# Patient Record
Sex: MALE | Race: WHITE | NOT HISPANIC OR LATINO | URBAN - METROPOLITAN AREA
[De-identification: names, ages, dates, MRNs, and addresses within clinical notes are randomized per-mention and may not be internally consistent; named-entity substitution may affect disease eponyms.]

---

## 2018-02-16 ENCOUNTER — INPATIENT (INPATIENT)
Facility: HOSPITAL | Age: 28
LOS: 4 days | Discharge: HOME | DRG: 378 | End: 2018-02-21
Attending: SURGERY | Admitting: SURGERY
Payer: COMMERCIAL

## 2018-02-16 VITALS
RESPIRATION RATE: 17 BRPM | TEMPERATURE: 99 F | HEART RATE: 97 BPM | SYSTOLIC BLOOD PRESSURE: 161 MMHG | DIASTOLIC BLOOD PRESSURE: 89 MMHG | OXYGEN SATURATION: 97 %

## 2018-02-16 DIAGNOSIS — K92.2 GASTROINTESTINAL HEMORRHAGE, UNSPECIFIED: ICD-10-CM

## 2018-02-16 DIAGNOSIS — Z98.890 OTHER SPECIFIED POSTPROCEDURAL STATES: Chronic | ICD-10-CM

## 2018-02-16 LAB
LACTATE SERPL-SCNC: 1.5 MMOL/L — SIGNIFICANT CHANGE UP (ref 0.5–2)
LACTATE SERPL-SCNC: 2.8 MMOL/L — HIGH (ref 0.5–2)
OB PNL STL: POSITIVE

## 2018-02-16 PROCEDURE — 99285 EMERGENCY DEPT VISIT HI MDM: CPT | Mod: 25

## 2018-02-16 PROCEDURE — 93010 ELECTROCARDIOGRAM REPORT: CPT | Mod: NC

## 2018-02-16 RX ORDER — ONDANSETRON 8 MG/1
4 TABLET, FILM COATED ORAL EVERY 6 HOURS
Qty: 0 | Refills: 0 | Status: DISCONTINUED | OUTPATIENT
Start: 2018-02-16 | End: 2018-02-21

## 2018-02-16 RX ORDER — ONDANSETRON 8 MG/1
4 TABLET, FILM COATED ORAL ONCE
Qty: 0 | Refills: 0 | Status: COMPLETED | OUTPATIENT
Start: 2018-02-16 | End: 2018-02-16

## 2018-02-16 RX ORDER — SODIUM CHLORIDE 9 MG/ML
1000 INJECTION, SOLUTION INTRAVENOUS
Qty: 0 | Refills: 0 | Status: DISCONTINUED | OUTPATIENT
Start: 2018-02-16 | End: 2018-02-18

## 2018-02-16 RX ORDER — SOD SULF/SODIUM/NAHCO3/KCL/PEG
4000 SOLUTION, RECONSTITUTED, ORAL ORAL ONCE
Qty: 0 | Refills: 0 | Status: COMPLETED | OUTPATIENT
Start: 2018-02-16 | End: 2018-02-16

## 2018-02-16 RX ORDER — PANTOPRAZOLE SODIUM 20 MG/1
80 TABLET, DELAYED RELEASE ORAL ONCE
Qty: 0 | Refills: 0 | Status: COMPLETED | OUTPATIENT
Start: 2018-02-16 | End: 2018-02-16

## 2018-02-16 RX ORDER — SODIUM CHLORIDE 9 MG/ML
2000 INJECTION INTRAMUSCULAR; INTRAVENOUS; SUBCUTANEOUS ONCE
Qty: 0 | Refills: 0 | Status: COMPLETED | OUTPATIENT
Start: 2018-02-16 | End: 2018-02-16

## 2018-02-16 RX ADMIN — PANTOPRAZOLE SODIUM 80 MILLIGRAM(S): 20 TABLET, DELAYED RELEASE ORAL at 21:10

## 2018-02-16 RX ADMIN — ONDANSETRON 4 MILLIGRAM(S): 8 TABLET, FILM COATED ORAL at 21:01

## 2018-02-16 RX ADMIN — SODIUM CHLORIDE 1000 MILLILITER(S): 9 INJECTION INTRAMUSCULAR; INTRAVENOUS; SUBCUTANEOUS at 20:40

## 2018-02-16 NOTE — H&P ADULT - NSHPPHYSICALEXAM_GEN_ALL_CORE
Vital Signs Last 24 Hrs  T(C): 37.1 (16 Feb 2018 20:05), Max: 37.1 (16 Feb 2018 20:05)  T(F): 98.8 (16 Feb 2018 20:05), Max: 98.8 (16 Feb 2018 20:05)  HR: 87 (16 Feb 2018 21:58) (87 - 97)  BP: 119/64 (16 Feb 2018 21:58) (119/64 - 161/89)  BP(mean): --  ABP: --  ABP(mean): --  RR: 18 (16 Feb 2018 21:58) (17 - 18)  SpO2: 98% (16 Feb 2018 21:58) (97% - 98%)    Physical:  Gen: NAD, resting comfortably  HEENT: NC/AT, EOMI, PERRLA  Neck: supple, trachea midline   Lungs: normal resp effort, CTA-B  Heart: RRR, no murmurs  Abd: soft, NT/ND, no organomegaly, +BS, no guarding/rigidity/rebound tenderness  Rectal: no external hemorrhoids, no anal tears/fissures, no active bleeding, dark blood clot in vault, normal rectal tone  Extr: 5/5 strength x 4, no clubbing/cyanosis/edema  Pulses: 2+ peripheral pulses  Lymphatics: no LAD  Neuro: A/O x 3, normal motor/sensation, CNs II-XII grossly intact, no deficits  skin: no rashes or lesions  Psych: normal

## 2018-02-16 NOTE — H&P ADULT - ATTENDING COMMENTS
Pt with GI bleed, blood per rectum. Started spontaneously. Denies any pain or other complaints.     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   In 2013 had episode of blood per rectum ([facility]), which resulted in resection of distal small bowel again (per pt).    on colonoscopy - ulceration of anastomosis, some blood, no active bleeding.  Biopcies taken.    Pt says, his father had Meckel's diverticula.    abdomen is soft, NT, ND.  no incisional hernias.   will request records from hospital in [place].   Serial H&H, consider Meckel's scan.

## 2018-02-16 NOTE — ED PROVIDER NOTE - OBJECTIVE STATEMENT
28 yom pw bloody stool.  states sx began this AM, noted blood mixed w/ stool, bright red, however, throughout the day, got worse, w/ more bleeding and black/darker in color.  hx of GI bleed in the past.  hx of intussusception as an infant sp surgical repair.  also had bowel resection in the 90s for GI bleeding but did not find any cause of bleeding.  has had further smaller bleeding since.  in usual state of health yesterday.  no nv, no cp, no sob, no weakness.  last BM was 7pm.

## 2018-02-16 NOTE — H&P ADULT - PROBLEM SELECTOR PLAN 1
- NPO  - IVF,  cc/hr  - GI consulted (service, Dr. Gallego, spoke with fellow Dr. Haas) for colonoscopy in AM  - will prep tonight with GoLytely  - repeat Hb and T/S at [facility]  - AM labs: CBC, BMP, Mag, Phos  - monitor UOP  - serial abd exams  - SCDs, no SQH for now  - incentive spirometer - NPO  - IVF,  cc/hr  - GI consulted (service, Dr. Gallego, spoke with fellow Dr. Haas) for colonoscopy in AM  - will prep tonight with GoLytely  - repeat Hb and T/S at [facility]  - AM labs: CBC, BMP, Mag, Phos  - monitor UOP  - serial abd exams  - monitor for further hematochezia  - SCDs, no SQH for now  - incentive spirometer

## 2018-02-16 NOTE — ED ADULT TRIAGE NOTE - ARRIVAL INFO ADDITIONAL COMMENTS
Pt walked into ED with c/o of passing liquid dark bloody stool. Onset was this morning and states that it is worsening. Pt states he also feels nasuea and light headed. Pt denies fever, chills, vomiting, diarrhea, passing blood clots, and trauma to the anal area. Pt has hx of rectal bleeding with hx of blood transfusion in ICU leading to bowel resection. Pt denies taking blood thinners. Denies taking daily medication.

## 2018-02-16 NOTE — ED PROVIDER NOTE - PHYSICAL EXAMINATION
CON: ao x 3, HENMT: clear oropharynx, soft neck, HEAD: atraumatic, CV: rrr, equal pulses b/l, RESP: cta b/l, GI: +BS, soft, nontender, no rebound, no guarding, rectal exam w/ dark stool, no hemorrhage or bleeding, SKIN: no rash, MSK: no deformities, NEURO: no gross motor or sensory deficit

## 2018-02-16 NOTE — H&P ADULT - NSHPLABSRESULTS_GEN_ALL_CORE
15.2   8.4   )-----------( 213      ( 16 Feb 2018 20:43 )             44.0     Lactate, Blood: 2.8 mmoL/L (02.16.18 @ 20:42)    02-16    138  |  98  |  15  ----------------------------<  108<H>  3.9   |  27  |  1.02    Ca    9.5      16 Feb 2018 20:43    TPro  7.0  /  Alb  4.1  /  TBili  0.4  /  DBili  x   /  AST  27  /  ALT  44  /  AlkPhos  57  02-16

## 2018-02-16 NOTE — ED ADULT NURSE NOTE - OBJECTIVE STATEMENT
29 y/o male with hx of GI bleeding arrived to [facility] ER reporting gala rectal bleeding accompanied with weakness and dizziness starting this morning. Pt verbalized that it started this morning with blood mixed with stool that progressed to liquid blood emptying out his rectum. Upon assessment, abdomen soft, lung fields WNL, breathing is equal and unlabored, pulses palpable, no visible injuries. Pt verbalized that this issue occurred 2013 and he was admitted to SICU in [place]. Pt denies chest pain, headache, blurred vision, slurred speech, vomiting, diarrhea, fever, chills, LOC, SOB, and palpitations. Care in progress.

## 2018-02-16 NOTE — ED PROVIDER NOTE - MEDICAL DECISION MAKING DETAILS
rectal bleeding, initially bright red blood w/ stool, then increased in volume and became dark, hx of GI bleeding w/ resection, will check labs, lactate, type screen, orthostatic, CT angio

## 2018-02-16 NOTE — H&P ADULT - ASSESSMENT
29 yo M with lower GI bleed. Hemodynamically stable, Hb stable.   Possibly small bowel in origin, but need to r/o more common colorectal source first.   Lactate cleared (2.8 to 1.5).

## 2018-02-16 NOTE — H&P ADULT - HISTORY OF PRESENT ILLNESS
Pt is a 27 yo M with IBS-diarrheal type (takes imodium prn) who presents with 8 hour history of dark blood per rectum. His first BM today was "pure dark blood" without stool in late morning. Around 4 pm, he had another episode of hematochezia, which prompted him to come to [facility] ED. Upon arrival to ED, he had a 3rd episode of dark blood per rectum. No raw foods, no sick contacts recently, no rectal/anal trauma, no straining, no constipation. No abd pain, no nausea, no hematemesis. He has eaten/drank very little today.    When he was an infant, he had a surgery on his small bowel. In 2013, he was hospitalized in SICU at [facility] ([place], [place]) for lower GI bleed. Workup at that time consisted of an EGD and colonoscopy, which were both negative, and a capsule endoscopy, which results are unknown. He again underwent resection of small bowel, at which time a mass was identified, but path unknown. He is otherwise healthy. Father had Meckel's diverticulitis, and mother had gastric cancer. Pt is a 27 yo M with IBS-diarrheal type (takes imodium prn) who presents with 8 hour history of dark blood per rectum. His first BM today was "pure dark blood" without stool in late morning. Around 4 pm, he had another episode of hematochezia, which prompted him to come to [facility] ED. Upon arrival to ED, he had a 3rd episode of dark blood per rectum. No raw foods, no sick contacts recently, no rectal/anal trauma, no straining, no constipation. No abd pain, no nausea, no hematemesis. He has eaten/drank very little today.    When he was an infant, he had a surgery on his small bowel. In 2013, he was hospitalized in SICU at [facility] ([place], [place]) for severe lower GI bleed, at which time he also seized. Workup at that time consisted of an EGD and colonoscopy, which were both negative, and a capsule endoscopy, which results are unknown. He again underwent resection of small bowel, at which time a mass was identified, but path unknown. He is otherwise healthy. Father had Meckel's diverticulitis, and mother had gastric cancer.

## 2018-02-16 NOTE — H&P ADULT - FAMILY HISTORY
Father  Still living? Unknown  Family history of Meckel's diverticulum, Age at diagnosis: Age Unknown     Mother  Still living? Unknown  Family history of gastric cancer, Age at diagnosis: Age Unknown

## 2018-02-17 ENCOUNTER — RESULT REVIEW (OUTPATIENT)
Age: 28
End: 2018-02-17

## 2018-02-17 LAB
ANION GAP SERPL CALC-SCNC: 9 MMOL/L — SIGNIFICANT CHANGE UP (ref 5–17)
BLD GP AB SCN SERPL QL: NEGATIVE — SIGNIFICANT CHANGE UP
BUN SERPL-MCNC: 11 MG/DL — SIGNIFICANT CHANGE UP (ref 7–23)
CALCIUM SERPL-MCNC: 8.2 MG/DL — LOW (ref 8.4–10.5)
CHLORIDE SERPL-SCNC: 102 MMOL/L — SIGNIFICANT CHANGE UP (ref 96–108)
CO2 SERPL-SCNC: 28 MMOL/L — SIGNIFICANT CHANGE UP (ref 22–31)
CREAT SERPL-MCNC: 0.98 MG/DL — SIGNIFICANT CHANGE UP (ref 0.5–1.3)
GLUCOSE SERPL-MCNC: 106 MG/DL — HIGH (ref 70–99)
HCT VFR BLD CALC: 28.2 % — LOW (ref 39–50)
HCT VFR BLD CALC: 30.6 % — LOW (ref 39–50)
HCT VFR BLD CALC: 35.1 % — LOW (ref 39–50)
HGB BLD-MCNC: 10.6 G/DL — LOW (ref 13–17)
HGB BLD-MCNC: 11.9 G/DL — LOW (ref 13–17)
HGB BLD-MCNC: 9.5 G/DL — LOW (ref 13–17)
MAGNESIUM SERPL-MCNC: 1.6 MG/DL — SIGNIFICANT CHANGE UP (ref 1.6–2.6)
MCHC RBC-ENTMCNC: 29.1 PG — SIGNIFICANT CHANGE UP (ref 27–34)
MCHC RBC-ENTMCNC: 29.2 PG — SIGNIFICANT CHANGE UP (ref 27–34)
MCHC RBC-ENTMCNC: 29.9 PG — SIGNIFICANT CHANGE UP (ref 27–34)
MCHC RBC-ENTMCNC: 33.7 G/DL — SIGNIFICANT CHANGE UP (ref 32–36)
MCHC RBC-ENTMCNC: 33.9 G/DL — SIGNIFICANT CHANGE UP (ref 32–36)
MCHC RBC-ENTMCNC: 34.6 G/DL — SIGNIFICANT CHANGE UP (ref 32–36)
MCV RBC AUTO: 85.8 FL — SIGNIFICANT CHANGE UP (ref 80–100)
MCV RBC AUTO: 86.4 FL — SIGNIFICANT CHANGE UP (ref 80–100)
MCV RBC AUTO: 86.8 FL — SIGNIFICANT CHANGE UP (ref 80–100)
PHOSPHATE SERPL-MCNC: 2.7 MG/DL — SIGNIFICANT CHANGE UP (ref 2.5–4.5)
PLATELET # BLD AUTO: 168 K/UL — SIGNIFICANT CHANGE UP (ref 150–400)
PLATELET # BLD AUTO: 169 K/UL — SIGNIFICANT CHANGE UP (ref 150–400)
PLATELET # BLD AUTO: 175 K/UL — SIGNIFICANT CHANGE UP (ref 150–400)
POTASSIUM SERPL-MCNC: 3.8 MMOL/L — SIGNIFICANT CHANGE UP (ref 3.5–5.3)
POTASSIUM SERPL-SCNC: 3.8 MMOL/L — SIGNIFICANT CHANGE UP (ref 3.5–5.3)
RBC # BLD: 3.25 M/UL — LOW (ref 4.2–5.8)
RBC # BLD: 3.54 M/UL — LOW (ref 4.2–5.8)
RBC # BLD: 4.09 M/UL — LOW (ref 4.2–5.8)
RBC # FLD: 12.3 % — SIGNIFICANT CHANGE UP (ref 10.3–16.9)
RBC # FLD: 12.7 % — SIGNIFICANT CHANGE UP (ref 10.3–16.9)
RBC # FLD: 12.8 % — SIGNIFICANT CHANGE UP (ref 10.3–16.9)
RH IG SCN BLD-IMP: POSITIVE — SIGNIFICANT CHANGE UP
SODIUM SERPL-SCNC: 139 MMOL/L — SIGNIFICANT CHANGE UP (ref 135–145)
WBC # BLD: 10.3 K/UL — SIGNIFICANT CHANGE UP (ref 3.8–10.5)
WBC # BLD: 7 K/UL — SIGNIFICANT CHANGE UP (ref 3.8–10.5)
WBC # BLD: 7.7 K/UL — SIGNIFICANT CHANGE UP (ref 3.8–10.5)
WBC # FLD AUTO: 10.3 K/UL — SIGNIFICANT CHANGE UP (ref 3.8–10.5)
WBC # FLD AUTO: 7 K/UL — SIGNIFICANT CHANGE UP (ref 3.8–10.5)
WBC # FLD AUTO: 7.7 K/UL — SIGNIFICANT CHANGE UP (ref 3.8–10.5)

## 2018-02-17 RX ORDER — MAGNESIUM SULFATE 500 MG/ML
2 VIAL (ML) INJECTION ONCE
Qty: 0 | Refills: 0 | Status: COMPLETED | OUTPATIENT
Start: 2018-02-17 | End: 2018-02-17

## 2018-02-17 RX ORDER — POTASSIUM CHLORIDE 20 MEQ
10 PACKET (EA) ORAL ONCE
Qty: 0 | Refills: 0 | Status: COMPLETED | OUTPATIENT
Start: 2018-02-17 | End: 2018-02-17

## 2018-02-17 RX ADMIN — Medication 100 MILLIEQUIVALENT(S): at 13:39

## 2018-02-17 RX ADMIN — SODIUM CHLORIDE 110 MILLILITER(S): 9 INJECTION, SOLUTION INTRAVENOUS at 07:13

## 2018-02-17 RX ADMIN — Medication 4000 MILLILITER(S): at 00:01

## 2018-02-17 RX ADMIN — Medication 50 GRAM(S): at 12:03

## 2018-02-17 RX ADMIN — SODIUM CHLORIDE 110 MILLILITER(S): 9 INJECTION, SOLUTION INTRAVENOUS at 00:01

## 2018-02-17 RX ADMIN — SODIUM CHLORIDE 110 MILLILITER(S): 9 INJECTION, SOLUTION INTRAVENOUS at 21:34

## 2018-02-17 NOTE — CONSULT NOTE ADULT - SUBJECTIVE AND OBJECTIVE BOX
Pt is a 27 yo M with IBS-diarrheal type (takes imodium prn) who presents with 8 hour history of dark blood per rectum. His first BM today was "pure dark blood" without stool in late morning. Around 4 pm, he had another episode of hematochezia, which prompted him to come to [facility] ED. Upon arrival to ED, he had a 3rd episode of dark blood per rectum. No raw foods, no sick contacts recently, no rectal/anal trauma, no straining, no constipation. No abd pain, no nausea, no hematemesis. He has eaten/drank very little today.    When he was an infant, he had a surgery on his small bowel. In 2013, he was hospitalized in SICU at [facility] ([place], [place]) for severe lower GI bleed, at which time he also seized. Workup at that time consisted of an EGD and colonoscopy, which were both negative, and a capsule endoscopy, which results are unknown. He again underwent resection of small bowel, at which time a mass was identified, but path unknown. He is otherwise healthy. Father had Meckel's diverticulitis, and mother had gastric cancer. (16 Feb 2018 22:46)      REVIEW OF SYSTEMS:  Constitutional: No fever, weight loss or fatigue  ENMT:  No difficulty hearing, tinnitus, vertigo; No sinus or throat pain  Respiratory: No cough, wheezing, chills or hemoptysis  Cardiovascular: No chest pain, palpitations, dizziness or leg swelling  Gastrointestinal: No abdominal or epigastric pain. No nausea, vomiting or hematemesis; No diarrhea or constipation. No melena or hematochezia.  Skin: No itching, burning, rashes or lesions   Musculoskeletal: No joint pain or swelling; No muscle, back or extremity pain    PAST MEDICAL & SURGICAL HISTORY:  Irritable bowel syndrome with diarrhea  GI bleeding  History of bowel resection: SMALL BOWEL      FAMILY HISTORY:  Family history of gastric cancer (Mother)  Family history of Meckel's diverticulum (Father)      SOCIAL HISTORY:  Smoking Status: [ ] Current, [ ] Former, [ ] Never  Pack Years:    MEDICATIONS:  MEDICATIONS  (STANDING):  lactated ringers. 1000 milliLiter(s) (110 mL/Hr) IV Continuous <Continuous>    MEDICATIONS  (PRN):  ondansetron Injectable 4 milliGRAM(s) IV Push every 6 hours PRN Nausea      Allergies    No Known Allergies    Intolerances        Vital Signs Last 24 Hrs  T(C): 37.1 (17 Feb 2018 16:50), Max: 37.1 (16 Feb 2018 20:05)  T(F): 98.8 (17 Feb 2018 16:50), Max: 98.8 (16 Feb 2018 20:05)  HR: 74 (17 Feb 2018 16:53) (65 - 97)  BP: 121/69 (17 Feb 2018 16:53) (101/80 - 161/89)  BP(mean): 89 (17 Feb 2018 11:45) (74 - 92)  RR: 17 (17 Feb 2018 16:53) (14 - 23)  SpO2: 99% (17 Feb 2018 16:53) (97% - 100%)    02-16 @ 07:01  -  02-17 @ 07:00  --------------------------------------------------------  IN: 880 mL / OUT: 3 mL / NET: 877 mL    02-17 @ 07:01  -  02-17 @ 16:57  --------------------------------------------------------  IN: 1000 mL / OUT: 600 mL / NET: 400 mL          PHYSICAL EXAM:    General: Well developed; well nourished; in no acute distress  HEENT: MMM, conjunctiva and sclera clear  Gastrointestinal: Soft, non-tender non-distended; Normal bowel sounds; No rebound or guarding  Extremities: Normal range of motion, No clubbing, cyanosis or edema  Neurological: Alert and oriented x3  Skin: Warm and dry. No obvious rash      LABS:                        11.9   7.0   )-----------( 169      ( 17 Feb 2018 06:25 )             35.1     02-17    139  |  102  |  11  ----------------------------<  106<H>  3.8   |  28  |  0.98    Ca    8.2<L>      17 Feb 2018 06:25  Phos  2.7     02-17  Mg     1.6     02-17    TPro  7.0  /  Alb  4.1  /  TBili  0.4  /  DBili  x   /  AST  27  /  ALT  44  /  AlkPhos  57  02-16        RADIOLOGY & ADDITIONAL STUDIES: Pt is a 27 yo M with IBS-D presenting with dark blood per rectum    Patient has a history of intussusception as a child s/p SB resection. THis was followed by GIB in 2013 resulting in bowel resection due to (complications of previous surgery). EGD/colonoscopy and VCE was negative at that time.  He does not know pathology results of that resection.  He had continuous dark blood per rectum with prep. No abdom pain no n/v   Father had Meckel's diverticulitis, and mother had gastric cancer.       REVIEW OF SYSTEMS:  Constitutional: No fever, weight loss or fatigue  ENMT:  No difficulty hearing, tinnitus, vertigo; No sinus or throat pain  Respiratory: No cough, wheezing, chills or hemoptysis  Cardiovascular: No chest pain, palpitations, dizziness or leg swelling  Gastrointestinal: No abdominal or epigastric pain. No nausea, vomiting or hematemesis; No diarrhea or constipation.   Skin: No itching, burning, rashes or lesions   Musculoskeletal: No joint pain or swelling; No muscle, back or extremity pain    PAST MEDICAL & SURGICAL HISTORY:  Irritable bowel syndrome with diarrhea  GI bleeding  History of bowel resection: SMALL BOWEL      FAMILY HISTORY:  Family history of gastric cancer (Mother)  Family history of Meckel's diverticulum (Father)      SOCIAL HISTORY:  Smoking Status: [ ] Current, [ ] Former, [ ] Never  Pack Years:    MEDICATIONS:  MEDICATIONS  (STANDING):  lactated ringers. 1000 milliLiter(s) (110 mL/Hr) IV Continuous <Continuous>    MEDICATIONS  (PRN):  ondansetron Injectable 4 milliGRAM(s) IV Push every 6 hours PRN Nausea      Allergies    No Known Allergies    Intolerances        Vital Signs Last 24 Hrs  T(C): 37.1 (17 Feb 2018 16:50), Max: 37.1 (16 Feb 2018 20:05)  T(F): 98.8 (17 Feb 2018 16:50), Max: 98.8 (16 Feb 2018 20:05)  HR: 74 (17 Feb 2018 16:53) (65 - 97)  BP: 121/69 (17 Feb 2018 16:53) (101/80 - 161/89)  BP(mean): 89 (17 Feb 2018 11:45) (74 - 92)  RR: 17 (17 Feb 2018 16:53) (14 - 23)  SpO2: 99% (17 Feb 2018 16:53) (97% - 100%)    02-16 @ 07:01  -  02-17 @ 07:00  --------------------------------------------------------  IN: 880 mL / OUT: 3 mL / NET: 877 mL    02-17 @ 07:01  -  02-17 @ 16:57  --------------------------------------------------------  IN: 1000 mL / OUT: 600 mL / NET: 400 mL          PHYSICAL EXAM:    General: Well developed; well nourished; in no acute distress  HEENT: MMM, conjunctiva and sclera clear  Gastrointestinal: Soft, non-tender non-distended; Normal bowel sounds; No rebound or guarding  rectal: no blood in stool  Extremities: Normal range of motion, No clubbing, cyanosis or edema  Neurological: Alert and oriented x3  Skin: Warm and dry. No obvious rash      LABS:                        11.9   7.0   )-----------( 169      ( 17 Feb 2018 06:25 )             35.1     02-17    139  |  102  |  11  ----------------------------<  106<H>  3.8   |  28  |  0.98    Ca    8.2<L>      17 Feb 2018 06:25  Phos  2.7     02-17  Mg     1.6     02-17    TPro  7.0  /  Alb  4.1  /  TBili  0.4  /  DBili  x   /  AST  27  /  ALT  44  /  AlkPhos  57  02-16        RADIOLOGY & ADDITIONAL STUDIES:

## 2018-02-17 NOTE — CONSULT NOTE ADULT - ASSESSMENT
Pt is a 29 yo M with IBS-D presenting with dark blood per rectum    #hematochezia - s/p EGD with push to D4, normal appearing mucosa. and Colonoscopy wiht old blood throughout colon, more fresh blood as cecum was approached. Cecum had appearance of ileo-cecal end to side anastamosis. Anastamosis  displayed multiple ulcers with clean base s/p biopsy, and one intermittently oozing ulcer at edge of anastamosis s/p hemoclip. neoterminal ileum did not show ulceration in the first 5cm  findings consistent with anastamotic ulceration from chronic ischemia vs potential undiagnosed crohns disease in setting of long history of diarrhea symptoms  -f/u biopsy results  -order CRP  -order fecal calprotectin  -check CBC, transfuse <7  -obtain surgical report and path from previous surgery  -if not crohns, may need surgical revision of anastomosis Pt is a 29 yo M with IBS-D presenting with dark blood per rectum    #hematochezia - s/p EGD with push to D4, normal appearing mucosa. and Colonoscopy wiht old blood throughout colon, more fresh blood as cecum was approached. Cecum had appearance of ileo-cecal end to side anastamosis. Anastamosis  displayed multiple ulcers with clean base s/p biopsy, and one intermittently oozing ulcer at edge of anastamosis s/p hemoclip. neoterminal ileum did not show ulceration in the first 5cm  findings consistent with anastamotic ulceration from chronic ischemia vs potential undiagnosed crohns disease in setting of long history of diarrhea symptoms  However potentially more likely explaining his previous diarrhea is bile acid diarrhea in the setting of what we now know is an ieal resection  -f/u biopsy results  -order CRP  -order fecal calprotectin  -check CBC, transfuse <7  -check b12  -obtain surgical report and path from previous surgery  -if not crohns, may need surgical revision of anastomosis  -if above is negative, consider starting cholestyramine for maintenance of diarrhea

## 2018-02-17 NOTE — PROGRESS NOTE ADULT - SUBJECTIVE AND OBJECTIVE BOX
ON: Admitted for blood [place]. 8p HGB 15.2. 12p HGB 12.4  Started bowel prep.         29 yo M with lower GI bleed. Hemodynamically stable, Hb stable. Possibly small bowel in origin, but need to r/o more common colorectal source first.     - IVF,  cc/hr  - GI consulted (service, Dr. Gallego, spoke with fellow Dr. Haas) for colonoscopy in AM  - F/U AM labs  - monitor UOP  - serial abd exams  - monitor for further hematochezia  - SCDs, no SQH for now  - incentive spirometer. ON: Admitted for blood [place]. 8p HGB 15.2. 12p HGB 12.4  Started bowel prep.          SUBJECTIVE: Patient seen and examined bedside by chief resident. Further episode of GI bleed this morning, with clots.        Vital Signs Last 24 Hrs  T(C): 36.5 (17 Feb 2018 05:19), Max: 37.1 (16 Feb 2018 20:05)  T(F): 97.7 (17 Feb 2018 05:19), Max: 98.8 (16 Feb 2018 20:05)  HR: 74 (17 Feb 2018 05:39) (74 - 97)  BP: 119/73 (17 Feb 2018 05:39) (110/57 - 161/89)  BP(mean): 90 (17 Feb 2018 05:39) (80 - 90)  RR: 18 (17 Feb 2018 05:39) (17 - 19)  SpO2: 98% (17 Feb 2018 05:39) (97% - 99%)  I&O's Detail    16 Feb 2018 07:01  -  17 Feb 2018 07:00  --------------------------------------------------------  IN:    lactated ringers.: 880 mL  Total IN: 880 mL    OUT:    Voided: 3 mL  Total OUT: 3 mL    Total NET: 877 mL          General: NAD, resting comfortably in bed  C/V: NSR  Pulm: Nonlabored breathing, no respiratory distress  Abd: soft, NT/ND.  Extrem: WWP, no edema, SCDs in place        LABS:                        11.9   7.0   )-----------( 169      ( 17 Feb 2018 06:25 )             35.1     02-17    139  |  102  |  11  ----------------------------<  106<H>  3.8   |  28  |  0.98    Ca    8.2<L>      17 Feb 2018 06:25  Phos  2.7     02-17  Mg     1.6     02-17    TPro  7.0  /  Alb  4.1  /  TBili  0.4  /  DBili  x   /  AST  27  /  ALT  44  /  AlkPhos  57  02-16    PT/INR - ( 16 Feb 2018 20:43 )   PT: 10.3 sec;   INR: 0.93          PTT - ( 16 Feb 2018 20:43 )  PTT:28.9 sec      RADIOLOGY & ADDITIONAL STUDIES:        27 yo M with lower GI bleed. Hemodynamically stable, Hb stable. Possibly small bowel in origin, but need to r/o more common colorectal source first.     - IVF,  cc/hr  - GI consulted (service, Dr. Gallego, spoke with fellow Dr. Haas) for colonoscopy in AM.  - F/U AM labs  - monitor UOP  - serial abd exams  - monitor for further hematochezia  - SCDs, no SQH for now  - incentive spirometer.

## 2018-02-18 LAB
ANION GAP SERPL CALC-SCNC: 11 MMOL/L — SIGNIFICANT CHANGE UP (ref 5–17)
BLD GP AB SCN SERPL QL: NEGATIVE — SIGNIFICANT CHANGE UP
BUN SERPL-MCNC: 12 MG/DL — SIGNIFICANT CHANGE UP (ref 7–23)
CALCIUM SERPL-MCNC: 8.2 MG/DL — LOW (ref 8.4–10.5)
CHLORIDE SERPL-SCNC: 105 MMOL/L — SIGNIFICANT CHANGE UP (ref 96–108)
CO2 SERPL-SCNC: 24 MMOL/L — SIGNIFICANT CHANGE UP (ref 22–31)
CREAT SERPL-MCNC: 0.94 MG/DL — SIGNIFICANT CHANGE UP (ref 0.5–1.3)
CRP SERPL-MCNC: 0.24 MG/DL — SIGNIFICANT CHANGE UP (ref 0–0.4)
GLUCOSE SERPL-MCNC: 80 MG/DL — SIGNIFICANT CHANGE UP (ref 70–99)
HCT VFR BLD CALC: 24.9 % — LOW (ref 39–50)
HCT VFR BLD CALC: 25.7 % — LOW (ref 39–50)
HGB BLD-MCNC: 8.4 G/DL — LOW (ref 13–17)
HGB BLD-MCNC: 8.6 G/DL — LOW (ref 13–17)
MAGNESIUM SERPL-MCNC: 1.8 MG/DL — SIGNIFICANT CHANGE UP (ref 1.6–2.6)
MCHC RBC-ENTMCNC: 29.3 PG — SIGNIFICANT CHANGE UP (ref 27–34)
MCHC RBC-ENTMCNC: 29.5 PG — SIGNIFICANT CHANGE UP (ref 27–34)
MCHC RBC-ENTMCNC: 33.5 G/DL — SIGNIFICANT CHANGE UP (ref 32–36)
MCHC RBC-ENTMCNC: 33.7 G/DL — SIGNIFICANT CHANGE UP (ref 32–36)
MCV RBC AUTO: 86.8 FL — SIGNIFICANT CHANGE UP (ref 80–100)
MCV RBC AUTO: 88 FL — SIGNIFICANT CHANGE UP (ref 80–100)
PHOSPHATE SERPL-MCNC: 3.3 MG/DL — SIGNIFICANT CHANGE UP (ref 2.5–4.5)
PLATELET # BLD AUTO: 162 K/UL — SIGNIFICANT CHANGE UP (ref 150–400)
PLATELET # BLD AUTO: 163 K/UL — SIGNIFICANT CHANGE UP (ref 150–400)
POTASSIUM SERPL-MCNC: 4.2 MMOL/L — SIGNIFICANT CHANGE UP (ref 3.5–5.3)
POTASSIUM SERPL-SCNC: 4.2 MMOL/L — SIGNIFICANT CHANGE UP (ref 3.5–5.3)
RBC # BLD: 2.87 M/UL — LOW (ref 4.2–5.8)
RBC # BLD: 2.92 M/UL — LOW (ref 4.2–5.8)
RBC # FLD: 12 % — SIGNIFICANT CHANGE UP (ref 10.3–16.9)
RBC # FLD: 12.2 % — SIGNIFICANT CHANGE UP (ref 10.3–16.9)
RH IG SCN BLD-IMP: POSITIVE — SIGNIFICANT CHANGE UP
SODIUM SERPL-SCNC: 140 MMOL/L — SIGNIFICANT CHANGE UP (ref 135–145)
WBC # BLD: 5.4 K/UL — SIGNIFICANT CHANGE UP (ref 3.8–10.5)
WBC # BLD: 6.8 K/UL — SIGNIFICANT CHANGE UP (ref 3.8–10.5)
WBC # FLD AUTO: 5.4 K/UL — SIGNIFICANT CHANGE UP (ref 3.8–10.5)
WBC # FLD AUTO: 6.8 K/UL — SIGNIFICANT CHANGE UP (ref 3.8–10.5)

## 2018-02-18 PROCEDURE — 78290 INTESTINE IMAGING: CPT | Mod: 26

## 2018-02-18 RX ORDER — MAGNESIUM SULFATE 500 MG/ML
1 VIAL (ML) INJECTION ONCE
Qty: 0 | Refills: 0 | Status: COMPLETED | OUTPATIENT
Start: 2018-02-18 | End: 2018-02-18

## 2018-02-18 RX ORDER — CHOLESTYRAMINE 4 G/9G
4 POWDER, FOR SUSPENSION ORAL ONCE
Qty: 0 | Refills: 0 | Status: COMPLETED | OUTPATIENT
Start: 2018-02-18 | End: 2018-02-18

## 2018-02-18 RX ORDER — CHOLESTYRAMINE 4 G/9G
4 POWDER, FOR SUSPENSION ORAL DAILY
Qty: 0 | Refills: 0 | Status: DISCONTINUED | OUTPATIENT
Start: 2018-02-18 | End: 2018-02-18

## 2018-02-18 RX ADMIN — Medication 100 GRAM(S): at 11:18

## 2018-02-18 RX ADMIN — CHOLESTYRAMINE 4 GRAM(S): 4 POWDER, FOR SUSPENSION ORAL at 17:09

## 2018-02-18 RX ADMIN — SODIUM CHLORIDE 110 MILLILITER(S): 9 INJECTION, SOLUTION INTRAVENOUS at 05:58

## 2018-02-18 NOTE — PROGRESS NOTE ADULT - ASSESSMENT
Pt is a 27 yo M with IBS-D presenting with dark blood per rectum    #hematochezia - s/p EGD with push to D4, normal appearing mucosa. and Colonoscopy wiht old blood throughout colon, more fresh blood as cecum was approached. Cecum had appearance of ileo-cecal end to side anastamosis. Anastamosis  displayed multiple ulcers with clean base s/p biopsy, and one intermittently oozing ulcer at edge of anastamosis s/p hemoclip. neoterminal ileum did not show ulceration in the first 5cm  findings consistent with anastamotic ulceration from chronic ischemia vs potential undiagnosed crohns disease in setting of long history of diarrhea symptoms  However potentially more likely explaining his previous diarrhea is bile acid diarrhea in the setting of what we now know is an ieal resection  Hb dropped some more, but no further hematochezia since yesterday; bleeding seems to have stopped. May need revision if continues to bleed, will defer to surgeon opinion  -f/u biopsy results  -f/uCRP  -f/u fecal calprotectin  -monitor CBC, transfuse <7  -f/ub12  -obtain surgical report and path from previous surgery  -if not crohns, may need surgical revision of anastomosis  -start cholestyramine for possible bile acid diarrhea, also to improve ulcer healing

## 2018-02-18 NOTE — PROGRESS NOTE ADULT - SUBJECTIVE AND OBJECTIVE BOX
SUBJECTIVE: Patient seen and examined bedside by chief resident. 2/17: AM hgb 11.9. Bloody BM's in evening x3 -- mixed dark and bright red blood. Repeat hgb at 6 pm 10.6 10 pm 9.5 Colonoscopy showed ulcerations at previous site of ileocolic anastomosis. No active bleed found. Ulceration with "possible" active bleed was clipped. Meckel's scan pending.        Vital Signs Last 24 Hrs  T(C): 36.8 (18 Feb 2018 05:11), Max: 37.1 (17 Feb 2018 16:50)  T(F): 98.3 (18 Feb 2018 05:11), Max: 98.8 (17 Feb 2018 16:50)  HR: 82 (18 Feb 2018 06:03) (65 - 94)  BP: 114/60 (18 Feb 2018 06:03) (101/80 - 130/67)  BP(mean): 81 (18 Feb 2018 06:03) (74 - 92)  RR: 17 (18 Feb 2018 06:03) (14 - 23)  SpO2: 97% (18 Feb 2018 06:03) (96% - 100%)  I&O's Detail    17 Feb 2018 07:01  -  18 Feb 2018 07:00  --------------------------------------------------------  IN:    lactated ringers.: 1210 mL    Other: 1000 mL  Total IN: 2210 mL    OUT:    Voided: 600 mL  Total OUT: 600 mL    Total NET: 1610 mL          General: NAD, resting comfortably in bed  Pulm: Nonlabored breathing, no respiratory distress  Abd: soft, NT/ND.    LABS:                        8.6    6.8   )-----------( 162      ( 18 Feb 2018 06:11 )             25.7     02-18    140  |  105  |  12  ----------------------------<  80  4.2   |  24  |  0.94    Ca    8.2<L>      18 Feb 2018 06:11  Phos  3.3     02-18  Mg     1.8     02-18    TPro  7.0  /  Alb  4.1  /  TBili  0.4  /  DBili  x   /  AST  27  /  ALT  44  /  AlkPhos  57  02-16    PT/INR - ( 16 Feb 2018 20:43 )   PT: 10.3 sec;   INR: 0.93          PTT - ( 16 Feb 2018 20:43 )  PTT:28.9 sec      RADIOLOGY & ADDITIONAL STUDIES:

## 2018-02-18 NOTE — PROGRESS NOTE ADULT - SUBJECTIVE AND OBJECTIVE BOX
h&h drop several units.  Hemodynamically ok.  no active bleeding per rectum since yesterday.  abdomen is soft, NT, ND.  Pt is hungry.  Afebrile.  For Meckel's scan today.  Pending records from [facility].    If scan is negative and H&H stabke, ok to feed.  pt is aware.

## 2018-02-18 NOTE — PROGRESS NOTE ADULT - ASSESSMENT
29 yo M with lower GI bleed. Hemodynamically stable, Hb stable. Possibly small bowel in origin, but need to r/o more common colorectal source first.     - IVF,  cc/hr  - GI consulted (service, Dr. Gallego, spoke with fellow Dr. Haas) for colonoscopy in AM  - F/U AM labs  - monitor UOP  - serial abd exams  - monitor for further hematochezia  - SCDs, no SQH for now  - incentive spirometer.

## 2018-02-18 NOTE — PROGRESS NOTE ADULT - SUBJECTIVE AND OBJECTIVE BOX
Pt seen and examined   patient tired this morning, sustained fall due to weakness vs orthostasis overnight, but now feeling well.   bleeding has subsided, patient has no pain or n/v    REVIEW OF SYSTEMS:  Constitutional: No fever, weight loss or fatigue  Cardiovascular: No chest pain, palpitations, dizziness or leg swelling  Gastrointestinal: No abdominal or epigastric pain. No nausea, vomiting or hematemesis;   Skin: No itching, burning, rashes or lesions       MEDICATIONS:  MEDICATIONS  (STANDING):  lactated ringers. 1000 milliLiter(s) (110 mL/Hr) IV Continuous <Continuous>    MEDICATIONS  (PRN):  ondansetron Injectable 4 milliGRAM(s) IV Push every 6 hours PRN Nausea      Allergies    No Known Allergies    Intolerances        Vital Signs Last 24 Hrs  T(C): 37.2 (18 Feb 2018 09:57), Max: 37.2 (18 Feb 2018 09:57)  T(F): 98.9 (18 Feb 2018 09:57), Max: 98.9 (18 Feb 2018 09:57)  HR: 78 (18 Feb 2018 08:32) (74 - 94)  BP: 113/57 (18 Feb 2018 08:32) (113/57 - 130/67)  BP(mean): 76 (18 Feb 2018 08:32) (76 - 88)  RR: 16 (18 Feb 2018 08:32) (16 - 19)  SpO2: 98% (18 Feb 2018 08:32) (96% - 99%)    02-17 @ 07:01  -  02-18 @ 07:00  --------------------------------------------------------  IN: 2210 mL / OUT: 600 mL / NET: 1610 mL        PHYSICAL EXAM:    General: pale appearing  HEENT: MMM, conjunctiva and sclera clear  Gastrointestinal: Soft non-tender non-distended; Normal bowel sounds; No hepatosplenomegaly. No rebound or guarding  Skin: Warm and dry. No obvious rash    LABS:  CBC Full  -  ( 18 Feb 2018 06:11 )  WBC Count : 6.8 K/uL  Hemoglobin : 8.6 g/dL  Hematocrit : 25.7 %  Platelet Count - Automated : 162 K/uL  Mean Cell Volume : 88.0 fL  Mean Cell Hemoglobin : 29.5 pg  Mean Cell Hemoglobin Concentration : 33.5 g/dL  Auto Neutrophil # : x  Auto Lymphocyte # : x  Auto Monocyte # : x  Auto Eosinophil # : x  Auto Basophil # : x  Auto Neutrophil % : x  Auto Lymphocyte % : x  Auto Monocyte % : x  Auto Eosinophil % : x  Auto Basophil % : x    02-18    140  |  105  |  12  ----------------------------<  80  4.2   |  24  |  0.94    Ca    8.2<L>      18 Feb 2018 06:11  Phos  3.3     02-18  Mg     1.8     02-18    TPro  7.0  /  Alb  4.1  /  TBili  0.4  /  DBili  x   /  AST  27  /  ALT  44  /  AlkPhos  57  02-16    PT/INR - ( 16 Feb 2018 20:43 )   PT: 10.3 sec;   INR: 0.93          PTT - ( 16 Feb 2018 20:43 )  PTT:28.9 sec                RADIOLOGY & ADDITIONAL STUDIES:

## 2018-02-18 NOTE — PROVIDER CONTACT NOTE (FALL NOTIFICATION) - SITUATION
PT FALL IN THE BATHROOM AT THIS TIME (PT REFUSED TO USED THE URINAL AND HE REQUEST TO GO TO THE BATHROOM  AND TO STAY BY HIMSELF AFTER 2 MINUTES PT FOUND IN THE FLOOR.) PT FALL IN THE BATHROOM AT THIS TIME (PT REFUSED TO USED THE URINAL AND HE REQUESTED TO GO TO THE BATHROOM  AND TO STAY BY HIMSELF AFTER 2 MINUTES PT FOUND IN THE FLOOR .) PT FALL IN THE BATHROOM (PT REFUSED TO USED THE URINAL AND HE REQUESTED TO GO TO THE BATHROOM  AND TO STAY BY HIMSELF , RN WITTING OUT SIDE THE BATHROOM, AFTER 2 MINUTES PT FOUND IN THE FLOOR .)

## 2018-02-19 LAB
ANION GAP SERPL CALC-SCNC: 8 MMOL/L — SIGNIFICANT CHANGE UP (ref 5–17)
BUN SERPL-MCNC: 10 MG/DL — SIGNIFICANT CHANGE UP (ref 7–23)
CALCIUM SERPL-MCNC: 8.4 MG/DL — SIGNIFICANT CHANGE UP (ref 8.4–10.5)
CHLORIDE SERPL-SCNC: 105 MMOL/L — SIGNIFICANT CHANGE UP (ref 96–108)
CO2 SERPL-SCNC: 28 MMOL/L — SIGNIFICANT CHANGE UP (ref 22–31)
CREAT SERPL-MCNC: 1.07 MG/DL — SIGNIFICANT CHANGE UP (ref 0.5–1.3)
GLUCOSE SERPL-MCNC: 97 MG/DL — SIGNIFICANT CHANGE UP (ref 70–99)
HCT VFR BLD CALC: 22.7 % — LOW (ref 39–50)
HGB BLD-MCNC: 7.9 G/DL — LOW (ref 13–17)
MAGNESIUM SERPL-MCNC: 1.7 MG/DL — SIGNIFICANT CHANGE UP (ref 1.6–2.6)
MCHC RBC-ENTMCNC: 29.6 PG — SIGNIFICANT CHANGE UP (ref 27–34)
MCHC RBC-ENTMCNC: 34.8 G/DL — SIGNIFICANT CHANGE UP (ref 32–36)
MCV RBC AUTO: 85 FL — SIGNIFICANT CHANGE UP (ref 80–100)
PHOSPHATE SERPL-MCNC: 3.4 MG/DL — SIGNIFICANT CHANGE UP (ref 2.5–4.5)
PLATELET # BLD AUTO: 153 K/UL — SIGNIFICANT CHANGE UP (ref 150–400)
POTASSIUM SERPL-MCNC: 4 MMOL/L — SIGNIFICANT CHANGE UP (ref 3.5–5.3)
POTASSIUM SERPL-SCNC: 4 MMOL/L — SIGNIFICANT CHANGE UP (ref 3.5–5.3)
RBC # BLD: 2.67 M/UL — LOW (ref 4.2–5.8)
RBC # FLD: 12.5 % — SIGNIFICANT CHANGE UP (ref 10.3–16.9)
SODIUM SERPL-SCNC: 141 MMOL/L — SIGNIFICANT CHANGE UP (ref 135–145)
VIT B12 SERPL-MCNC: <150 PG/ML — LOW (ref 232–1245)
WBC # BLD: 6 K/UL — SIGNIFICANT CHANGE UP (ref 3.8–10.5)
WBC # FLD AUTO: 6 K/UL — SIGNIFICANT CHANGE UP (ref 3.8–10.5)

## 2018-02-19 PROCEDURE — 93010 ELECTROCARDIOGRAM REPORT: CPT

## 2018-02-19 RX ORDER — MAGNESIUM SULFATE 500 MG/ML
2 VIAL (ML) INJECTION ONCE
Qty: 0 | Refills: 0 | Status: COMPLETED | OUTPATIENT
Start: 2018-02-19 | End: 2018-02-19

## 2018-02-19 RX ORDER — CHOLESTYRAMINE 4 G/9G
4 POWDER, FOR SUSPENSION ORAL DAILY
Qty: 0 | Refills: 0 | Status: DISCONTINUED | OUTPATIENT
Start: 2018-02-19 | End: 2018-02-21

## 2018-02-19 RX ADMIN — Medication 50 GRAM(S): at 09:25

## 2018-02-19 RX ADMIN — CHOLESTYRAMINE 4 GRAM(S): 4 POWDER, FOR SUSPENSION ORAL at 17:19

## 2018-02-19 NOTE — PROGRESS NOTE ADULT - ASSESSMENT
27 yo M with lower GI bleed. Hemodynamically stable, Hb stable. Possibly small bowel in origin, but need to r/o more common colorectal source first.     - Nausea control PRN  - Regular diet  - F/U AM labs  - monitor UOP  - serial abd exams  - monitor for further hematochezia  - SCDs, no SQH for now  - incentive spirometer.

## 2018-02-19 NOTE — PROGRESS NOTE ADULT - SUBJECTIVE AND OBJECTIVE BOX
ON: Last BM early in the day on Sunday, still contained large dark blood. No BM ON. HGB stable at 8.4.  2/18: Meckel scan pending read, regular diet, CRP/fecal calprotectin/vit b12 ordered per GI, cholestyramine ordered          29 yo M with lower GI bleed. Hemodynamically stable, Hb stable. Possibly small bowel in origin, but need to r/o more common colorectal source first.     - Nausea control PRN  - Regular diet  - F/U AM labs  - monitor UOP  - serial abd exams  - monitor for further hematochezia  - SCDs, no SQH for now  - incentive spirometer. SUBJECTIVE: Patient seen and examined bedside by chief resident. ON: Last BM early in the day on Sunday, still contained large dark blood. No BM ON. HGB stable at 8.4.        Vital Signs Last 24 Hrs  T(C): 37.1 (19 Feb 2018 04:33), Max: 37.2 (18 Feb 2018 09:57)  T(F): 98.8 (19 Feb 2018 04:33), Max: 99 (18 Feb 2018 18:00)  HR: 80 (19 Feb 2018 06:28) (78 - 90)  BP: 107/51 (19 Feb 2018 06:28) (102/54 - 122/66)  BP(mean): 74 (19 Feb 2018 06:28) (74 - 84)  RR: 16 (19 Feb 2018 06:28) (16 - 16)  SpO2: 96% (19 Feb 2018 06:28) (96% - 98%)  I&O's Detail    18 Feb 2018 07:01  -  19 Feb 2018 07:00  --------------------------------------------------------  IN:    lactated ringers.: 550 mL  Total IN: 550 mL    OUT:    Voided: 900 mL  Total OUT: 900 mL    Total NET: -350 mL          General: NAD, resting comfortably in bed  Pulm: Nonlabored breathing, no respiratory distress  Abd: soft, NT/ND.      LABS:                        7.9    6.0   )-----------( 153      ( 19 Feb 2018 06:41 )             22.7     02-18    140  |  105  |  12  ----------------------------<  80  4.2   |  24  |  0.94    Ca    8.2<L>      18 Feb 2018 06:11  Phos  3.3     02-18  Mg     1.8     02-18            RADIOLOGY & ADDITIONAL STUDIES:

## 2018-02-19 NOTE — PROGRESS NOTE ADULT - SUBJECTIVE AND OBJECTIVE BOX
Pt seen and examined   feeling well, no further bleeding      REVIEW OF SYSTEMS:  Constitutional: No fever, weight loss or fatigue  Cardiovascular: No chest pain, palpitations, dizziness or leg swelling  Gastrointestinal: No abdominal or epigastric pain. No nausea, vomiting or hematemesis; No diarrhea or constipation. No melena or hematochezia.  Skin: No itching, burning, rashes or lesions       MEDICATIONS:  MEDICATIONS  (STANDING):    MEDICATIONS  (PRN):  ondansetron Injectable 4 milliGRAM(s) IV Push every 6 hours PRN Nausea      Allergies    No Known Allergies    Intolerances        Vital Signs Last 24 Hrs  T(C): 37.2 (19 Feb 2018 14:00), Max: 37.2 (18 Feb 2018 18:00)  T(F): 99 (19 Feb 2018 14:00), Max: 99 (18 Feb 2018 18:00)  HR: 82 (19 Feb 2018 12:14) (80 - 92)  BP: 110/67 (19 Feb 2018 12:14) (101/56 - 117/57)  BP(mean): 84 (19 Feb 2018 12:14) (74 - 84)  RR: 16 (19 Feb 2018 12:14) (16 - 16)  SpO2: 98% (19 Feb 2018 12:14) (96% - 98%)    02-18 @ 07:01  -  02-19 @ 07:00  --------------------------------------------------------  IN: 550 mL / OUT: 900 mL / NET: -350 mL    02-19 @ 07:01 - 02-19 @ 15:56  --------------------------------------------------------  IN: 50 mL / OUT: 1200 mL / NET: -1150 mL        PHYSICAL EXAM:    General: Well developed; well nourished; in no acute distress  HEENT: MMM, conjunctiva and sclera clear  Gastrointestinal: Soft non-tender non-distended; Normal bowel sounds; No hepatosplenomegaly. No rebound or guarding    Skin: Warm and dry. No obvious rash    LABS:  CBC Full  -  ( 19 Feb 2018 06:41 )  WBC Count : 6.0 K/uL  Hemoglobin : 7.9 g/dL  Hematocrit : 22.7 %  Platelet Count - Automated : 153 K/uL  Mean Cell Volume : 85.0 fL  Mean Cell Hemoglobin : 29.6 pg  Mean Cell Hemoglobin Concentration : 34.8 g/dL  Auto Neutrophil # : x  Auto Lymphocyte # : x  Auto Monocyte # : x  Auto Eosinophil # : x  Auto Basophil # : x  Auto Neutrophil % : x  Auto Lymphocyte % : x  Auto Monocyte % : x  Auto Eosinophil % : x  Auto Basophil % : x    02-19    141  |  105  |  10  ----------------------------<  97  4.0   |  28  |  1.07    Ca    8.4      19 Feb 2018 06:41  Phos  3.4     02-19  Mg     1.7     02-19                      RADIOLOGY & ADDITIONAL STUDIES:

## 2018-02-19 NOTE — PROGRESS NOTE ADULT - SUBJECTIVE AND OBJECTIVE BOX
Had intermittent first degree heart block. Back nop normal rythm.  Asymptomatic.  No abdominal pain.  Slowly trending down H&H.    Meckel's scan reading pending. Contacted radiology multiple times.    recall GI - possibly needs repeat colonoscopy due to progressively lowering H&H.

## 2018-02-19 NOTE — PROGRESS NOTE ADULT - ASSESSMENT
Pt is a 29 yo M with IBS-D presenting with dark blood per rectum    #hematochezia - s/p EGD with push to D4, normal appearing mucosa. and Colonoscopy wiht old blood throughout colon, more fresh blood as cecum was approached. Cecum had appearance of ileo-cecal end to side anastamosis. Anastamosis  displayed multiple ulcers with clean base s/p biopsy, and one intermittently oozing ulcer at edge of anastamosis s/p hemoclip. neoterminal ileum did not show ulceration in the first 5cm  findings consistent with anastamotic ulceration from chronic ischemia vs potential undiagnosed crohns disease in setting of long history of diarrhea symptoms  However potentially more likely explaining his previous diarrhea is bile acid diarrhea in the setting of what we now know is an ieal resection  Hb dropped some more, but no further hematochezia for last 2 days   -replete b12  -obtain surgical report and path from previous surgery  -if not crohns, may need surgical revision of anastomosis  -cholestyramine for possible bile acid diarrhea, also to improve ulcer healing  -f/u Dr. Gallego as outpatient     please reconsult as needed Pt is a 27 yo M with IBS-D presenting with dark blood per rectum    #hematochezia - s/p EGD with push to D4, normal appearing mucosa. and Colonoscopy wiht old blood throughout colon, more fresh blood as cecum was approached. Cecum had appearance of ileo-cecal end to side anastamosis. Anastamosis  displayed multiple ulcers with clean base s/p biopsy, and one intermittently oozing ulcer at edge of anastamosis s/p hemoclip. neoterminal ileum did not show ulceration in the first 5cm  findings consistent with anastamotic ulceration from chronic ischemia vs potential undiagnosed crohns disease in setting of long history of diarrhea symptoms  However potentially more likely explaining his previous diarrhea is bile acid diarrhea in the setting of what we now know is an ieal resection  Hb dropped some more, but no further hematochezia for last 2 days   -replete b12  -obtain surgical report and path from previous surgery  -if not crohns, may need surgical revision of anastomosis  -cholestyramine for possible bile acid diarrhea, also to improve ulcer healing  -f/u Dr. Gallego as outpatient Pt is a 29 yo M with IBS-D presenting with dark blood per rectum    #hematochezia - s/p EGD with push to D4, normal appearing mucosa. and Colonoscopy wiht old blood throughout colon, more fresh blood as cecum was approached. Cecum had appearance of ileo-cecal end to side anastamosis. Anastamosis  displayed multiple ulcers with clean base s/p biopsy, and one intermittently oozing ulcer at edge of anastamosis s/p hemoclip. neoterminal ileum did not show ulceration in the first 5cm, IBD is low on differential, but possible.   findings consistent with anastamotic ulceration from chronic ischemia vs potential undiagnosed crohns disease in setting of long history of diarrhea symptoms  However potentially more likely explaining his previous diarrhea is bile acid diarrhea in the setting of what we now know is an ieal resection  Hb dropped some more, but no further hematochezia for last 2 days Majority of Hb drop occurred prior to procedure  -CRP normal, f/u stool calprotectin.  -f/u meckels scan result  -replete b12  -obtain surgical report and path from previous surgery  -if not crohns, may need surgical revision of anastomosis  -cholestyramine for possible bile acid diarrhea, also to improve ulcer healing  -f/u Dr. Gallego as outpatient

## 2018-02-20 LAB
ANION GAP SERPL CALC-SCNC: 10 MMOL/L — SIGNIFICANT CHANGE UP (ref 5–17)
BUN SERPL-MCNC: 13 MG/DL — SIGNIFICANT CHANGE UP (ref 7–23)
CALCIUM SERPL-MCNC: 8.3 MG/DL — LOW (ref 8.4–10.5)
CHLORIDE SERPL-SCNC: 103 MMOL/L — SIGNIFICANT CHANGE UP (ref 96–108)
CO2 SERPL-SCNC: 25 MMOL/L — SIGNIFICANT CHANGE UP (ref 22–31)
CREAT SERPL-MCNC: 1.01 MG/DL — SIGNIFICANT CHANGE UP (ref 0.5–1.3)
GLUCOSE SERPL-MCNC: 108 MG/DL — HIGH (ref 70–99)
HCT VFR BLD CALC: 23.3 % — LOW (ref 39–50)
HGB BLD-MCNC: 7.7 G/DL — LOW (ref 13–17)
MAGNESIUM SERPL-MCNC: 1.8 MG/DL — SIGNIFICANT CHANGE UP (ref 1.6–2.6)
MCHC RBC-ENTMCNC: 28.8 PG — SIGNIFICANT CHANGE UP (ref 27–34)
MCHC RBC-ENTMCNC: 33 G/DL — SIGNIFICANT CHANGE UP (ref 32–36)
MCV RBC AUTO: 87.3 FL — SIGNIFICANT CHANGE UP (ref 80–100)
PHOSPHATE SERPL-MCNC: 4.3 MG/DL — SIGNIFICANT CHANGE UP (ref 2.5–4.5)
PLATELET # BLD AUTO: 171 K/UL — SIGNIFICANT CHANGE UP (ref 150–400)
POTASSIUM SERPL-MCNC: 3.6 MMOL/L — SIGNIFICANT CHANGE UP (ref 3.5–5.3)
POTASSIUM SERPL-SCNC: 3.6 MMOL/L — SIGNIFICANT CHANGE UP (ref 3.5–5.3)
RBC # BLD: 2.67 M/UL — LOW (ref 4.2–5.8)
RBC # FLD: 12.2 % — SIGNIFICANT CHANGE UP (ref 10.3–16.9)
SODIUM SERPL-SCNC: 138 MMOL/L — SIGNIFICANT CHANGE UP (ref 135–145)
SURGICAL PATHOLOGY STUDY: SIGNIFICANT CHANGE UP
WBC # BLD: 5.9 K/UL — SIGNIFICANT CHANGE UP (ref 3.8–10.5)
WBC # FLD AUTO: 5.9 K/UL — SIGNIFICANT CHANGE UP (ref 3.8–10.5)

## 2018-02-20 PROCEDURE — 74177 CT ABD & PELVIS W/CONTRAST: CPT | Mod: 26

## 2018-02-20 RX ORDER — PANTOPRAZOLE SODIUM 20 MG/1
40 TABLET, DELAYED RELEASE ORAL EVERY 12 HOURS
Qty: 0 | Refills: 0 | Status: DISCONTINUED | OUTPATIENT
Start: 2018-02-20 | End: 2018-02-21

## 2018-02-20 RX ORDER — MAGNESIUM SULFATE 500 MG/ML
1 VIAL (ML) INJECTION ONCE
Qty: 0 | Refills: 0 | Status: COMPLETED | OUTPATIENT
Start: 2018-02-20 | End: 2018-02-20

## 2018-02-20 RX ORDER — AMINOCAPROIC ACID 500 MG/1
1 TABLET ORAL
Qty: 5 | Refills: 0 | Status: DISCONTINUED | OUTPATIENT
Start: 2018-02-20 | End: 2018-02-21

## 2018-02-20 RX ORDER — DIATRIZOATE MEGLUMINE 180 MG/ML
30 INJECTION, SOLUTION INTRAVESICAL ONCE
Qty: 0 | Refills: 0 | Status: COMPLETED | OUTPATIENT
Start: 2018-02-20 | End: 2018-02-20

## 2018-02-20 RX ORDER — POTASSIUM CHLORIDE 20 MEQ
40 PACKET (EA) ORAL ONCE
Qty: 0 | Refills: 0 | Status: COMPLETED | OUTPATIENT
Start: 2018-02-20 | End: 2018-02-20

## 2018-02-20 RX ORDER — AMINOCAPROIC ACID 500 MG/1
5 TABLET ORAL
Qty: 5 | Refills: 0 | Status: COMPLETED | OUTPATIENT
Start: 2018-02-20 | End: 2018-02-20

## 2018-02-20 RX ORDER — PANTOPRAZOLE SODIUM 20 MG/1
40 TABLET, DELAYED RELEASE ORAL DAILY
Qty: 0 | Refills: 0 | Status: DISCONTINUED | OUTPATIENT
Start: 2018-02-20 | End: 2018-02-20

## 2018-02-20 RX ADMIN — ONDANSETRON 4 MILLIGRAM(S): 8 TABLET, FILM COATED ORAL at 09:41

## 2018-02-20 RX ADMIN — DIATRIZOATE MEGLUMINE 30 MILLILITER(S): 180 INJECTION, SOLUTION INTRAVESICAL at 14:13

## 2018-02-20 RX ADMIN — CHOLESTYRAMINE 4 GRAM(S): 4 POWDER, FOR SUSPENSION ORAL at 09:42

## 2018-02-20 RX ADMIN — AMINOCAPROIC ACID 250 GM/HR: 500 TABLET ORAL at 14:13

## 2018-02-20 RX ADMIN — PANTOPRAZOLE SODIUM 40 MILLIGRAM(S): 20 TABLET, DELAYED RELEASE ORAL at 14:13

## 2018-02-20 RX ADMIN — Medication 100 GRAM(S): at 09:41

## 2018-02-20 RX ADMIN — Medication 40 MILLIEQUIVALENT(S): at 09:42

## 2018-02-20 NOTE — PROGRESS NOTE ADULT - SUBJECTIVE AND OBJECTIVE BOX
ON: VETO, No BM/Bleeding  2/19: in and out of 1st degree HB, cards consulted-NTD, pending meckel's read          27 yo M with lower GI bleed. Hemodynamically stable, Hb stable. Possibly small bowel in origin, but need to r/o more common colorectal source first.     - Nausea control PRN  - Regular diet  -Cholestyramine  - F/U AM labs  - monitor UOP  - monitor for further hematochezia  - SCDs, no SQH for now  - incentive spirometer. SUBJECTIVE: Patient seen and examined bedside by chief resident. ON: VETO, No BM/Bleeding        Vital Signs Last 24 Hrs  T(C): 36.4 (20 Feb 2018 05:30), Max: 37.3 (19 Feb 2018 18:00)  T(F): 97.5 (20 Feb 2018 05:30), Max: 99.2 (19 Feb 2018 18:00)  HR: 66 (20 Feb 2018 05:27) (66 - 92)  BP: 118/57 (20 Feb 2018 05:27) (101/56 - 126/58)  BP(mean): 77 (20 Feb 2018 05:27) (74 - 84)  RR: 14 (20 Feb 2018 05:27) (14 - 16)  SpO2: 98% (20 Feb 2018 05:27) (96% - 99%)  I&O's Detail    19 Feb 2018 07:01  -  20 Feb 2018 07:00  --------------------------------------------------------  IN:    IV PiggyBack: 50 mL  Total IN: 50 mL    OUT:    Voided: 2100 mL  Total OUT: 2100 mL    Total NET: -2050 mL          General: NAD, resting comfortably in bed  Pulm: Nonlabored breathing, no respiratory distress  Abd: soft, NT/ND.      LABS:                        7.9    6.0   )-----------( 153      ( 19 Feb 2018 06:41 )             22.7     02-19    141  |  105  |  10  ----------------------------<  97  4.0   |  28  |  1.07    Ca    8.4      19 Feb 2018 06:41  Phos  3.4     02-19  Mg     1.7     02-19            RADIOLOGY & ADDITIONAL STUDIES:

## 2018-02-20 NOTE — PROGRESS NOTE ADULT - ASSESSMENT
27 yo M with lower GI bleed. Hemodynamically stable, Hb stable. Possibly small bowel in origin, but need to r/o more common colorectal source first.     - Nausea control PRN  - Regular diet  -Cholestyramine  - F/U AM labs  - monitor UOP  - monitor for further hematochezia  - SCDs, no SQH for now  - incentive spirometer.

## 2018-02-20 NOTE — PROGRESS NOTE ADULT - ASSESSMENT
28yr old M with PMHx significant for Intussusception as a child sp surgery, Hx of GI bleed 2013 (unclear etiology) sp further small bowel resection in [place], IBS-D who was admitted for hematochezia.    #Hematochezia -   - sp EGD with push to D4, showed normal appearing mucosa  - sp Colonoscopy with old blood throughout colon, fresh blood in cecum, and multiple ulcers at ileo-cecal anastomosis, with oozing at one ulcer sp hemoclip  - pathology - shows active chronic enteritis with cryptitis and crypt architecture distortion  - Meckels scan - negative  - please try to obtain reports from previous surgeries  - will recommend CT enterography to evaluate small bowel further and also send off c-ANCA, p-ANCA and ASCA    # Diarrhea -  - likely due to short gut with bile acid diarrhea  - uncelar how much small bowel was resected in his previous surgeries  - started on cholestyramine    Discussed with attending Dr Hill  GI following

## 2018-02-20 NOTE — PROGRESS NOTE ADULT - SUBJECTIVE AND OBJECTIVE BOX
Continues with slow blood loss. no BPR.    Became orthostatic this Am. recieved 1PRBC. Feels better.  no abdominal pain.  reviewed records from previous admission in 2013.  Per records Pt had three episodes of GI bleed prior to 2013.   Had multiple tests. But no definitive reason found for GI bleed. Was planned but did not undergo double balloon enteroscopy.  Surgical pathology report is missing in documents faxed from [place]. Will request separately.    Pt confirms info above.  Will request second opinion GI.  CT abdomen/pelvis.  Aminocapronic acid  Colonoscopy biopsy - test for CMV.    Most likely will need double balloon enteroscopy.   Pt understands.

## 2018-02-20 NOTE — PROGRESS NOTE ADULT - SUBJECTIVE AND OBJECTIVE BOX
Pt seen and examined at bedside  eating breakfast with no new c/o  Denies further episodes of bleeding  Denies n/v/d, abdominal pain      MEDICATIONS:  MEDICATIONS  (STANDING):  aminocaproic acid Infusion 1 Gm/Hr (50 mL/Hr) IV Continuous <Continuous>  cholestyramine Powder (Sugar-Free) 4 Gram(s) Oral daily  pantoprazole  Injectable 40 milliGRAM(s) IV Push every 12 hours    MEDICATIONS  (PRN):  ondansetron Injectable 4 milliGRAM(s) IV Push every 6 hours PRN Nausea    Allergies  No Known Allergies    Intolerances      Vital Signs Last 24 Hrs  T(C): 37.3 (20 Feb 2018 14:11), Max: 37.3 (19 Feb 2018 18:00)  T(F): 99.1 (20 Feb 2018 14:11), Max: 99.2 (19 Feb 2018 18:00)  HR: 78 (20 Feb 2018 14:02) (66 - 84)  BP: 118/61 (20 Feb 2018 14:02) (105/58 - 126/58)  BP(mean): 83 (20 Feb 2018 14:02) (77 - 83)  RR: 18 (20 Feb 2018 14:02) (14 - 18)  SpO2: 100% (20 Feb 2018 14:02) (98% - 100%)    02-19 @ 07:01 - 02-20 @ 07:00  --------------------------------------------------------  IN: 50 mL / OUT: 2100 mL / NET: -2050 mL    02-20 @ 07:01 - 02-20 @ 15:45  --------------------------------------------------------  IN: 450 mL / OUT: 1550 mL / NET: -1100 mL      PHYSICAL EXAM:  General: Well developed; well nourished; in no acute distress  HEENT: MMM, conjunctiva and sclera clear  Gastrointestinal: Soft, BS+, NT, NR, NG, no masses or organs palpated  Skin: Warm and dry. No obvious rash    LABS:  CBC Full  -  ( 20 Feb 2018 07:09 )  WBC Count : 5.9 K/uL  Hemoglobin : 7.7 g/dL  Hematocrit : 23.3 %  Platelet Count - Automated : 171 K/uL  Mean Cell Volume : 87.3 fL  Mean Cell Hemoglobin : 28.8 pg  Mean Cell Hemoglobin Concentration : 33.0 g/dL    138  |  103  |  13  ----------------------------<  108<H>  3.6   |  25  |  1.01    Ca    8.3<L>      20 Feb 2018 07:09  Phos  4.3     02-20  Mg     1.8     02-20          RADIOLOGY & ADDITIONAL STUDIES (The following images were personally reviewed):  Surgical Pathology Report (02.17.18 @ 10:30)    Surgical Pathology Report:   ACCESSION No:  [id number]    GIDEON TIMBO                      1        Surgical Final Report          Final Diagnosis    Small bowel, biopsy:  - 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    Specimen(s) Submitted  Small bowel biopsy    Gross Description  The specimen is received in formalin, labeled with the patient's  identification and "small bowel biopsy." It consists of one  irregular fragment of tan soft tissue, measuring 0.3 cm in  greatest dimension. The specimen is entirely submitted in one  cassette as, 1A.  EF 02/19/18 10:40

## 2018-02-21 ENCOUNTER — TRANSCRIPTION ENCOUNTER (OUTPATIENT)
Age: 28
End: 2018-02-21

## 2018-02-21 VITALS — TEMPERATURE: 99 F

## 2018-02-21 LAB
ANION GAP SERPL CALC-SCNC: 10 MMOL/L — SIGNIFICANT CHANGE UP (ref 5–17)
BUN SERPL-MCNC: 8 MG/DL — SIGNIFICANT CHANGE UP (ref 7–23)
CALCIUM SERPL-MCNC: 8.6 MG/DL — SIGNIFICANT CHANGE UP (ref 8.4–10.5)
CHLORIDE SERPL-SCNC: 106 MMOL/L — SIGNIFICANT CHANGE UP (ref 96–108)
CO2 SERPL-SCNC: 25 MMOL/L — SIGNIFICANT CHANGE UP (ref 22–31)
CREAT SERPL-MCNC: 1.07 MG/DL — SIGNIFICANT CHANGE UP (ref 0.5–1.3)
GLUCOSE SERPL-MCNC: 99 MG/DL — SIGNIFICANT CHANGE UP (ref 70–99)
HCT VFR BLD CALC: 25.6 % — LOW (ref 39–50)
HGB BLD-MCNC: 8.7 G/DL — LOW (ref 13–17)
MCHC RBC-ENTMCNC: 29.3 PG — SIGNIFICANT CHANGE UP (ref 27–34)
MCHC RBC-ENTMCNC: 34 G/DL — SIGNIFICANT CHANGE UP (ref 32–36)
MCV RBC AUTO: 86.2 FL — SIGNIFICANT CHANGE UP (ref 80–100)
PLATELET # BLD AUTO: 184 K/UL — SIGNIFICANT CHANGE UP (ref 150–400)
POTASSIUM SERPL-MCNC: 4.1 MMOL/L — SIGNIFICANT CHANGE UP (ref 3.5–5.3)
POTASSIUM SERPL-SCNC: 4.1 MMOL/L — SIGNIFICANT CHANGE UP (ref 3.5–5.3)
RBC # BLD: 2.97 M/UL — LOW (ref 4.2–5.8)
RBC # FLD: 13.1 % — SIGNIFICANT CHANGE UP (ref 10.3–16.9)
SODIUM SERPL-SCNC: 141 MMOL/L — SIGNIFICANT CHANGE UP (ref 135–145)
WBC # BLD: 6.4 K/UL — SIGNIFICANT CHANGE UP (ref 3.8–10.5)
WBC # FLD AUTO: 6.4 K/UL — SIGNIFICANT CHANGE UP (ref 3.8–10.5)

## 2018-02-21 PROCEDURE — 86900 BLOOD TYPING SEROLOGIC ABO: CPT

## 2018-02-21 PROCEDURE — 85025 COMPLETE CBC W/AUTO DIFF WBC: CPT

## 2018-02-21 PROCEDURE — 99285 EMERGENCY DEPT VISIT HI MDM: CPT | Mod: 25

## 2018-02-21 PROCEDURE — 96374 THER/PROPH/DIAG INJ IV PUSH: CPT

## 2018-02-21 PROCEDURE — 74177 CT ABD & PELVIS W/CONTRAST: CPT

## 2018-02-21 PROCEDURE — 36415 COLL VENOUS BLD VENIPUNCTURE: CPT

## 2018-02-21 PROCEDURE — 86901 BLOOD TYPING SEROLOGIC RH(D): CPT

## 2018-02-21 PROCEDURE — 36430 TRANSFUSION BLD/BLD COMPNT: CPT

## 2018-02-21 PROCEDURE — A9512: CPT

## 2018-02-21 PROCEDURE — 85730 THROMBOPLASTIN TIME PARTIAL: CPT

## 2018-02-21 PROCEDURE — 84100 ASSAY OF PHOSPHORUS: CPT

## 2018-02-21 PROCEDURE — 83735 ASSAY OF MAGNESIUM: CPT

## 2018-02-21 PROCEDURE — 86850 RBC ANTIBODY SCREEN: CPT

## 2018-02-21 PROCEDURE — 86140 C-REACTIVE PROTEIN: CPT

## 2018-02-21 PROCEDURE — 82607 VITAMIN B-12: CPT

## 2018-02-21 PROCEDURE — 83605 ASSAY OF LACTIC ACID: CPT

## 2018-02-21 PROCEDURE — 96375 TX/PRO/DX INJ NEW DRUG ADDON: CPT

## 2018-02-21 PROCEDURE — 88305 TISSUE EXAM BY PATHOLOGIST: CPT

## 2018-02-21 PROCEDURE — 93005 ELECTROCARDIOGRAM TRACING: CPT

## 2018-02-21 PROCEDURE — 78290 INTESTINE IMAGING: CPT

## 2018-02-21 PROCEDURE — 85027 COMPLETE CBC AUTOMATED: CPT

## 2018-02-21 PROCEDURE — P9016: CPT

## 2018-02-21 PROCEDURE — 80048 BASIC METABOLIC PNL TOTAL CA: CPT

## 2018-02-21 PROCEDURE — 85610 PROTHROMBIN TIME: CPT

## 2018-02-21 PROCEDURE — 86923 COMPATIBILITY TEST ELECTRIC: CPT

## 2018-02-21 PROCEDURE — 80053 COMPREHEN METABOLIC PANEL: CPT

## 2018-02-21 RX ORDER — CHOLESTYRAMINE 4 G/9G
4 POWDER, FOR SUSPENSION ORAL
Qty: 150 | Refills: 0 | OUTPATIENT
Start: 2018-02-21 | End: 2018-03-22

## 2018-02-21 RX ADMIN — CHOLESTYRAMINE 4 GRAM(S): 4 POWDER, FOR SUSPENSION ORAL at 09:59

## 2018-02-21 RX ADMIN — PANTOPRAZOLE SODIUM 40 MILLIGRAM(S): 20 TABLET, DELAYED RELEASE ORAL at 02:00

## 2018-02-21 NOTE — PROGRESS NOTE ADULT - ASSESSMENT
28yr old M with PMHx significant for Intussusception as a child sp surgery, Hx of GI bleed 2013 (unclear etiology) sp further small bowel resection in [place], IBS-D who was admitted for hematochezia.    #Hematochezia -   - sp EGD with push to D4, showed normal appearing mucosa  - sp Colonoscopy with old blood throughout colon, fresh blood in cecum, and multiple ulcers at ileo-cecal anastomosis, with oozing at one ulcer sp hemoclip  - pathology - shows active chronic enteritis with cryptitis and crypt architecture distortion   - Meckels scan - negative  - please try to obtain reports from previous surgeries  - pathology suggestive of inflammatory bowel disease  - will recommend CT enterography to evaluate small bowel further and also send off c-ANCA, p-ANCA and ASCA - pending    # Diarrhea -  - likely due to short gut with bile acid diarrhea  - unclear how much small bowel was resected in his previous surgeries  - started on cholestyramine    Discussed with attending Dr Hill  GI following

## 2018-02-21 NOTE — PROGRESS NOTE ADULT - SUBJECTIVE AND OBJECTIVE BOX
Pt seen and examined at bedside  No new c/o today  Denies further episodes of bleeding  Denies n/v/d, abdominal pain      MEDICATIONS:  MEDICATIONS  (STANDING):  aminocaproic acid Infusion 1 Gm/Hr (50 mL/Hr) IV Continuous <Continuous>  cholestyramine Powder (Sugar-Free) 4 Gram(s) Oral daily  pantoprazole  Injectable 40 milliGRAM(s) IV Push every 12 hours    MEDICATIONS  (PRN):  ondansetron Injectable 4 milliGRAM(s) IV Push every 6 hours PRN Nausea      Allergies  No Known Allergies    Intolerances      Vital Signs Last 24 Hrs  T(C): 36.4 (21 Feb 2018 05:37), Max: 37.3 (20 Feb 2018 14:11)  T(F): 97.5 (21 Feb 2018 05:37), Max: 99.1 (20 Feb 2018 14:11)  HR: 60 (21 Feb 2018 05:34) (60 - 84)  BP: 97/51 (21 Feb 2018 05:34) (92/47 - 123/63)  BP(mean): 72 (21 Feb 2018 05:34) (67 - 86)  RR: 16 (21 Feb 2018 05:34) (16 - 18)  SpO2: 100% (21 Feb 2018 05:34) (99% - 100%)    02-20 @ 07:01  -  02-21 @ 07:00  --------------------------------------------------------  IN: 3370 mL / OUT: 2676 mL / NET: 694 mL      PHYSICAL EXAM:  General: Well developed; well nourished; in no acute distress  HEENT: MMM, conjunctiva and sclera clear  Gastrointestinal: Soft, BS+, NT, NR, NG, no masses or organs palpated  Skin: Warm and dry. No obvious rash    LABS:  CBC Full  -  ( 21 Feb 2018 05:42 )  WBC Count : 6.4 K/uL  Hemoglobin : 8.7 g/dL  Hematocrit : 25.6 %  Platelet Count - Automated : 184 K/uL  Mean Cell Volume : 86.2 fL  Mean Cell Hemoglobin : 29.3 pg  Mean Cell Hemoglobin Concentration : 34.0 g/dL    141  |  106  |  8   ----------------------------<  99  4.1   |  25  |  1.07    Ca    8.6      21 Feb 2018 05:42  Phos  4.3     02-20  Mg     1.8     02-20          RADIOLOGY & ADDITIONAL STUDIES (The following images were personally reviewed):  CT Abdomen and Pelvis w/ Oral Cont and w/ IV Cont (02.20.18 @ 18:56) >  IMPRESSION:  Multiple small bowel resections as described above.  Mild diffuse colonic wall thickening could be due to underdistention or colitis.  Mildly prominent mesenteric lymph nodes in the right lower quadrant. No retroperitoneal lymphadenopathy.

## 2018-02-21 NOTE — DISCHARGE NOTE ADULT - PLAN OF CARE
Recovery Follow up with Dr. Garcia in 1-2 weeks and Dr. Gallego (gastroenterology) in 1-2 weeks. Call the offices to schedule your appointments. Ambulate as tolerated. You may resume regular diet. You should be urinating at least 3-4x per day. Call the office if you experience increasing pain, abdominal pain, hematochezia, nausea, vomiting, or temperature >101.4F.

## 2018-02-21 NOTE — DISCHARGE NOTE ADULT - HOSPITAL COURSE
Patient was admitted for blood DE. Patient underwent colonoscopy without any active bleeding. GI recommended cholestyramine for bile acid diarrhea. Meckel's scan negative. Hb downtrended and stabilized. Patient did not have any further bloody BMs during admission. BMs were normal. Patient was administered 1U PRBC and responded appropriately. Patient's clinical condition improved during hospital stay. Patient was hemodynamically stable, afebrile, and with a plan for followup upon discharge.

## 2018-02-21 NOTE — PROGRESS NOTE ADULT - SUBJECTIVE AND OBJECTIVE BOX
SUBJECTIVE: DENIES any complaints, DENIES N/V/CP, passing flatus and non bloody BM    MEDICATIONS  (STANDING):  aminocaproic acid Infusion 1 Gm/Hr (50 mL/Hr) IV Continuous <Continuous>  cholestyramine Powder (Sugar-Free) 4 Gram(s) Oral daily  pantoprazole  Injectable 40 milliGRAM(s) IV Push every 12 hours    MEDICATIONS  (PRN):  ondansetron Injectable 4 milliGRAM(s) IV Push every 6 hours PRN Nausea      Vital Signs Last 24 Hrs  T(C): 36.4 (21 Feb 2018 05:37), Max: 37.3 (20 Feb 2018 14:11)  T(F): 97.5 (21 Feb 2018 05:37), Max: 99.1 (20 Feb 2018 14:11)  HR: 60 (21 Feb 2018 05:34) (60 - 84)  BP: 97/51 (21 Feb 2018 05:34) (92/47 - 123/63)  BP(mean): 72 (21 Feb 2018 05:34) (67 - 86)  RR: 16 (21 Feb 2018 05:34) (16 - 18)  SpO2: 100% (21 Feb 2018 05:34) (98% - 100%)    PHYSICAL EXAM:      Constitutional: A&Ox3      Respiratory: non labored breathing, no respiratory distress    Cardiovascular: NSR, RRR    Gastrointestinal: sOFT nd,nt                     Genitourinary: voiding    Extremities: (-) edema                  I&O's Detail    20 Feb 2018 07:01  -  21 Feb 2018 07:00  --------------------------------------------------------  IN:    aminocaproic acid Infusion: 250 mL    aminocaproic acid Infusion: 200 mL    IV PiggyBack: 100 mL    Oral Fluid: 2470 mL    Packed Red Blood Cells: 350 mL  Total IN: 3370 mL    OUT:    Stool: 1 mL    Voided: 2675 mL  Total OUT: 2676 mL    Total NET: 694 mL          LABS:                        8.7    6.4   )-----------( 184      ( 21 Feb 2018 05:42 )             25.6     02-21    141  |  106  |  8   ----------------------------<  99  4.1   |  25  |  1.07    Ca    8.6      21 Feb 2018 05:42  Phos  4.3     02-20  Mg     1.8     02-20            RADIOLOGY & ADDITIONAL STUDIES:

## 2018-02-21 NOTE — DISCHARGE NOTE ADULT - PATIENT PORTAL LINK FT
You can access the [facility] Patient Portal, offered by [facility], by registering with the following website: [email or web address]

## 2018-02-21 NOTE — DIETITIAN INITIAL EVALUATION ADULT. - OTHER INFO
29y/o M admitted with GIB. Per GI note, pt likely with short gut- unsure of how much small bowel has been resected in the past. Pt seen resting in bed. He reports a good appetite and intake; consuming 100% of meals. Denies current N/V/D/C, pain, and mechanical issues. PTA pt reports eating a lot as he is constantly hungry and loves food. Denies wt changes. He states that all foods cause diarrhea and takes imodium at times, but not regularly. Pt with possible malabsorption issues; can consider a small bowel follow through or fecal fat test. He denies following with RD or ever trying and elimination type IBS diet. Provided OP RD contact information for further counseling after d/c and discussed avoiding high fat and concentrated sugar loads for D. Will follow.

## 2018-02-21 NOTE — PROGRESS NOTE ADULT - PROVIDER SPECIALTY LIST ADULT
Gastroenterology
Surgery
Gastroenterology

## 2018-02-21 NOTE — PROGRESS NOTE ADULT - SUBJECTIVE AND OBJECTIVE BOX
H&H stable. No orthostatic responce after transfusion.  No BPR.  Abdomne is soft, NT, ND.  Tolerates PO.  CT noted.  Ok to D/C home.   Follow up in 1-2 weeks.  Will need outpatient work up with CT enterography. Possibly double balloon enteroscopy.  Pt. is aware.

## 2018-02-21 NOTE — DISCHARGE NOTE ADULT - CARE PROVIDER_API CALL
Sebas Garcia), Surgery  [address] [address]  Phone: [phone]  Fax: [phone]    Harris Gallego), Gastroenterology  [address]  [address] [place] [address]  Phone: [phone]  Fax: [phone]

## 2018-02-21 NOTE — DISCHARGE NOTE ADULT - CARE PLAN
Principal Discharge DX:	Lower GI bleed  Goal:	Recovery  Assessment and plan of treatment:	Follow up with Dr. Garcia in 1-2 weeks and Dr. Gallego (gastroenterology) in 1-2 weeks. Call the offices to schedule your appointments. Ambulate as tolerated. You may resume regular diet. You should be urinating at least 3-4x per day. Call the office if you experience increasing pain, abdominal pain, hematochezia, nausea, vomiting, or temperature >101.4F.

## 2018-02-21 NOTE — PROGRESS NOTE ADULT - ASSESSMENT
27 yo M with lower GI bleed. Hemodynamically stable, Hb stable. Possibly small bowel in origin, but need to r/o more common colorectal source first.     - Nausea control PRN  - Regular diet  -Cholestyramine  - F/U AM labs  - monitor UOP  - monitor for further hematochezia  - SCDs, no SQH for now  - incentive spirometer.   possible d/c 29 yo M with lower GI bleed. Hemodynamically stable, Hb stable. Possibly small bowel in origin, but need to r/o more common colorectal source first, also with acute blood loss 1UNIT GIVEN    - Nausea control PRN  - Regular diet  -Cholestyramine  - F/U AM labs  - monitor UOP  - monitor for further hematochezia  - SCDs, no SQH for now  - incentive spirometer.   possible d/c 29 yo M with lower GI bleed. Hemodynamically stable, Hb stable. Possibly small bowel in origin, but need to r/o more common colorectal source first, also with acute blood loss anemia- 1UNIT GIVEN    - Nausea control PRN  - Regular diet  -Cholestyramine  - F/U AM labs  - monitor UOP  - monitor for further hematochezia  - SCDs, no SQH for now  - incentive spirometer.   possible d/c

## 2018-02-21 NOTE — DIETITIAN INITIAL EVALUATION ADULT. - PROBLEM SELECTOR PLAN 1
- NPO  - IVF,  cc/hr  - GI consulted (service, Dr. Gallego, spoke with fellow Dr. Haas) for colonoscopy in AM  - will prep tonight with GoLytely  - repeat Hb and T/S at [facility]  - AM labs: CBC, BMP, Mag, Phos  - monitor UOP  - serial abd exams  - monitor for further hematochezia  - SCDs, no SQH for now  - incentive spirometer

## 2018-02-21 NOTE — DISCHARGE NOTE ADULT - MEDICATION SUMMARY - MEDICATIONS TO TAKE
I will START or STAY ON the medications listed below when I get home from the hospital:    cholestyramine 4 g/5 g oral powder for reconstitution  -- 4 gram(s) by mouth once a day   -- Dilute this medication with liquid before administration.  It is very important that you take or use this exactly as directed.  Do not skip doses or discontinue unless directed by your doctor.  Medication should be taken with plenty of water.  Obtain medical advice before taking any non-prescription drugs as some may affect the action of this medication.    -- Indication: For Bile acid diarrhea

## 2018-02-26 DIAGNOSIS — D62 ACUTE POSTHEMORRHAGIC ANEMIA: ICD-10-CM

## 2018-02-26 DIAGNOSIS — R71.0 PRECIPITOUS DROP IN HEMATOCRIT: ICD-10-CM

## 2018-02-26 DIAGNOSIS — K92.1 MELENA: ICD-10-CM

## 2018-02-26 DIAGNOSIS — K90.9 INTESTINAL MALABSORPTION, UNSPECIFIED: ICD-10-CM

## 2018-02-26 DIAGNOSIS — K44.9 DIAPHRAGMATIC HERNIA WITHOUT OBSTRUCTION OR GANGRENE: ICD-10-CM

## 2018-02-26 DIAGNOSIS — K29.70 GASTRITIS, UNSPECIFIED, WITHOUT BLEEDING: ICD-10-CM

## 2018-02-26 DIAGNOSIS — K58.0 IRRITABLE BOWEL SYNDROME WITH DIARRHEA: ICD-10-CM

## 2018-02-26 DIAGNOSIS — K28.4 CHRONIC OR UNSPECIFIED GASTROJEJUNAL ULCER WITH HEMORRHAGE: ICD-10-CM

## 2018-02-26 DIAGNOSIS — K62.89 OTHER SPECIFIED DISEASES OF ANUS AND RECTUM: ICD-10-CM

## 2018-02-26 DIAGNOSIS — I95.1 ORTHOSTATIC HYPOTENSION: ICD-10-CM

## 2018-04-20 ENCOUNTER — INPATIENT (INPATIENT)
Facility: HOSPITAL | Age: 28
LOS: 0 days | Discharge: HOME | DRG: 378 | End: 2018-04-21
Attending: SURGERY | Admitting: SURGERY
Payer: COMMERCIAL

## 2018-04-20 VITALS
SYSTOLIC BLOOD PRESSURE: 144 MMHG | OXYGEN SATURATION: 100 % | DIASTOLIC BLOOD PRESSURE: 92 MMHG | TEMPERATURE: 98 F | HEIGHT: 71 IN | WEIGHT: 160.06 LBS | HEART RATE: 93 BPM | RESPIRATION RATE: 18 BRPM

## 2018-04-20 DIAGNOSIS — Z90.49 ACQUIRED ABSENCE OF OTHER SPECIFIED PARTS OF DIGESTIVE TRACT: ICD-10-CM

## 2018-04-20 DIAGNOSIS — D62 ACUTE POSTHEMORRHAGIC ANEMIA: ICD-10-CM

## 2018-04-20 DIAGNOSIS — K91.2 POSTSURGICAL MALABSORPTION, NOT ELSEWHERE CLASSIFIED: ICD-10-CM

## 2018-04-20 DIAGNOSIS — Z98.890 OTHER SPECIFIED POSTPROCEDURAL STATES: Chronic | ICD-10-CM

## 2018-04-20 PROBLEM — K92.2 GASTROINTESTINAL HEMORRHAGE, UNSPECIFIED: Chronic | Status: ACTIVE | Noted: 2018-02-16

## 2018-04-20 LAB
CRP SERPL-MCNC: 0.11 MG/DL — SIGNIFICANT CHANGE UP (ref 0–0.4)
HCT VFR BLD CALC: 27.7 % — LOW (ref 39–50)
HCT VFR BLD CALC: 28 % — LOW (ref 39–50)
HCT VFR BLD CALC: 28.4 % — LOW (ref 39–50)
HGB BLD-MCNC: 8.2 G/DL — LOW (ref 13–17)
HGB BLD-MCNC: 8.7 G/DL — LOW (ref 13–17)
HGB BLD-MCNC: 8.8 G/DL — LOW (ref 13–17)
MCHC RBC-ENTMCNC: 21.6 PG — LOW (ref 27–34)
MCHC RBC-ENTMCNC: 22.2 PG — LOW (ref 27–34)
MCHC RBC-ENTMCNC: 22.3 PG — LOW (ref 27–34)
MCHC RBC-ENTMCNC: 29.6 G/DL — LOW (ref 32–36)
MCHC RBC-ENTMCNC: 31 G/DL — LOW (ref 32–36)
MCHC RBC-ENTMCNC: 31.1 G/DL — LOW (ref 32–36)
MCV RBC AUTO: 71.4 FL — LOW (ref 80–100)
MCV RBC AUTO: 71.9 FL — LOW (ref 80–100)
MCV RBC AUTO: 72.9 FL — LOW (ref 80–100)
PLATELET # BLD AUTO: 215 K/UL — SIGNIFICANT CHANGE UP (ref 150–400)
PLATELET # BLD AUTO: 223 K/UL — SIGNIFICANT CHANGE UP (ref 150–400)
PLATELET # BLD AUTO: 255 K/UL — SIGNIFICANT CHANGE UP (ref 150–400)
RBC # BLD: 3.8 M/UL — LOW (ref 4.2–5.8)
RBC # BLD: 3.92 M/UL — LOW (ref 4.2–5.8)
RBC # BLD: 3.95 M/UL — LOW (ref 4.2–5.8)
RBC # FLD: 16.4 % — SIGNIFICANT CHANGE UP (ref 10.3–16.9)
RBC # FLD: 16.7 % — SIGNIFICANT CHANGE UP (ref 10.3–16.9)
RBC # FLD: 16.9 % — SIGNIFICANT CHANGE UP (ref 10.3–16.9)
WBC # BLD: 10.8 K/UL — HIGH (ref 3.8–10.5)
WBC # BLD: 6.1 K/UL — SIGNIFICANT CHANGE UP (ref 3.8–10.5)
WBC # BLD: 6.3 K/UL — SIGNIFICANT CHANGE UP (ref 3.8–10.5)
WBC # FLD AUTO: 10.8 K/UL — HIGH (ref 3.8–10.5)
WBC # FLD AUTO: 6.1 K/UL — SIGNIFICANT CHANGE UP (ref 3.8–10.5)
WBC # FLD AUTO: 6.3 K/UL — SIGNIFICANT CHANGE UP (ref 3.8–10.5)

## 2018-04-20 PROCEDURE — 99285 EMERGENCY DEPT VISIT HI MDM: CPT | Mod: 25

## 2018-04-20 RX ORDER — SODIUM CHLORIDE 9 MG/ML
1000 INJECTION INTRAMUSCULAR; INTRAVENOUS; SUBCUTANEOUS
Qty: 0 | Refills: 0 | Status: DISCONTINUED | OUTPATIENT
Start: 2018-04-20 | End: 2018-04-21

## 2018-04-20 RX ADMIN — SODIUM CHLORIDE 125 MILLILITER(S): 9 INJECTION INTRAMUSCULAR; INTRAVENOUS; SUBCUTANEOUS at 12:14

## 2018-04-20 RX ADMIN — SODIUM CHLORIDE 125 MILLILITER(S): 9 INJECTION INTRAMUSCULAR; INTRAVENOUS; SUBCUTANEOUS at 05:21

## 2018-04-20 NOTE — H&P ADULT - ASSESSMENT
29 yo male with recurrent GI bleed      - admit to surgery under care of Dr. Garcia   - NPO   - IVF hydration   - trend H&H (next in am labs), transfuse as needed   - GI consult in am    - regional level of care   - discussed with chief resident and Dr. Garcia

## 2018-04-20 NOTE — H&P ADULT - ATTENDING COMMENTS
Pt known to me.  Comes with recurrent GI bleed per rectum.  Acute blood loss anemia again.  Pt failed to follow through with the plan formulated at previous discharge.  Admit to surgical service.  Trend H&H, consult GI.  CTA abdomen.  NPO for now.

## 2018-04-20 NOTE — CONSULT NOTE ADULT - SUBJECTIVE AND OBJECTIVE BOX
HPI:  28yr old M with PMHx significant for small bowel resection x 2 (as a child for ileo-cecal intussusception/ and distal SBR in 2013 in [place] while admitted to SICU for GI bleed), recent admission for lower GI bleed (2/2018, that required pRBC transfusions, underwent EGD with push to D4 that was unrevealing and colonoscopy that showed multiple ulcers at prior ileo-cecal anastomosis (biopsies showed active chronic enteritis), discharged home in stable condition with plan for outpatient CT enterography, who presents for evaluation of GI bleed.         (not yet performed), today presents with 1 episode of large dark red blood per rectum with only scant amount of stool. Another episode in the ED. Denies nausea, emesis, abdominal pain, recent diarrhea or constipation, lightheadedenss or dizziness. has no other complaints.    Hemodynamically stable, afebrile. Hgb 9.5 (at discharge 8.7) (20 Apr 2018 05:03)      PAST MEDICAL & SURGICAL HISTORY:  Stomach ulcer  Irritable bowel syndrome with diarrhea  GI bleeding  History of bowel resection: SMALL BOWEL      REVIEW OF SYSTEMS      General:	    Skin/Breast:  	  Ophthalmologic:  	  ENMT:	    Respiratory and Thorax:  	  Cardiovascular:	    Gastrointestinal:	    Genitourinary:	    Musculoskeletal:	    Neurological:	    Psychiatric:	    Hematology/Lymphatics:	    Endocrine:	    Allergic/Immunologic:	    MEDICATIONS  (STANDING):  sodium chloride 0.9%. 1000 milliLiter(s) (125 mL/Hr) IV Continuous <Continuous>    MEDICATIONS  (PRN):      Allergies    No Known Allergies    Intolerances        SOCIAL HISTORY:    FAMILY HISTORY:  Family history of gastric cancer (Mother)  Family history of Meckel's diverticulum (Father)      Vital Signs Last 24 Hrs  T(C): 36.6 (20 Apr 2018 05:30), Max: 36.6 (20 Apr 2018 01:00)  T(F): 97.8 (20 Apr 2018 05:30), Max: 97.9 (20 Apr 2018 01:00)  HR: 85 (20 Apr 2018 05:30) (85 - 93)  BP: 116/73 (20 Apr 2018 05:30) (116/73 - 144/92)  BP(mean): --  RR: 17 (20 Apr 2018 05:30) (17 - 18)  SpO2: 99% (20 Apr 2018 05:30) (99% - 100%)    PHYSICAL EXAM:      Constitutional:    Eyes:    ENMT:    Neck:    Breasts:    Back:    Respiratory:    Cardiovascular:    Gastrointestinal:    Genitourinary:    Rectal:    Extremities:    Vascular:    Neurological:    Skin:    Lymph Nodes:    Musculoskeletal:    Psychiatric:        LABS:                        8.7    6.3   )-----------( 223      ( 20 Apr 2018 12:52 )             28.0     04-20    133<L>  |  100  |  15  ----------------------------<  104<H>  3.9   |  23  |  1.03    Ca    8.8      20 Apr 2018 01:32    TPro  6.3  /  Alb  3.9  /  TBili  <0.2  /  DBili  x   /  AST  16  /  ALT  15  /  AlkPhos  54  04-20    PT/INR - ( 20 Apr 2018 01:32 )   PT: 11.6 sec;   INR: 1.04          PTT - ( 20 Apr 2018 01:32 )  PTT:28.4 sec      RADIOLOGY & ADDITIONAL STUDIES: HPI:  28yr old M with PMHx significant for small bowel resection x 2 (as a child for ileo-cecal intussusception; distal SBR in 2013 in [place] while admitted to SICU for GI bleed), recent admission for lower GI bleed (2/2018, that required pRBC transfusions, underwent EGD with push to D4 that was unrevealing and colonoscopy that showed multiple ulcers at prior ileo-cecal anastomosis (sp hemoclip and biopsies which showed active chronic enteritis), chronic diarrhea likely 2/2 short gut currently on cholestyramine who presents for evaluation of GI bleed.  According to patient he had a large rectal bleed last nite with dark red blood per rectum, and on presentation to the Ed had 2 more episodes, with minimal stool. He denies any abdominal pain/distention, fever, chills, n/v, dysphagia, odynophagia, weight loss, lightheadedness, dizziness, falls/LOC.    EGD -   Colonoscopy -       PAST MEDICAL & SURGICAL HISTORY:  Stomach ulcer  Irritable bowel syndrome with diarrhea  GI bleeding  History of bowel resection: SMALL BOWEL      REVIEW OF SYSTEMS  Apart from items noted in the HPI a 10point ROS was negative  	    MEDICATIONS  (STANDING):  sodium chloride 0.9%. 1000 milliLiter(s) (125 mL/Hr) IV Continuous <Continuous>    MEDICATIONS  (PRN):      Allergies  No Known Allergies    Intolerances      SOCIAL HISTORY:    FAMILY HISTORY:  Family history of gastric cancer (Mother)  Family history of Meckel's diverticulum (Father)      Vital Signs Last 24 Hrs  T(C): 36.6 (20 Apr 2018 05:30), Max: 36.6 (20 Apr 2018 01:00)  T(F): 97.8 (20 Apr 2018 05:30), Max: 97.9 (20 Apr 2018 01:00)  HR: 85 (20 Apr 2018 05:30) (85 - 93)  BP: 116/73 (20 Apr 2018 05:30) (116/73 - 144/92)  BP(mean): --  RR: 17 (20 Apr 2018 05:30) (17 - 18)  SpO2: 99% (20 Apr 2018 05:30) (99% - 100%)    PHYSICAL EXAM:      Constitutional:    Eyes:    ENMT:    Neck:    Breasts:    Back:    Respiratory:    Cardiovascular:    Gastrointestinal:    Genitourinary:    Rectal:    Extremities:    Vascular:    Neurological:    Skin:    Lymph Nodes:    Musculoskeletal:    Psychiatric:        LABS:                        8.7    6.3   )-----------( 223      ( 20 Apr 2018 12:52 )             28.0     04-20    133<L>  |  100  |  15  ----------------------------<  104<H>  3.9   |  23  |  1.03    Ca    8.8      20 Apr 2018 01:32    TPro  6.3  /  Alb  3.9  /  TBili  <0.2  /  DBili  x   /  AST  16  /  ALT  15  /  AlkPhos  54  04-20    PT/INR - ( 20 Apr 2018 01:32 )   PT: 11.6 sec;   INR: 1.04          PTT - ( 20 Apr 2018 01:32 )  PTT:28.4 sec      RADIOLOGY & ADDITIONAL STUDIES: HPI:  28yr old M with PMHx significant for small bowel resection x 2 (as a child for ileo-cecal intussusception; distal SBR in 2013 in [place] while admitted to SICU for GI bleed), recent admission for lower GI bleed (2/2018, that required pRBC transfusions, underwent EGD with push to D4 that was unrevealing and colonoscopy that showed multiple ulcers at prior ileo-cecal anastomosis (sp hemoclip and biopsies which showed active chronic enteritis), chronic diarrhea likely 2/2 short gut currently on cholestyramine who presents for evaluation of GI bleed.  According to patient he had a large rectal bleed last nite with dark red blood per rectum, and on presentation to the Ed had 2 more episodes, with minimal stool. He denies any abdominal pain/distention, fever, chills, n/v, dysphagia, odynophagia, weight loss, lightheadedness, dizziness, falls/LOC.    EGD - 2/17/18 - with push to D4, normal  Colonoscopy - 2/17/18 - which showed old heme, anastomotic ulcers at ileo-cecal anastomosis, sp hemoclip and biopsy of one ulcer      PAST MEDICAL & SURGICAL HISTORY:  Stomach ulcer  Irritable bowel syndrome with diarrhea  GI bleeding  History of bowel resection: SMALL BOWEL      REVIEW OF SYSTEMS  Apart from items noted in the HPI a 10point ROS was negative  	    MEDICATIONS  (STANDING):  sodium chloride 0.9%. 1000 milliLiter(s) (125 mL/Hr) IV Continuous <Continuous>    MEDICATIONS  (PRN):      Allergies  No Known Allergies    Intolerances      SOCIAL HISTORY:    FAMILY HISTORY:  Family history of gastric cancer (Mother)  Family history of Meckel's diverticulum (Father)      Vital Signs Last 24 Hrs  T(C): 36.6 (20 Apr 2018 05:30), Max: 36.6 (20 Apr 2018 01:00)  T(F): 97.8 (20 Apr 2018 05:30), Max: 97.9 (20 Apr 2018 01:00)  HR: 85 (20 Apr 2018 05:30) (85 - 93)  BP: 116/73 (20 Apr 2018 05:30) (116/73 - 144/92)  BP(mean): --  RR: 17 (20 Apr 2018 05:30) (17 - 18)  SpO2: 99% (20 Apr 2018 05:30) (99% - 100%)    PHYSICAL EXAM:      Constitutional:    Eyes:    ENMT:    Neck:    Breasts:    Back:    Respiratory:    Cardiovascular:    Gastrointestinal:    Genitourinary:    Rectal:    Extremities:    Vascular:    Neurological:    Skin:    Lymph Nodes:    Musculoskeletal:    Psychiatric:        LABS:                        8.7    6.3   )-----------( 223      ( 20 Apr 2018 12:52 )             28.0     04-20    133<L>  |  100  |  15  ----------------------------<  104<H>  3.9   |  23  |  1.03    Ca    8.8      20 Apr 2018 01:32    TPro  6.3  /  Alb  3.9  /  TBili  <0.2  /  DBili  x   /  AST  16  /  ALT  15  /  AlkPhos  54  04-20    PT/INR - ( 20 Apr 2018 01:32 )   PT: 11.6 sec;   INR: 1.04          PTT - ( 20 Apr 2018 01:32 )  PTT:28.4 sec          RADIOLOGY & ADDITIONAL STUDIES:  CT Abdomen and Pelvis w/ Oral Cont and w/ IV Cont (02.20.18 @ 18:56)   FINDINGS:    The liver is normal in appearance.  No radiopaque stones are seen in the gallbladder.  The pancreas is normal in appearance.  No splenic abnormalities are seen.    The adrenal glands are unremarkable. The kidneys are normal in appearance.        No abdominal aortic aneurysm is seen. Mesenteric lymph nodes are mildly prominent in the right lower quadrant. No retroperitoneal lymphadenopathy is seen.     Evaluation of the bowel demonstrates evidence of prior intestinal resection. There is a small bowel anastomosis in the left mid abdomen. Another small bowel anastomotic loop is noted in the right lower quadrant. There is evidence of resection and anastomosis at the ileocolic junction now in the right upper quadrant. Mild diffuse colonic wall thickening is which is underdistended. This is most conspicuous in the proximal sigmoid where prominent vasa recta are seen. No discrete intestinal mass is identified. There is no bowel obstruction or free air. No ascites is seen.    Images of the pelvis demonstrate no bladder abnormality prostate is not enlarged.    Evaluation of the osseous structures demonstrates no significant abnormality. Small bone island is noted in the left femoral head.      IMPRESSION:  Multiple small bowel resections as described above.    Mild diffuse colonic wall thickening could be due to underdistention or colitis.    Mildly prominent mesenteric lymph nodes in the right lower quadrant. No retroperitoneal lymphadenopathy.      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

## 2018-04-20 NOTE — H&P ADULT - HISTORY OF PRESENT ILLNESS
27 yo male with hx of small bowel resection x 2 (as a child for ileo-cecal intussusception/ and distal SBR in 2013 in [place] while admitted to SICU for GI bleed), recent admission for lower GI bleed (2/2018 under care of Dr. Garcia), required pRBC transfusions, underwent EGD and colonoscopy that showed multiple ulcers at prior ileo-cecal anastomosis (biopsies showed active chronic enteritis), discharged home in stable condition with plan for outpatient CT enterography (not yet performed), today presents with 1 episode of large dark red blood per rectum with only scant amount of stool. Another episode in the ED. Denies nausea, emesis, abdominal pain, recent diarrhea or constipation, lightheadedenss or dizziness. has no other complaints.    Hemodynamically stable, afebrile. Hgb 9.5 (at discharge 8.7)

## 2018-04-20 NOTE — ED PROVIDER NOTE - OBJECTIVE STATEMENT
28M hx bowel resection in past,  c/o BRBPR.  pt states had large bloody bowel movement tonight.  states was recently admitted in february for same.  pt had endoscopy and colonoscopy, found to have ulcer. required transfusion during previous admission.  no abd pain. no dizziness. no fevers. no n/v.

## 2018-04-20 NOTE — ED ADULT NURSE NOTE - OBJECTIVE STATEMENT
Pt states " I started having dark bloody stool x a couple hours ago. I have a hx of GI bleed/Ulcers." Denies pain, n/v/d

## 2018-04-20 NOTE — ED ADULT NURSE REASSESSMENT NOTE - NS ED NURSE REASSESS COMMENT FT1
Pt remains in stable condition. No episode of bleeding noted in ER. Pt resting w/o distress. Close monitoring continues.

## 2018-04-20 NOTE — H&P ADULT - NSHPPHYSICALEXAM_GEN_ALL_CORE
Resting in bed, in no distress  Normocardic, normotensive  CTAB, non-labored breathing on RA  Abdomen soft, non-distended, non-tender throughout, no rebound or guarding  Rectal exam: scant amount of bright red blood mixed with mucous, no palpable masses

## 2018-04-20 NOTE — H&P ADULT - NSHPLABSRESULTS_GEN_ALL_CORE
Comprehensive Metabolic Panel (04.20.18 @ 01:32)    Sodium, Serum: 133 mmol/L    Potassium, Serum: 3.9 mmol/L    Chloride, Serum: 100 mmol/L    Carbon Dioxide, Serum: 23 mmol/L    Anion Gap, Serum: 10 mmol/L    Blood Urea Nitrogen, Serum: 15 mg/dL    Creatinine, Serum: 1.03 mg/dL    Glucose, Serum: 104 mg/dL    Calcium, Total Serum: 8.8 mg/dL    Protein Total, Serum: 6.3 g/dL    Albumin, Serum: 3.9 g/dL    Bilirubin Total, Serum: <0.2 mg/dL    Alkaline Phosphatase, Serum: 54 U/L    Aspartate Aminotransferase (AST/SGOT): 16 U/L    Alanine Aminotransferase (ALT/SGPT): 15 U/L    Complete Blood Count + Automated Diff (04.20.18 @ 01:32)    WBC Count: 12.1 K/uL    RBC Count: 4.32 M/uL    Hemoglobin: 9.5 g/dL    Hematocrit: 31.2 %    Mean Cell Volume: 72.2 fL    Mean Cell Hemoglobin: 22.0 pg    Mean Cell Hemoglobin Conc: 30.4 g/dL    Red Cell Distrib Width: 16.5 %    Platelet Count - Automated: 280 K/uL    Prothrombin Time and INR, Plasma in AM (04.20.18 @ 01:32)    Prothrombin Time, Plasma: 11.6: Effective March 21st, the reference range for PT has changed. sec    INR: 1.04: An INR within the range of 2.00 to 3.00 is recommended for patients with  deep vein thrombosis, pulmonary embolism, atrial fibrillation and tissue  valves.  An INR within the range of 2.50 to 3.50 is recommended for most patients  with artificial valves.

## 2018-04-20 NOTE — CONSULT NOTE ADULT - ASSESSMENT
28yr old M with PMHx significant for small bowel resection x 2 (as a child for ileo-cecal intussusception; distal SBR in 2013 in [place] while admitted to SICU for GI bleed), recent admission for lower GI bleed (2/2018, that required pRBC transfusions, underwent EGD with push to D4 that was unrevealing and colonoscopy that showed multiple ulcers at prior ileo-cecal anastomosis (sp hemoclip and biopsies which showed active chronic enteritis), chronic diarrhea likely 2/2 short gut currently on cholestyramine who presents for evaluation of GI bleed.    # GI Bleed -   - likely patient is bleeding from the ileo-cecal anastomosis ulcers seen on last colonoscopy  - his last biopsies are suggestive of inflammatory bowel disease  - please get ESR, CRP, fecal calprotectin, c-ANCA, p-ANCA, ASCA  - will review CT Abd/pelvis with radiology to determine if there is need for a CTE  - probably needs revision of his anastomosis site given ulcerations seen on last scope    # Chronic diarrhea-   - improved on cholestyramine  - continue and monitor bowel movements      Will discuss with attending Dr Hansen  GI following

## 2018-04-20 NOTE — ED ADULT NURSE NOTE - CHPI ED SYMPTOMS NEG
no abdominal distension/no diarrhea/no vomiting/no hematuria/no dysuria/no fever/no nausea/no chills

## 2018-04-21 ENCOUNTER — TRANSCRIPTION ENCOUNTER (OUTPATIENT)
Age: 28
End: 2018-04-21

## 2018-04-21 VITALS
HEART RATE: 66 BPM | SYSTOLIC BLOOD PRESSURE: 115 MMHG | RESPIRATION RATE: 16 BRPM | OXYGEN SATURATION: 100 % | TEMPERATURE: 98 F | DIASTOLIC BLOOD PRESSURE: 73 MMHG

## 2018-04-21 LAB
ANION GAP SERPL CALC-SCNC: 7 MMOL/L — SIGNIFICANT CHANGE UP (ref 5–17)
BUN SERPL-MCNC: 11 MG/DL — SIGNIFICANT CHANGE UP (ref 7–23)
CALCIUM SERPL-MCNC: 8.1 MG/DL — LOW (ref 8.4–10.5)
CHLORIDE SERPL-SCNC: 108 MMOL/L — SIGNIFICANT CHANGE UP (ref 96–108)
CO2 SERPL-SCNC: 24 MMOL/L — SIGNIFICANT CHANGE UP (ref 22–31)
CREAT SERPL-MCNC: 1.01 MG/DL — SIGNIFICANT CHANGE UP (ref 0.5–1.3)
ERYTHROCYTE [SEDIMENTATION RATE] IN BLOOD: 2 MM/HR — SIGNIFICANT CHANGE UP
GLUCOSE SERPL-MCNC: 93 MG/DL — SIGNIFICANT CHANGE UP (ref 70–99)
HCT VFR BLD CALC: 25.7 % — LOW (ref 39–50)
HCT VFR BLD CALC: 26.6 % — LOW (ref 39–50)
HCT VFR BLD CALC: 26.7 % — LOW (ref 39–50)
HCT VFR BLD CALC: 27.7 % — LOW (ref 39–50)
HGB BLD-MCNC: 7.7 G/DL — LOW (ref 13–17)
HGB BLD-MCNC: 7.9 G/DL — LOW (ref 13–17)
HGB BLD-MCNC: 8 G/DL — LOW (ref 13–17)
HGB BLD-MCNC: 8.3 G/DL — LOW (ref 13–17)
MAGNESIUM SERPL-MCNC: 1.7 MG/DL — SIGNIFICANT CHANGE UP (ref 1.6–2.6)
MCHC RBC-ENTMCNC: 21.8 PG — LOW (ref 27–34)
MCHC RBC-ENTMCNC: 22 PG — LOW (ref 27–34)
MCHC RBC-ENTMCNC: 29.7 G/DL — LOW (ref 32–36)
MCHC RBC-ENTMCNC: 30 G/DL — LOW (ref 32–36)
MCV RBC AUTO: 72.8 FL — LOW (ref 80–100)
MCV RBC AUTO: 72.9 FL — LOW (ref 80–100)
MCV RBC AUTO: 73.4 FL — LOW (ref 80–100)
MCV RBC AUTO: 73.5 FL — LOW (ref 80–100)
PHOSPHATE SERPL-MCNC: 3.5 MG/DL — SIGNIFICANT CHANGE UP (ref 2.5–4.5)
PLATELET # BLD AUTO: 198 K/UL — SIGNIFICANT CHANGE UP (ref 150–400)
PLATELET # BLD AUTO: 211 K/UL — SIGNIFICANT CHANGE UP (ref 150–400)
PLATELET # BLD AUTO: 219 K/UL — SIGNIFICANT CHANGE UP (ref 150–400)
PLATELET # BLD AUTO: 231 K/UL — SIGNIFICANT CHANGE UP (ref 150–400)
POTASSIUM SERPL-MCNC: 3.9 MMOL/L — SIGNIFICANT CHANGE UP (ref 3.5–5.3)
POTASSIUM SERPL-SCNC: 3.9 MMOL/L — SIGNIFICANT CHANGE UP (ref 3.5–5.3)
RBC # BLD: 3.5 M/UL — LOW (ref 4.2–5.8)
RBC # BLD: 3.62 M/UL — LOW (ref 4.2–5.8)
RBC # BLD: 3.67 M/UL — LOW (ref 4.2–5.8)
RBC # BLD: 3.8 M/UL — LOW (ref 4.2–5.8)
RBC # FLD: 16.4 % — SIGNIFICANT CHANGE UP (ref 10.3–16.9)
RBC # FLD: 16.4 % — SIGNIFICANT CHANGE UP (ref 10.3–16.9)
RBC # FLD: 16.5 % — SIGNIFICANT CHANGE UP (ref 10.3–16.9)
RBC # FLD: 16.7 % — SIGNIFICANT CHANGE UP (ref 10.3–16.9)
SODIUM SERPL-SCNC: 139 MMOL/L — SIGNIFICANT CHANGE UP (ref 135–145)
WBC # BLD: 3.9 K/UL — SIGNIFICANT CHANGE UP (ref 3.8–10.5)
WBC # BLD: 4.3 K/UL — SIGNIFICANT CHANGE UP (ref 3.8–10.5)
WBC # BLD: 4.5 K/UL — SIGNIFICANT CHANGE UP (ref 3.8–10.5)
WBC # BLD: 5.9 K/UL — SIGNIFICANT CHANGE UP (ref 3.8–10.5)
WBC # FLD AUTO: 3.9 K/UL — SIGNIFICANT CHANGE UP (ref 3.8–10.5)
WBC # FLD AUTO: 4.3 K/UL — SIGNIFICANT CHANGE UP (ref 3.8–10.5)
WBC # FLD AUTO: 4.5 K/UL — SIGNIFICANT CHANGE UP (ref 3.8–10.5)
WBC # FLD AUTO: 5.9 K/UL — SIGNIFICANT CHANGE UP (ref 3.8–10.5)

## 2018-04-21 PROCEDURE — 86140 C-REACTIVE PROTEIN: CPT

## 2018-04-21 PROCEDURE — 86900 BLOOD TYPING SEROLOGIC ABO: CPT

## 2018-04-21 PROCEDURE — 86901 BLOOD TYPING SEROLOGIC RH(D): CPT

## 2018-04-21 PROCEDURE — 85610 PROTHROMBIN TIME: CPT

## 2018-04-21 PROCEDURE — 36415 COLL VENOUS BLD VENIPUNCTURE: CPT

## 2018-04-21 PROCEDURE — 85025 COMPLETE CBC W/AUTO DIFF WBC: CPT

## 2018-04-21 PROCEDURE — 83605 ASSAY OF LACTIC ACID: CPT

## 2018-04-21 PROCEDURE — 85652 RBC SED RATE AUTOMATED: CPT

## 2018-04-21 PROCEDURE — 80053 COMPREHEN METABOLIC PANEL: CPT

## 2018-04-21 PROCEDURE — 84100 ASSAY OF PHOSPHORUS: CPT

## 2018-04-21 PROCEDURE — 86850 RBC ANTIBODY SCREEN: CPT

## 2018-04-21 PROCEDURE — 85730 THROMBOPLASTIN TIME PARTIAL: CPT

## 2018-04-21 PROCEDURE — 83520 IMMUNOASSAY QUANT NOS NONAB: CPT

## 2018-04-21 PROCEDURE — 99285 EMERGENCY DEPT VISIT HI MDM: CPT | Mod: 25

## 2018-04-21 PROCEDURE — 83993 ASSAY FOR CALPROTECTIN FECAL: CPT

## 2018-04-21 PROCEDURE — 85027 COMPLETE CBC AUTOMATED: CPT

## 2018-04-21 PROCEDURE — 80048 BASIC METABOLIC PNL TOTAL CA: CPT

## 2018-04-21 PROCEDURE — 83735 ASSAY OF MAGNESIUM: CPT

## 2018-04-21 RX ORDER — MAGNESIUM SULFATE 500 MG/ML
2 VIAL (ML) INJECTION ONCE
Qty: 0 | Refills: 0 | Status: COMPLETED | OUTPATIENT
Start: 2018-04-21 | End: 2018-04-21

## 2018-04-21 RX ADMIN — SODIUM CHLORIDE 125 MILLILITER(S): 9 INJECTION INTRAMUSCULAR; INTRAVENOUS; SUBCUTANEOUS at 06:26

## 2018-04-21 RX ADMIN — Medication 50 GRAM(S): at 12:28

## 2018-04-21 NOTE — DISCHARGE NOTE ADULT - CARE PLAN
Principal Discharge DX:	Gastrointestinal hemorrhage with melena  Goal:	Recovery  Assessment and plan of treatment:	Please follow up with  (contact information will be attached below) next week and  next week. Call both of their office to confirm the appointment.  Get enough rest and fluids.   No heavy lifting or exercise for 1-2 weeks.   If you notice any episode of bleeding again, nausea/vomiting, abdominal pain, chest pain, shortness of breath, please come to the nearest hospital.

## 2018-04-21 NOTE — PROGRESS NOTE ADULT - SUBJECTIVE AND OBJECTIVE BOX
O/N: GI does not plan to scope this weekend unless bleeding tonight. CBC q8 as per GI, CLD, NPO @ [facility] if bleeding then colonoscopy tomorrow in am if no bleeding will follow up out pt  4/20: New admit for LGIB, CBC stable so far. not for colonoscopy as per GI, possible tomorrow O/N: GI does not plan to scope this weekend unless bleeding tonight. CBC q8 as per GI, CLD, NPO @ [facility] if bleeding then colonoscopy tomorrow in am if no bleeding will follow up out pt  4/20: New admit for LGIB, CBC stable so far. not for colonoscopy as per GI, possible tomorrow    SUBJECTIVE: Patient seen and examined bedside by chief resident. no nausea/vomiting, no more bloody BM, no dizziness, no abd pain    Vital Signs Last 24 Hrs  T(C): 36 (21 Apr 2018 05:34), Max: 36.9 (20 Apr 2018 16:30)  T(F): 96.8 (21 Apr 2018 05:34), Max: 98.4 (20 Apr 2018 16:30)  HR: 68 (21 Apr 2018 05:34) (68 - 78)  BP: 107/65 (21 Apr 2018 05:34) (107/65 - 128/83)  BP(mean): 87 (20 Apr 2018 16:30) (87 - 87)  RR: 17 (21 Apr 2018 05:34) (16 - 17)  SpO2: 100% (21 Apr 2018 05:34) (99% - 100%)  I&O's Detail    20 Apr 2018 07:01  -  21 Apr 2018 07:00  --------------------------------------------------------  IN:    sodium chloride 0.9%.: 1250 mL  Total IN: 1250 mL    OUT:    Voided: 1200 mL  Total OUT: 1200 mL    Total NET: 50 mL          General: NAD, resting comfortably in bed  C/V: NSR  Pulm: Nonlabored breathing, no respiratory distress  Abd: soft, NT/ND.  Extrem: WWP, no edema, SCDs in place        LABS:                        8.0    5.9   )-----------( 211      ( 21 Apr 2018 03:13 )             26.7     04-21    139  |  108  |  11  ----------------------------<  93  3.9   |  24  |  1.01    Ca    8.1<L>      21 Apr 2018 08:55  Phos  3.5     04-21  Mg     1.7     04-21    TPro  6.3  /  Alb  3.9  /  TBili  <0.2  /  DBili  x   /  AST  16  /  ALT  15  /  AlkPhos  54  04-20    PT/INR - ( 20 Apr 2018 01:32 )   PT: 11.6 sec;   INR: 1.04          PTT - ( 20 Apr 2018 01:32 )  PTT:28.4 sec      RADIOLOGY & ADDITIONAL STUDIES:

## 2018-04-21 NOTE — DISCHARGE NOTE ADULT - CARE PROVIDER_API CALL
Sebas Garcia), Surgery  [address] [address]  Phone: [phone]  Fax: [phone]    Harris Gallego), Gastroenterology  [address]  [address] [place] [address]  Phone: [phone]  Fax: [phone]

## 2018-04-21 NOTE — PROGRESS NOTE ADULT - SUBJECTIVE AND OBJECTIVE BOX
Pt seen and examined at bedside  No new c/o  Reports that he feels much better  Denies any further episodes of bleeding  Denies n/v/d, tolerating clears      MEDICATIONS:  MEDICATIONS  (STANDING):  sodium chloride 0.9%. 1000 milliLiter(s) (125 mL/Hr) IV Continuous <Continuous>    MEDICATIONS  (PRN):      Allergies  No Known Allergies    Intolerances        Vital Signs Last 24 Hrs  T(C): 36.9 (21 Apr 2018 16:59), Max: 36.9 (21 Apr 2018 14:11)  T(F): 98.4 (21 Apr 2018 16:59), Max: 98.4 (21 Apr 2018 14:11)  HR: 66 (21 Apr 2018 16:59) (66 - 68)  BP: 115/73 (21 Apr 2018 16:59) (107/65 - 115/73)  BP(mean): --  RR: 16 (21 Apr 2018 16:59) (16 - 17)  SpO2: 100% (21 Apr 2018 16:59) (100% - 100%)    04-20 @ 07:01 - 04-21 @ 07:00  --------------------------------------------------------  IN: 1250 mL / OUT: 1200 mL / NET: 50 mL    04-21 @ 07:01  - 04-21 @ 22:58  --------------------------------------------------------  IN: 680 mL / OUT: 0 mL / NET: 680 mL        PHYSICAL EXAM:    General: Well developed; well nourished; in no acute distress  HEENT: MMM, conjunctiva and sclera clear  Gastrointestinal: Soft non-tender non-distended; Normal bowel sounds; No hepatosplenomegaly  Skin: Warm and dry. No obvious rash    LABS:      CBC Full  -  ( 21 Apr 2018 16:24 )  WBC Count : 4.5 K/uL  Hemoglobin : 8.3 g/dL  Hematocrit : 27.7 %  Platelet Count - Automated : 231 K/uL  Mean Cell Volume : 72.9 fL  Mean Cell Hemoglobin : 21.8 pg  Mean Cell Hemoglobin Concentration : 30.0 g/dL  Auto Neutrophil # : x  Auto Lymphocyte # : x  Auto Monocyte # : x  Auto Eosinophil # : x  Auto Basophil # : x  Auto Neutrophil % : x  Auto Lymphocyte % : x  Auto Monocyte % : x  Auto Eosinophil % : x  Auto Basophil % : x    04-21    139  |  108  |  11  ----------------------------<  93  3.9   |  24  |  1.01    Ca    8.1<L>      21 Apr 2018 08:55  Phos  3.5     04-21  Mg     1.7     04-21    TPro  6.3  /  Alb  3.9  /  TBili  <0.2  /  DBili  x   /  AST  16  /  ALT  15  /  AlkPhos  54  04-20    PT/INR - ( 20 Apr 2018 01:32 )   PT: 11.6 sec;   INR: 1.04       PTT - ( 20 Apr 2018 01:32 )  PTT:28.4 sec          RADIOLOGY & ADDITIONAL STUDIES (The following images were personally reviewed):

## 2018-04-21 NOTE — PROGRESS NOTE ADULT - ATTENDING COMMENTS
LATE ENTRY.  H&H is stable. no hemodynamic instability. No more blood per rectum.  GI opinion noted.  Pt is insisting on discharge today.  Will check one other set of H&H. if unchanged, ok to D/C.  Follow up with me and Dr. Gallego (GI) in 1 week.  Will need GI work up as outpatient.

## 2018-04-21 NOTE — DISCHARGE NOTE ADULT - PATIENT PORTAL LINK FT
You can access the [facility] Patient Portal, offered by [facility], by registering with the following website: [email or web address]

## 2018-04-21 NOTE — PROGRESS NOTE ADULT - ASSESSMENT
28yr old M with PMHx significant for small bowel resection x 2 (as a child for ileo-cecal intussusception; distal SBR in 2013 in [place] while admitted to SICU for GI bleed), recent admission for lower GI bleed (2/2018, that required pRBC transfusions, underwent EGD with push to D4 that was unrevealing and colonoscopy that showed multiple ulcers at prior ileo-cecal anastomosis (sp hemoclip and biopsies which showed active chronic enteritis), chronic diarrhea likely 2/2 short gut currently on cholestyramine who presents for evaluation of GI bleed.    # GI Bleed -   - likely patient is bleeding from the ileo-cecal anastomosis ulcers seen on last colonoscopy, but could fito have other areas of bleeding if he has IBD  - his last biopsies are suggestive of inflammatory bowel disease  - please get ESR, CRP, fecal calprotectin, c-ANCA, p-ANCA, ASCA  - will plan for VCE as an outpatient and if positive will proceed to DBE      # Chronic diarrhea-   - improved on cholestyramine  - continue and monitor bowel movements      Discussed with attendings Dr Hansen/Dr Gallego  GI following    We will plan for outpatient VCE as patient was insisting on dc today, and he should follow up with Dr Gallego in the clinic in a few weeks to discuss with findings and further steps

## 2018-04-21 NOTE — DISCHARGE NOTE ADULT - HOSPITAL COURSE
This is a 29 yo male with hx of small bowel resection x 2 (as a child for ileo-cecal intussusception/ and distal SBR in 2013 in [place] while admitted to SICU for GI bleed), recent admission for lower GI bleed (2/2018 under care of Dr. Garcia), required pRBC transfusions, underwent EGD and colonoscopy that showed multiple ulcers at prior ileo-cecal anastomosis (biopsies showed active chronic enteritis), discharged home in stable condition with plan for outpatient CT enterography (not yet performed),presented again to [facility] on 4/19 for 1 episode of large dark red blood per rectum with only scant amount of stool. No blood transfusion was needed, patient's CBC was carefully monitored every 6 hours and it was stable. Upon discharge, patient's hemoglobin was 8.3, vital signs were stable and he was clinically well.    Patient was discharged with instructions to follow up  and  in 1 week.

## 2018-04-21 NOTE — DISCHARGE NOTE ADULT - PLAN OF CARE
Recovery Please follow up with  (contact information will be attached below) next week and  next week. Call both of their office to confirm the appointment.  Get enough rest and fluids.   No heavy lifting or exercise for 1-2 weeks.   If you notice any episode of bleeding again, nausea/vomiting, abdominal pain, chest pain, shortness of breath, please come to the nearest hospital.

## 2018-04-21 NOTE — PROGRESS NOTE ADULT - ASSESSMENT
29 yo male with recurrent GI bleed.     Problem 1 : recurrent GI bleed 2/2 ulcers at ileo-cecal anastomosis site   - NPO/IVF hydration   - F/up GI recs - not going to scope  - CBC Q6  - ESR, CRP, c-ANCA, p-ANCA, ASCA  - SCD  - OOB 29 yo male with recurrent GI bleed.     Problem 1 : recurrent GI bleed 2/2 ulcers at ileo-cecal anastomosis site  - follow up AM CBC  - Fup GI recs  - cont to monitor any bloody BM  - DC if stable and see GI for colonoscopy as an outpatient

## 2018-04-22 LAB
AUTO DIFF PNL BLD: NEGATIVE — SIGNIFICANT CHANGE UP
C-ANCA SER-ACNC: NEGATIVE — SIGNIFICANT CHANGE UP
P-ANCA SER-ACNC: NEGATIVE — SIGNIFICANT CHANGE UP

## 2018-04-23 LAB
BAKER'S YEAST IGA QN IA: 9.9 UNITS — SIGNIFICANT CHANGE UP
BAKER'S YEAST IGA QN IA: NEGATIVE — SIGNIFICANT CHANGE UP
BAKER'S YEAST IGG QN IA: 8.9 UNITS — SIGNIFICANT CHANGE UP
BAKER'S YEAST IGG QN IA: NEGATIVE — SIGNIFICANT CHANGE UP

## 2018-04-25 DIAGNOSIS — K92.2 GASTROINTESTINAL HEMORRHAGE, UNSPECIFIED: ICD-10-CM

## 2018-04-25 DIAGNOSIS — K58.0 IRRITABLE BOWEL SYNDROME WITH DIARRHEA: ICD-10-CM

## 2018-04-25 DIAGNOSIS — K90.9 INTESTINAL MALABSORPTION, UNSPECIFIED: ICD-10-CM

## 2018-04-25 DIAGNOSIS — K62.89 OTHER SPECIFIED DISEASES OF ANUS AND RECTUM: ICD-10-CM

## 2018-04-26 LAB — CALPROTECTIN STL-MCNT: 429 UG/G — HIGH (ref 0–120)

## 2018-04-30 ENCOUNTER — OUTPATIENT (OUTPATIENT)
Dept: OUTPATIENT SERVICES | Facility: HOSPITAL | Age: 28
LOS: 1 days | End: 2018-04-30
Payer: COMMERCIAL

## 2018-04-30 ENCOUNTER — APPOINTMENT (OUTPATIENT)
Dept: GASTROENTEROLOGY | Facility: HOSPITAL | Age: 28
End: 2018-04-30

## 2018-04-30 DIAGNOSIS — R93.3 ABNORMAL FINDINGS ON DIAGNOSTIC IMAGING OF OTHER PARTS OF DIGESTIVE TRACT: ICD-10-CM

## 2018-04-30 DIAGNOSIS — Z98.890 OTHER SPECIFIED POSTPROCEDURAL STATES: Chronic | ICD-10-CM

## 2018-04-30 PROCEDURE — 91110 GI TRC IMG INTRAL ESOPH-ILE: CPT

## 2018-04-30 PROCEDURE — 91110 GI TRC IMG INTRAL ESOPH-ILE: CPT | Mod: 26

## 2018-05-16 ENCOUNTER — TRANSCRIPTION ENCOUNTER (OUTPATIENT)
Age: 28
End: 2018-05-16

## 2018-05-23 ENCOUNTER — LABORATORY RESULT (OUTPATIENT)
Age: 28
End: 2018-05-23

## 2018-05-23 ENCOUNTER — APPOINTMENT (OUTPATIENT)
Dept: GASTROENTEROLOGY | Facility: CLINIC | Age: 28
End: 2018-05-23
Payer: COMMERCIAL

## 2018-05-23 VITALS
DIASTOLIC BLOOD PRESSURE: 72 MMHG | HEIGHT: 71 IN | HEART RATE: 80 BPM | SYSTOLIC BLOOD PRESSURE: 110 MMHG | WEIGHT: 163 LBS | OXYGEN SATURATION: 100 % | TEMPERATURE: 98 F | BODY MASS INDEX: 22.82 KG/M2 | RESPIRATION RATE: 14 BRPM

## 2018-05-23 PROCEDURE — 36415 COLL VENOUS BLD VENIPUNCTURE: CPT

## 2018-05-23 PROCEDURE — 99367 TEAM CONF W/O PAT BY PHYS: CPT | Mod: 25

## 2018-05-24 ENCOUNTER — MOBILE ON CALL (OUTPATIENT)
Age: 28
End: 2018-05-24

## 2018-05-30 LAB
ADJUSTED MITOGEN: 8.58 IU/ML
ADJUSTED TB AG: 0 IU/ML
ALBUMIN SERPL ELPH-MCNC: 4.1 G/DL
ALP BLD-CCNC: 53 U/L
ALT SERPL-CCNC: 11 U/L
ANION GAP SERPL CALC-SCNC: 12 MMOL/L
AST SERPL-CCNC: 22 U/L
BASOPHILS # BLD AUTO: 0.05 K/UL
BASOPHILS NFR BLD AUTO: 0.9 %
BILIRUB SERPL-MCNC: 0.4 MG/DL
BUN SERPL-MCNC: 13 MG/DL
CALCIUM SERPL-MCNC: 9.4 MG/DL
CHLORIDE SERPL-SCNC: 105 MMOL/L
CO2 SERPL-SCNC: 24 MMOL/L
CREAT SERPL-MCNC: 1.17 MG/DL
CRP SERPL-MCNC: <0.2 MG/DL
EOSINOPHIL # BLD AUTO: 0.05 K/UL
EOSINOPHIL NFR BLD AUTO: 0.9 %
FERRITIN SERPL-MCNC: 4 NG/ML
GLUCOSE SERPL-MCNC: 85 MG/DL
HBV CORE IGG+IGM SER QL: NONREACTIVE
HBV CORE IGM SER QL: NONREACTIVE
HBV SURFACE AB SER QL: REACTIVE
HBV SURFACE AG SER QL: NONREACTIVE
HCT VFR BLD CALC: 30.5 %
HGB BLD-MCNC: 8.4 G/DL
IRON SATN MFR SERPL: 2 %
IRON SERPL-MCNC: 12 UG/DL
LYMPHOCYTES # BLD AUTO: 1.98 K/UL
LYMPHOCYTES NFR BLD AUTO: 39.1 %
M TB IFN-G BLD-IMP: NEGATIVE
MAN DIFF?: NORMAL
MCHC RBC-ENTMCNC: 18.5 PG
MCHC RBC-ENTMCNC: 27.5 GM/DL
MCV RBC AUTO: 67.3 FL
MONOCYTES # BLD AUTO: 0.28 K/UL
MONOCYTES NFR BLD AUTO: 5.5 %
NEUTROPHILS # BLD AUTO: 2.72 K/UL
NEUTROPHILS NFR BLD AUTO: 53.6 %
PLATELET # BLD AUTO: 279 K/UL
POTASSIUM SERPL-SCNC: 4.7 MMOL/L
PROT SERPL-MCNC: 7.3 G/DL
QUANTIFERON GOLD NIL: 0.03 IU/ML
RBC # BLD: 4.53 M/UL
RBC # FLD: 17.8 %
SODIUM SERPL-SCNC: 141 MMOL/L
TIBC SERPL-MCNC: 511 UG/DL
TRANSFERRIN SERPL-MCNC: 403 MG/DL
UIBC SERPL-MCNC: 499 UG/DL
VIT B12 SERPL-MCNC: 186 PG/ML
WBC # FLD AUTO: 5.07 K/UL

## 2018-06-07 ENCOUNTER — OTHER (OUTPATIENT)
Age: 28
End: 2018-06-07

## 2018-06-08 ENCOUNTER — MOBILE ON CALL (OUTPATIENT)
Age: 28
End: 2018-06-08

## 2018-06-13 ENCOUNTER — APPOINTMENT (OUTPATIENT)
Dept: GASTROENTEROLOGY | Facility: CLINIC | Age: 28
End: 2018-06-13
Payer: COMMERCIAL

## 2018-06-13 VITALS
WEIGHT: 164 LBS | HEIGHT: 71 IN | DIASTOLIC BLOOD PRESSURE: 90 MMHG | RESPIRATION RATE: 14 BRPM | SYSTOLIC BLOOD PRESSURE: 140 MMHG | OXYGEN SATURATION: 99 % | BODY MASS INDEX: 22.96 KG/M2 | HEART RATE: 76 BPM | TEMPERATURE: 98 F

## 2018-06-13 PROCEDURE — 99214 OFFICE O/P EST MOD 30 MIN: CPT | Mod: GC

## 2018-06-15 RX ORDER — DIPHENHYDRAMINE HCL 50 MG
50 CAPSULE ORAL ONCE
Qty: 0 | Refills: 0 | Status: COMPLETED | OUTPATIENT
Start: 2018-06-18 | End: 2018-06-18

## 2018-06-15 RX ORDER — ACETAMINOPHEN 500 MG
650 TABLET ORAL ONCE
Qty: 0 | Refills: 0 | Status: COMPLETED | OUTPATIENT
Start: 2018-06-18 | End: 2018-06-18

## 2018-06-15 RX ORDER — INFLIXIMAB-DYYB 120 MG/ML
400 INJECTION SUBCUTANEOUS ONCE
Qty: 0 | Refills: 0 | Status: COMPLETED | OUTPATIENT
Start: 2018-06-18 | End: 2018-06-18

## 2018-06-18 ENCOUNTER — APPOINTMENT (OUTPATIENT)
Dept: INFUSION THERAPY | Facility: HOSPITAL | Age: 28
End: 2018-06-18

## 2018-06-18 ENCOUNTER — OUTPATIENT (OUTPATIENT)
Dept: OUTPATIENT SERVICES | Facility: HOSPITAL | Age: 28
LOS: 1 days | End: 2018-06-18
Payer: COMMERCIAL

## 2018-06-18 VITALS
HEART RATE: 76 BPM | SYSTOLIC BLOOD PRESSURE: 116 MMHG | OXYGEN SATURATION: 100 % | RESPIRATION RATE: 18 BRPM | TEMPERATURE: 97 F | DIASTOLIC BLOOD PRESSURE: 63 MMHG

## 2018-06-18 VITALS
RESPIRATION RATE: 18 BRPM | TEMPERATURE: 98 F | SYSTOLIC BLOOD PRESSURE: 110 MMHG | HEART RATE: 70 BPM | DIASTOLIC BLOOD PRESSURE: 70 MMHG | OXYGEN SATURATION: 99 %

## 2018-06-18 DIAGNOSIS — K50.90 CROHN'S DISEASE, UNSPECIFIED, WITHOUT COMPLICATIONS: ICD-10-CM

## 2018-06-18 DIAGNOSIS — Z98.890 OTHER SPECIFIED POSTPROCEDURAL STATES: Chronic | ICD-10-CM

## 2018-06-18 LAB
ALBUMIN SERPL ELPH-MCNC: 3.9 G/DL — SIGNIFICANT CHANGE UP (ref 3.3–5)
ALP SERPL-CCNC: 57 U/L — SIGNIFICANT CHANGE UP (ref 40–120)
ALT FLD-CCNC: 13 U/L — SIGNIFICANT CHANGE UP (ref 10–45)
ANION GAP SERPL CALC-SCNC: 10 MMOL/L — SIGNIFICANT CHANGE UP (ref 5–17)
ANISOCYTOSIS BLD QL: SIGNIFICANT CHANGE UP
AST SERPL-CCNC: 17 U/L — SIGNIFICANT CHANGE UP (ref 10–40)
BILIRUB SERPL-MCNC: 0.3 MG/DL — SIGNIFICANT CHANGE UP (ref 0.2–1.2)
BUN SERPL-MCNC: 11 MG/DL — SIGNIFICANT CHANGE UP (ref 7–23)
CALCIUM SERPL-MCNC: 8.6 MG/DL — SIGNIFICANT CHANGE UP (ref 8.4–10.5)
CHLORIDE SERPL-SCNC: 105 MMOL/L — SIGNIFICANT CHANGE UP (ref 96–108)
CO2 SERPL-SCNC: 22 MMOL/L — SIGNIFICANT CHANGE UP (ref 22–31)
CREAT SERPL-MCNC: 0.81 MG/DL — SIGNIFICANT CHANGE UP (ref 0.5–1.3)
CRP SERPL-MCNC: 0.09 MG/DL — SIGNIFICANT CHANGE UP (ref 0–0.4)
GLUCOSE SERPL-MCNC: 99 MG/DL — SIGNIFICANT CHANGE UP (ref 70–99)
HCT VFR BLD CALC: 37 % — LOW (ref 39–50)
HGB BLD-MCNC: 11.7 G/DL — LOW (ref 13–17)
HYPOCHROMIA BLD QL: SLIGHT — SIGNIFICANT CHANGE UP
MACROCYTES BLD QL: SLIGHT — SIGNIFICANT CHANGE UP
MCHC RBC-ENTMCNC: 23.4 PG — LOW (ref 27–34)
MCHC RBC-ENTMCNC: 31.6 G/DL — LOW (ref 32–36)
MCV RBC AUTO: 74.1 FL — LOW (ref 80–100)
MICROCYTES BLD QL: SIGNIFICANT CHANGE UP
OVALOCYTES BLD QL SMEAR: SLIGHT — SIGNIFICANT CHANGE UP
PLAT MORPH BLD: NORMAL — SIGNIFICANT CHANGE UP
PLATELET # BLD AUTO: 208 K/UL — SIGNIFICANT CHANGE UP (ref 150–400)
POIKILOCYTOSIS BLD QL AUTO: SLIGHT — SIGNIFICANT CHANGE UP
POLYCHROMASIA BLD QL SMEAR: SLIGHT — SIGNIFICANT CHANGE UP
POTASSIUM SERPL-MCNC: 3.8 MMOL/L — SIGNIFICANT CHANGE UP (ref 3.5–5.3)
POTASSIUM SERPL-SCNC: 3.8 MMOL/L — SIGNIFICANT CHANGE UP (ref 3.5–5.3)
PROT SERPL-MCNC: 6.1 G/DL — SIGNIFICANT CHANGE UP (ref 6–8.3)
RBC # BLD: 4.99 M/UL — SIGNIFICANT CHANGE UP (ref 4.2–5.8)
RBC BLD AUTO: ABNORMAL
SCHISTOCYTES BLD QL AUTO: SIGNIFICANT CHANGE UP
SODIUM SERPL-SCNC: 137 MMOL/L — SIGNIFICANT CHANGE UP (ref 135–145)
SPHEROCYTES BLD QL SMEAR: SLIGHT — SIGNIFICANT CHANGE UP
WBC # BLD: 4.1 K/UL — SIGNIFICANT CHANGE UP (ref 3.8–10.5)
WBC # FLD AUTO: 4.1 K/UL — SIGNIFICANT CHANGE UP (ref 3.8–10.5)

## 2018-06-18 PROCEDURE — 96375 TX/PRO/DX INJ NEW DRUG ADDON: CPT

## 2018-06-18 PROCEDURE — 80053 COMPREHEN METABOLIC PANEL: CPT

## 2018-06-18 PROCEDURE — 96413 CHEMO IV INFUSION 1 HR: CPT

## 2018-06-18 PROCEDURE — 85027 COMPLETE CBC AUTOMATED: CPT

## 2018-06-18 PROCEDURE — 86140 C-REACTIVE PROTEIN: CPT

## 2018-06-18 PROCEDURE — 96415 CHEMO IV INFUSION ADDL HR: CPT

## 2018-06-18 PROCEDURE — 36415 COLL VENOUS BLD VENIPUNCTURE: CPT

## 2018-06-18 RX ADMIN — Medication 650 MILLIGRAM(S): at 09:45

## 2018-06-18 RX ADMIN — INFLIXIMAB-DYYB 145 MILLIGRAM(S): 120 INJECTION SUBCUTANEOUS at 09:38

## 2018-06-18 RX ADMIN — Medication 204 MILLIGRAM(S): at 09:37

## 2018-07-05 ENCOUNTER — OUTPATIENT (OUTPATIENT)
Dept: OUTPATIENT SERVICES | Facility: HOSPITAL | Age: 28
LOS: 1 days | End: 2018-07-05
Payer: COMMERCIAL

## 2018-07-05 ENCOUNTER — APPOINTMENT (OUTPATIENT)
Dept: INFUSION THERAPY | Facility: HOSPITAL | Age: 28
End: 2018-07-05

## 2018-07-05 VITALS
RESPIRATION RATE: 20 BRPM | DIASTOLIC BLOOD PRESSURE: 78 MMHG | TEMPERATURE: 97 F | HEART RATE: 68 BPM | OXYGEN SATURATION: 98 % | SYSTOLIC BLOOD PRESSURE: 117 MMHG

## 2018-07-05 VITALS
TEMPERATURE: 97 F | HEART RATE: 74 BPM | RESPIRATION RATE: 18 BRPM | SYSTOLIC BLOOD PRESSURE: 110 MMHG | DIASTOLIC BLOOD PRESSURE: 70 MMHG | OXYGEN SATURATION: 99 %

## 2018-07-05 DIAGNOSIS — K50.90 CROHN'S DISEASE, UNSPECIFIED, WITHOUT COMPLICATIONS: ICD-10-CM

## 2018-07-05 DIAGNOSIS — Z98.890 OTHER SPECIFIED POSTPROCEDURAL STATES: Chronic | ICD-10-CM

## 2018-07-05 LAB
ALBUMIN SERPL ELPH-MCNC: 4 G/DL — SIGNIFICANT CHANGE UP (ref 3.3–5)
ALP SERPL-CCNC: 49 U/L — SIGNIFICANT CHANGE UP (ref 40–120)
ALT FLD-CCNC: SIGNIFICANT CHANGE UP U/L (ref 10–45)
ANION GAP SERPL CALC-SCNC: 10 MMOL/L — SIGNIFICANT CHANGE UP (ref 5–17)
AST SERPL-CCNC: SIGNIFICANT CHANGE UP U/L (ref 10–40)
BILIRUB DIRECT SERPL-MCNC: <0.2 MG/DL — SIGNIFICANT CHANGE UP (ref 0–0.2)
BILIRUB INDIRECT FLD-MCNC: >0 MG/DL — LOW (ref 0.2–1)
BILIRUB SERPL-MCNC: 0.2 MG/DL — SIGNIFICANT CHANGE UP (ref 0.2–1.2)
BUN SERPL-MCNC: 9 MG/DL — SIGNIFICANT CHANGE UP (ref 7–23)
CALCIUM SERPL-MCNC: 9.2 MG/DL — SIGNIFICANT CHANGE UP (ref 8.4–10.5)
CHLORIDE SERPL-SCNC: 103 MMOL/L — SIGNIFICANT CHANGE UP (ref 96–108)
CO2 SERPL-SCNC: 23 MMOL/L — SIGNIFICANT CHANGE UP (ref 22–31)
CREAT SERPL-MCNC: 0.95 MG/DL — SIGNIFICANT CHANGE UP (ref 0.5–1.3)
CRP SERPL-MCNC: <0.03 MG/DL — SIGNIFICANT CHANGE UP (ref 0–0.4)
GLUCOSE SERPL-MCNC: 98 MG/DL — SIGNIFICANT CHANGE UP (ref 70–99)
HCT VFR BLD CALC: 43.1 % — SIGNIFICANT CHANGE UP (ref 39–50)
HGB BLD-MCNC: 13.8 G/DL — SIGNIFICANT CHANGE UP (ref 13–17)
MCHC RBC-ENTMCNC: 25.4 PG — LOW (ref 27–34)
MCHC RBC-ENTMCNC: 32 G/DL — SIGNIFICANT CHANGE UP (ref 32–36)
MCV RBC AUTO: 79.4 FL — LOW (ref 80–100)
PLATELET # BLD AUTO: 124 K/UL — LOW (ref 150–400)
POTASSIUM SERPL-MCNC: SIGNIFICANT CHANGE UP MMOL/L (ref 3.5–5.3)
POTASSIUM SERPL-SCNC: SIGNIFICANT CHANGE UP MMOL/L (ref 3.5–5.3)
PROT SERPL-MCNC: 6.5 G/DL — SIGNIFICANT CHANGE UP (ref 6–8.3)
RBC # BLD: 5.43 M/UL — SIGNIFICANT CHANGE UP (ref 4.2–5.8)
SODIUM SERPL-SCNC: 136 MMOL/L — SIGNIFICANT CHANGE UP (ref 135–145)
WBC # BLD: 5.4 K/UL — SIGNIFICANT CHANGE UP (ref 3.8–10.5)
WBC # FLD AUTO: 5.4 K/UL — SIGNIFICANT CHANGE UP (ref 3.8–10.5)

## 2018-07-05 PROCEDURE — 85027 COMPLETE CBC AUTOMATED: CPT

## 2018-07-05 PROCEDURE — 96375 TX/PRO/DX INJ NEW DRUG ADDON: CPT

## 2018-07-05 PROCEDURE — 96415 CHEMO IV INFUSION ADDL HR: CPT

## 2018-07-05 PROCEDURE — 80076 HEPATIC FUNCTION PANEL: CPT

## 2018-07-05 PROCEDURE — 96413 CHEMO IV INFUSION 1 HR: CPT

## 2018-07-05 PROCEDURE — 80048 BASIC METABOLIC PNL TOTAL CA: CPT

## 2018-07-05 PROCEDURE — 86140 C-REACTIVE PROTEIN: CPT

## 2018-07-05 PROCEDURE — 36415 COLL VENOUS BLD VENIPUNCTURE: CPT

## 2018-07-05 RX ORDER — ACETAMINOPHEN 500 MG
650 TABLET ORAL ONCE
Qty: 0 | Refills: 0 | Status: COMPLETED | OUTPATIENT
Start: 2018-07-05 | End: 2018-07-05

## 2018-07-05 RX ORDER — INFLIXIMAB-DYYB 120 MG/ML
400 INJECTION SUBCUTANEOUS ONCE
Qty: 0 | Refills: 0 | Status: COMPLETED | OUTPATIENT
Start: 2018-07-05 | End: 2018-07-05

## 2018-07-05 RX ORDER — DIPHENHYDRAMINE HCL 50 MG
50 CAPSULE ORAL ONCE
Qty: 0 | Refills: 0 | Status: COMPLETED | OUTPATIENT
Start: 2018-07-05 | End: 2018-07-05

## 2018-07-05 RX ADMIN — Medication 650 MILLIGRAM(S): at 11:06

## 2018-07-05 RX ADMIN — Medication 204 MILLIGRAM(S): at 11:06

## 2018-07-05 RX ADMIN — INFLIXIMAB-DYYB 145 MILLIGRAM(S): 120 INJECTION SUBCUTANEOUS at 11:15

## 2018-07-18 ENCOUNTER — LABORATORY RESULT (OUTPATIENT)
Age: 28
End: 2018-07-18

## 2018-07-18 ENCOUNTER — APPOINTMENT (OUTPATIENT)
Dept: GASTROENTEROLOGY | Facility: CLINIC | Age: 28
End: 2018-07-18
Payer: COMMERCIAL

## 2018-07-18 VITALS
RESPIRATION RATE: 14 BRPM | BODY MASS INDEX: 22.54 KG/M2 | WEIGHT: 161 LBS | HEIGHT: 71 IN | HEART RATE: 85 BPM | OXYGEN SATURATION: 97 % | TEMPERATURE: 98.1 F | SYSTOLIC BLOOD PRESSURE: 110 MMHG | DIASTOLIC BLOOD PRESSURE: 70 MMHG

## 2018-07-18 PROCEDURE — 36415 COLL VENOUS BLD VENIPUNCTURE: CPT | Mod: GC

## 2018-07-18 PROCEDURE — 99214 OFFICE O/P EST MOD 30 MIN: CPT | Mod: 25,GC

## 2018-07-19 ENCOUNTER — FORM ENCOUNTER (OUTPATIENT)
Age: 28
End: 2018-07-19

## 2018-07-19 VITALS
HEART RATE: 85 BPM | DIASTOLIC BLOOD PRESSURE: 94 MMHG | WEIGHT: 162.04 LBS | TEMPERATURE: 99 F | SYSTOLIC BLOOD PRESSURE: 145 MMHG | OXYGEN SATURATION: 100 % | RESPIRATION RATE: 18 BRPM

## 2018-07-19 LAB
ALBUMIN SERPL ELPH-MCNC: 4.5 G/DL
ALP BLD-CCNC: 58 U/L
ALT SERPL-CCNC: 19 U/L
ANION GAP SERPL CALC-SCNC: 15 MMOL/L
AST SERPL-CCNC: 19 U/L
BASOPHILS # BLD AUTO: 0.03 K/UL
BASOPHILS NFR BLD AUTO: 0.5 %
BILIRUB SERPL-MCNC: 0.5 MG/DL
BUN SERPL-MCNC: 11 MG/DL
CALCIUM SERPL-MCNC: 10.1 MG/DL
CHLORIDE SERPL-SCNC: 104 MMOL/L
CO2 SERPL-SCNC: 21 MMOL/L
CREAT SERPL-MCNC: 0.98 MG/DL
CRP SERPL-MCNC: <0.1 MG/DL
EOSINOPHIL # BLD AUTO: 0.08 K/UL
EOSINOPHIL NFR BLD AUTO: 1.2 %
GLUCOSE SERPL-MCNC: 85 MG/DL
HCT VFR BLD CALC: 45.5 %
HGB BLD-MCNC: 15 G/DL
IMM GRANULOCYTES NFR BLD AUTO: 0.2 %
LYMPHOCYTES # BLD AUTO: 1.6 K/UL
LYMPHOCYTES NFR BLD AUTO: 24.4 %
MAN DIFF?: NORMAL
MCHC RBC-ENTMCNC: 26.9 PG
MCHC RBC-ENTMCNC: 33 GM/DL
MCV RBC AUTO: 81.7 FL
MONOCYTES # BLD AUTO: 0.36 K/UL
MONOCYTES NFR BLD AUTO: 5.5 %
NEUTROPHILS # BLD AUTO: 4.48 K/UL
NEUTROPHILS NFR BLD AUTO: 68.2 %
PLATELET # BLD AUTO: 202 K/UL
POTASSIUM SERPL-SCNC: 4.1 MMOL/L
PROT SERPL-MCNC: 7 G/DL
RBC # BLD: 5.57 M/UL
RBC # FLD: 24.1 %
SODIUM SERPL-SCNC: 140 MMOL/L
WBC # FLD AUTO: 6.56 K/UL

## 2018-07-19 NOTE — ED ADULT TRIAGE NOTE - CHIEF COMPLAINT QUOTE
pt with hx Crohn's disease with rectal bleeding started this AM dark liquid stool denies new abd pain N/V

## 2018-07-20 ENCOUNTER — INPATIENT (INPATIENT)
Facility: HOSPITAL | Age: 28
LOS: 6 days | Discharge: HOME | DRG: 330 | End: 2018-07-27
Attending: COLON & RECTAL SURGERY | Admitting: COLON & RECTAL SURGERY
Payer: COMMERCIAL

## 2018-07-20 DIAGNOSIS — Z98.890 OTHER SPECIFIED POSTPROCEDURAL STATES: Chronic | ICD-10-CM

## 2018-07-20 LAB
HCT VFR BLD CALC: 34.5 % — LOW (ref 39–50)
HGB BLD-MCNC: 11.3 G/DL — LOW (ref 13–17)
MCHC RBC-ENTMCNC: 26.3 PG — LOW (ref 27–34)
MCHC RBC-ENTMCNC: 32.8 G/DL — SIGNIFICANT CHANGE UP (ref 32–36)
MCV RBC AUTO: 80.4 FL — SIGNIFICANT CHANGE UP (ref 80–100)
PLATELET # BLD AUTO: 173 K/UL — SIGNIFICANT CHANGE UP (ref 150–400)
RBC # BLD: 4.29 M/UL — SIGNIFICANT CHANGE UP (ref 4.2–5.8)
WBC # BLD: 6.3 K/UL — SIGNIFICANT CHANGE UP (ref 3.8–10.5)
WBC # FLD AUTO: 6.3 K/UL — SIGNIFICANT CHANGE UP (ref 3.8–10.5)

## 2018-07-20 PROCEDURE — 99254 IP/OBS CNSLTJ NEW/EST MOD 60: CPT | Mod: 25,GC

## 2018-07-20 PROCEDURE — 45378 DIAGNOSTIC COLONOSCOPY: CPT | Mod: GC

## 2018-07-20 PROCEDURE — 99285 EMERGENCY DEPT VISIT HI MDM: CPT | Mod: 25

## 2018-07-20 RX ORDER — SODIUM CHLORIDE 9 MG/ML
1000 INJECTION INTRAMUSCULAR; INTRAVENOUS; SUBCUTANEOUS
Qty: 0 | Refills: 0 | Status: DISCONTINUED | OUTPATIENT
Start: 2018-07-20 | End: 2018-07-23

## 2018-07-20 RX ORDER — POLYETHYLENE GLYCOL 3350 17 G/17G
238 POWDER, FOR SOLUTION ORAL ONCE
Qty: 0 | Refills: 0 | Status: COMPLETED | OUTPATIENT
Start: 2018-07-20 | End: 2018-07-20

## 2018-07-20 RX ORDER — SODIUM CHLORIDE 9 MG/ML
1000 INJECTION INTRAMUSCULAR; INTRAVENOUS; SUBCUTANEOUS ONCE
Qty: 0 | Refills: 0 | Status: COMPLETED | OUTPATIENT
Start: 2018-07-20 | End: 2018-07-20

## 2018-07-20 RX ORDER — IOHEXOL 300 MG/ML
30 INJECTION, SOLUTION INTRAVENOUS ONCE
Qty: 0 | Refills: 0 | Status: COMPLETED | OUTPATIENT
Start: 2018-07-20 | End: 2018-07-20

## 2018-07-20 RX ADMIN — SODIUM CHLORIDE 1000 MILLILITER(S): 9 INJECTION INTRAMUSCULAR; INTRAVENOUS; SUBCUTANEOUS at 01:34

## 2018-07-20 RX ADMIN — POLYETHYLENE GLYCOL 3350 238 GRAM(S): 17 POWDER, FOR SOLUTION ORAL at 08:50

## 2018-07-20 RX ADMIN — SODIUM CHLORIDE 100 MILLILITER(S): 9 INJECTION INTRAMUSCULAR; INTRAVENOUS; SUBCUTANEOUS at 03:13

## 2018-07-20 RX ADMIN — IOHEXOL 30 MILLILITER(S): 300 INJECTION, SOLUTION INTRAVENOUS at 01:42

## 2018-07-20 NOTE — H&P ADULT - ASSESSMENT
28 year old male with history of Chron' s disease s/p ileocolic resection (1990 for intussusception) and SBR (2013) who presents with BRBPR x 1 day   - admit to regional surgery service   - no urgent surgical intervention indicated at this time   - NPO / IVF   - Pain / nausea control   - Remicade (infliximab) home medication for Crohn's   - AM cbc (no indication for transfusion at this time)   - Consult GI in AM for coloscopy   - discussed with chief resident on call   - discussed with attending on call 28 year old male with history of Crohn's disease s/p ileocolic resection (1990 for intussusception) and SBR (2013) who presents with BRBPR x 1 day   - admit to regional surgery service   - no urgent surgical intervention indicated at this time   - NPO / IVF   - Pain / nausea control   - Remicade (infliximab) home medication for Crohn's   - AM cbc (no indication for transfusion at this time)   - Consult GI in AM for coloscopy   - discussed with chief resident on call   - discussed with attending on call

## 2018-07-20 NOTE — CONSULT NOTE ADULT - ASSESSMENT
28 year old male with history of Crohn' s disease s/p ileocolic resection (1990 for intussusception) and SBR (2013) with recent admission for LGIB s/p colonoscopy demonstrating ileocolonic anastamotic ulcer without active bleeding who presents with LGIB x 1 day. While it was initially believed that the bleeding could be 2/2 to CD - pt has since started induction with remicade with normal inflammatory markers (CRP <1). It is possible that the bleed could still be from the ulcer site, but pt will likely need endoscopic evaluation to determine source of bleed.     #LGIB   -Keep NPO  -Please give miralax prep (238 gm in 64 oz of water -- please complete please by 11 am to ensure adequate time prior to procedure)  -Monitor hgb, transfuse to goal >7  -previous c-scope reviewed  -IF developed profuse bleeding w/ evidence of hemodynamic instability consider CTA for IR embolization (please call GI first)    case to be d/w Dr. Hannah

## 2018-07-20 NOTE — H&P ADULT - HISTORY OF PRESENT ILLNESS
28 year old male with history of Croh' s disease s/p ileocolic resection (1990 for intussusception) and SBR (2013) who presents with LGIB x 1 day. Patient states that he had three episodes of bright red blood per rectum. The patient was recently admitted to the surgical service in April of this year for a similar chief complaint at which time colonoscopy demonstrated an ulcer at the ileocolic resection but no active source of bleeding. Outpatient CT enterography demonstrated diffuse inflammation consistent with Crohn's disease.   Patient currently denies nausea / vomiting / fevers / chills / dysuria. 28 year old male with history of Crohn' s disease s/p ileocolic resection (1990 for intussusception) and SBR (2013) who presents with LGIB x 1 day. Patient states that he had three episodes of bright red blood per rectum. The patient was recently admitted to the surgical service in April of this year for a similar chief complaint at which time colonoscopy demonstrated an ulcer at the ileocolic resection but no active source of bleeding. Outpatient CT enterography demonstrated diffuse inflammation consistent with Crohn's disease.   Patient currently denies nausea / vomiting / fevers / chills / dysuria.

## 2018-07-20 NOTE — ED ADULT NURSE NOTE - OBJECTIVE STATEMENT
27 y/o male with hx of IBS, peptic ulcer, and GI bleeding arrived to [facility] ER reporting rectal bleeding starting yesterday accompanied with intermittent nausea. Pt verbalized he saw her gastroenterologist yesterday and he was recently dx with Crohn's disease. Upon assessment, abdomen soft, lung fields WNL, breathing is equal and unlabored, pulses palpable, no visible injuries noted. Pt denies chest pain, headache, dizziness, blurred vision, slurred speech, vomiting, diarrhea, fever, chills, LOC, SOB, weakness, fatigue, recent travel, recent injury, and palpitations. pt verbalized that he is currently on remicade. Care in progress.

## 2018-07-20 NOTE — PROGRESS NOTE ADULT - ASSESSMENT
28 year old male with history of Crohn's disease s/p ileocolic resection (1990 for intussusception) and SBR (2013) who presents with BRBPR x 1 day     pain/nausea control  IS/OOB  NPO, IVF  holding home remicade, f/u with GI to restart  GI to scope today  SCDs, holding HSQ

## 2018-07-20 NOTE — ED PROVIDER NOTE - OBJECTIVE STATEMENT
28 yom pw bloody stool since this AM, hx of Crohn's (childhood SB resection for ileo-cecal intussusception and distal SBR), w/ hx of GI bleed, recently started on Remicade.  no cp/sob/LH/palpitation.  no nv, no abd pain.

## 2018-07-20 NOTE — CONSULT NOTE ADULT - SUBJECTIVE AND OBJECTIVE BOX
HPI:  28 year old male with history of Crohn' s disease s/p ileocolic resection (1990 for intussusception) and SBR (2013) who presents with LGIB x 1 day. Patient states that he had three episodes of bright red blood per rectum. The patient was recently admitted to the surgical service in April of this year for a similar chief complaint at which time colonoscopy demonstrated an ulcer at the ileocolic resection but no active source of bleeding. Outpatient CT enterography demonstrated diffuse inflammation consistent with Crohn's disease.   Patient was recently seen in GI clinic this week with some improvement of symptoms. He states suddenly yesterday he began having multiple Bloody BMs but denies having symptomatic anemia. Denies light headedness, fatigue, CP, SOB, EVANGELISTA. Hgb during time of office visit was 15 -- > 12.       PAST MEDICAL & SURGICAL HISTORY:  Crohn disease  GI bleeding  History of bowel resection: SMALL BOWEL      Allergic/Immunologic:	    MEDICATIONS  (STANDING):  polyethylene glycol 3350 238 Gram(s) Oral once  sodium chloride 0.9%. 1000 milliLiter(s) (100 mL/Hr) IV Continuous <Continuous>    MEDICATIONS  (PRN):      Allergies    No Known Allergies    Intolerances        SOCIAL HISTORY:    FAMILY HISTORY:  Family history of gastric cancer (Mother)  Family history of Meckel's diverticulum (Father)      Vital Signs Last 24 Hrs  T(C): 36.2 (20 Jul 2018 03:15), Max: 37.1 (19 Jul 2018 23:51)  T(F): 97.2 (20 Jul 2018 03:15), Max: 98.7 (19 Jul 2018 23:51)  HR: 64 (20 Jul 2018 03:15) (64 - 85)  BP: 115/74 (20 Jul 2018 03:15) (115/74 - 145/94)  BP(mean): --  RR: 16 (20 Jul 2018 03:15) (16 - 18)  SpO2: 98% (20 Jul 2018 03:15) (98% - 100%)    PHYSICAL EXAM:    GEN: resting in bed comfortably, NAD, AOx3  HEENT: anicteric  CHEST: no w/r/r  CVS: RRR  ABD: soft, nt, nd  EXT: no edema  rectal: scant red blood (pt demonstrated cell phone image showing red blood in toilet just prior to exam)      LABS:                        12.0   6.2   )-----------( 194      ( 20 Jul 2018 00:48 )             35.8     07-20    138  |  99  |  12  ----------------------------<  106<H>  3.6   |  27  |  1.06    Ca    9.2      20 Jul 2018 00:48    TPro  6.3  /  Alb  3.8  /  TBili  0.4  /  DBili  x   /  AST  20  /  ALT  25  /  AlkPhos  50  07-20    PT/INR - ( 20 Jul 2018 00:48 )   PT: 12.0 sec;   INR: 1.08          PTT - ( 20 Jul 2018 00:48 )  PTT:30.9 sec  Urinalysis Basic - ( 20 Jul 2018 00:46 )    Color: Yellow / Appearance: Clear / SG: <=1.005 / pH: x  Gluc: x / Ketone: NEGATIVE  / Bili: Negative / Urobili: 0.2 E.U./dL   Blood: x / Protein: NEGATIVE mg/dL / Nitrite: NEGATIVE   Leuk Esterase: NEGATIVE / RBC: x / WBC x   Sq Epi: x / Non Sq Epi: x / Bacteria: x        RADIOLOGY & ADDITIONAL STUDIES:    < from: CT Abdomen and Pelvis w/ Oral Cont and w/ IV Cont (02.20.18 @ 18:56) >    EXAM:  CT ABDOMEN AND PELVIS OC IC                          PROCEDURE DATE:  02/20/2018    Quantity of Contrast in Vial in ml: 100 Contrast Used: Optiray 350  Quantity of Contrast Wasted in ml: 7           INTERPRETATION:  CT of the abdomen and pelvis with contrast dated   2/20/2018 6:56 PM    INDICATION: Abdominal pain and lower GI bleed. Rule out abdominal   collection.    TECHNIQUE: CT of abdomen and pelvis was performed using oral and   intravenous contrast. Axial, sagittal and coronal images were produced   and reviewed.    PRIOR STUDIES: None    FINDINGS: Images of the lower chest demonstrate no abnormality.    The liver is normal in appearance.  No radiopaque stones are seen in the   gallbladder.  The pancreas is normal in appearance.  No splenic   abnormalities are seen.    The adrenal glands are unremarkable. The kidneys are normal in   appearance.        No abdominal aortic aneurysm is seen. Mesenteric lymph nodes are mildly   prominent in the right lower quadrant. No retroperitoneal lymphadenopathy   is seen.     Evaluation of the bowel demonstrates evidence of prior intestinal   resection. There is a small bowel anastomosis in the left mid abdomen.   Another small bowel anastomotic loop is noted in the right lower   quadrant. There is evidence of resection and anastomosis at the ileocolic   junction now in the right upper quadrant. Mild diffuse colonic wall   thickening is which is underdistended. This is most conspicuous in the   proximal sigmoid where prominent vasa recta are seen. No discrete   intestinal mass is identified. There is no bowel obstruction or free air.   No ascites is seen.    Images of the pelvis demonstrate no bladder abnormality prostate is not   enlarged.    Evaluation of the osseous structures demonstrates no significant   abnormality. Small bone island is noted in the left femoral head.      IMPRESSION:    Multiple small bowel resections as described above.    Mild diffuse colonic wall thickening could be due to underdistention or   colitis.    Mildly prominent mesenteric lymph nodes in the right lower quadrant. No   retroperitoneal lymphadenopathy.                  "Thank you for the opportunity to participate in the care of this   patient."    COCA FRANCISCO J CASALDUC M.D., RADIOLOGY RESIDENT  This document has been electronically signed.  ANDREI HERNANDEZ M.D., ATTENDING RADIOLOGIST  This document has been electronically signed. Feb 21 2018  9:28AM                  < end of copied text >

## 2018-07-20 NOTE — PROGRESS NOTE ADULT - SUBJECTIVE AND OBJECTIVE BOX
SUBJECTIVE: Patient feeling well however still having multiple bloody bowel movements Patient seen and examined bedside by chief resident.        Vital Signs Last 24 Hrs  T(C): 36.2 (20 Jul 2018 03:15), Max: 37.1 (19 Jul 2018 23:51)  T(F): 97.2 (20 Jul 2018 03:15), Max: 98.7 (19 Jul 2018 23:51)  HR: 64 (20 Jul 2018 03:15) (64 - 85)  BP: 115/74 (20 Jul 2018 03:15) (115/74 - 145/94)  BP(mean): --  RR: 16 (20 Jul 2018 03:15) (16 - 18)  SpO2: 98% (20 Jul 2018 03:15) (98% - 100%)  I&O's Detail    19 Jul 2018 07:01  -  20 Jul 2018 07:00  --------------------------------------------------------  IN:    sodium chloride 0.9%.: 500 mL  Total IN: 500 mL    OUT:  Total OUT: 0 mL    Total NET: 500 mL          General: NAD, resting comfortably in bed  C/V: NSR  Pulm: Nonlabored breathing, no respiratory distress  Abd: soft, NT/ND      LABS:                        12.0   6.2   )-----------( 194      ( 20 Jul 2018 00:48 )             35.8     07-20    138  |  99  |  12  ----------------------------<  106<H>  3.6   |  27  |  1.06    Ca    9.2      20 Jul 2018 00:48    TPro  6.3  /  Alb  3.8  /  TBili  0.4  /  DBili  x   /  AST  20  /  ALT  25  /  AlkPhos  50  07-20    PT/INR - ( 20 Jul 2018 00:48 )   PT: 12.0 sec;   INR: 1.08          PTT - ( 20 Jul 2018 00:48 )  PTT:30.9 sec  Urinalysis Basic - ( 20 Jul 2018 00:46 )    Color: Yellow / Appearance: Clear / SG: <=1.005 / pH: x  Gluc: x / Ketone: NEGATIVE  / Bili: Negative / Urobili: 0.2 E.U./dL   Blood: x / Protein: NEGATIVE mg/dL / Nitrite: NEGATIVE   Leuk Esterase: NEGATIVE / RBC: x / WBC x   Sq Epi: x / Non Sq Epi: x / Bacteria: x

## 2018-07-20 NOTE — ED PROVIDER NOTE - MEDICAL DECISION MAKING DETAILS
Crohn's disease, hx of GI bleeding 2/2 Crohn, hx of SB resection, clinically not obstructed, last bloody BM was 1020pm, nontender, no fc, d/w GI, agree w/ CT w/ PO and IV contrast, hold abx or steroids, surg consulted for lower GI bleeding, pt hemodynamically stable, well established to surg service Dr. Garcia, pending CT and additional recs, admission surg vs med

## 2018-07-20 NOTE — PROGRESS NOTE ADULT - SUBJECTIVE AND OBJECTIVE BOX
Progress note    TIMBO MENESES  [id number]    HPI: 28 y.o man with long standing severe COLTEN due to chronic GI blood loss (Crohn's dx), recently seen in our office for mamagent of severe iron deficiency anemia. His Hgb was 7.5 on 5/30, he received 2 treatments with IV iron with excellent response to treatment-Hgb > 13.0. Now admitted with rectal bleeding    ROS:  The patient denies chest pain or SOB.    No nausea/vomiting/fevers/chills/night sweats.    + fatigue, headaches/dizziness.     No abdominal pain, + hematochezia.    No leg pain or leg swelling.    ROS is otherwise negative.    PMH/PSH:  PAST MEDICAL & SURGICAL HISTORY:  Crohn disease  Stomach ulcer  Irritable bowel syndrome with diarrhea  GI bleeding  History of bowel resection: SMALL BOWEL      Medications:  MEDICATIONS  (STANDING):  sodium chloride 0.9%. 1000 milliLiter(s) (100 mL/Hr) IV Continuous <Continuous>    MEDICATIONS  (PRN):            Allergies    No Known Allergies    Intolerances        Exam:  Vital Signs Last 24 Hrs  T(C): 36.2 (07-20-18 @ 03:15), Max: 37.1 (07-19-18 @ 23:51)  T(F): 97.2 (07-20-18 @ 03:15), Max: 98.7 (07-19-18 @ 23:51)  HR: 64 (07-20-18 @ 03:15) (64 - 85)  BP: 115/74 (07-20-18 @ 03:15) (115/74 - 145/94)  BP(mean): --  RR: 16 (07-20-18 @ 03:15) (16 - 18)  SpO2: 98% (07-20-18 @ 03:15) (98% - 100%)  HEENT: pink mucosae, anicteric sclerae  No palpable peripheral lymphadenopathy  COR: regular rhytm, rate, no murmurs, rubs or gallops  PULMO: clear to auscultation B/L  ABD: soft, no palpable masses, no splenomegaly  EXT: no edema  NEURO: intact  SKIN: no lesions on visible skin    Labs:  CBC Full  -  ( 20 Jul 2018 00:48 )  WBC Count : 6.2 K/uL  Hemoglobin : 12.0 g/dL  Hematocrit : 35.8 %  Platelet Count - Automated : 194 K/uL  Mean Cell Volume : 79.2 fL  Mean Cell Hemoglobin : 26.5 pg  Mean Cell Hemoglobin Concentration : 33.5 g/dL  Auto Neutrophil # : x  Auto Lymphocyte # : x  Auto Monocyte # : x  Auto Eosinophil # : x  Auto Basophil # : x  Auto Neutrophil % : 64.6 %  Auto Lymphocyte % : 25.9 %  Auto Monocyte % : 8.4 %  Auto Eosinophil % : 0.8 %  Auto Basophil % : 0.3 %    PT/INR - ( 20 Jul 2018 00:48 )   PT: 12.0 sec;   INR: 1.08          PTT - ( 20 Jul 2018 00:48 )  PTT:30.9 sec    07-20    138  |  99  |  12  ----------------------------<  106<H>  3.6   |  27  |  1.06    Ca    9.2      20 Jul 2018 00:48    TPro  6.3  /  Alb  3.8  /  TBili  0.4  /  DBili  x   /  AST  20  /  ALT  25  /  AlkPhos  50  07-20      Pertinent imaging studies: reviewed    Assessment:    Severe iron deficiency anemia  Chron's disease    Plan:    Clinically stable  Admitted for management of hematochezia  S/p recent IV iron tx with excellent response  Monitor CBC closely, PRBC as clinically indicated  We will arrange outpatient follow up, he will likely need periodic IV iron tx    Thank you    Rosa Garcia MD  [phone]

## 2018-07-20 NOTE — H&P ADULT - NSHPLABSRESULTS_GEN_ALL_CORE
12.0   6.2   )-----------( 194      ( 20 Jul 2018 00:48 )             35.8         07-20    138  |  99  |  12  ----------------------------<  106<H>  3.6   |  27  |  1.06    Ca    9.2      20 Jul 2018 00:48    TPro  6.3  /  Alb  3.8  /  TBili  0.4  /  DBili  x   /  AST  20  /  ALT  25  /  AlkPhos  50  07-20

## 2018-07-21 PROBLEM — K50.90 CROHN'S DISEASE, UNSPECIFIED, WITHOUT COMPLICATIONS: Chronic | Status: ACTIVE | Noted: 2018-07-20

## 2018-07-21 LAB
ANION GAP SERPL CALC-SCNC: 14 MMOL/L — SIGNIFICANT CHANGE UP (ref 5–17)
BUN SERPL-MCNC: 7 MG/DL — SIGNIFICANT CHANGE UP (ref 7–23)
CALCIUM SERPL-MCNC: 8.4 MG/DL — SIGNIFICANT CHANGE UP (ref 8.4–10.5)
CHLORIDE SERPL-SCNC: 103 MMOL/L — SIGNIFICANT CHANGE UP (ref 96–108)
CO2 SERPL-SCNC: 24 MMOL/L — SIGNIFICANT CHANGE UP (ref 22–31)
CREAT SERPL-MCNC: 1 MG/DL — SIGNIFICANT CHANGE UP (ref 0.5–1.3)
GLUCOSE SERPL-MCNC: 74 MG/DL — SIGNIFICANT CHANGE UP (ref 70–99)
HCT VFR BLD CALC: 33.7 % — LOW (ref 39–50)
HGB BLD-MCNC: 10.7 G/DL — LOW (ref 13–17)
MAGNESIUM SERPL-MCNC: 1.8 MG/DL — SIGNIFICANT CHANGE UP (ref 1.6–2.6)
MCHC RBC-ENTMCNC: 26.4 PG — LOW (ref 27–34)
MCHC RBC-ENTMCNC: 31.8 G/DL — LOW (ref 32–36)
MCV RBC AUTO: 83 FL — SIGNIFICANT CHANGE UP (ref 80–100)
PHOSPHATE SERPL-MCNC: 3.1 MG/DL — SIGNIFICANT CHANGE UP (ref 2.5–4.5)
PLATELET # BLD AUTO: 174 K/UL — SIGNIFICANT CHANGE UP (ref 150–400)
POTASSIUM SERPL-MCNC: 3.6 MMOL/L — SIGNIFICANT CHANGE UP (ref 3.5–5.3)
POTASSIUM SERPL-SCNC: 3.6 MMOL/L — SIGNIFICANT CHANGE UP (ref 3.5–5.3)
RBC # BLD: 4.06 M/UL — LOW (ref 4.2–5.8)
RBC # FLD: 21.9 % — HIGH (ref 10.3–16.9)
SODIUM SERPL-SCNC: 141 MMOL/L — SIGNIFICANT CHANGE UP (ref 135–145)
WBC # BLD: 5.9 K/UL — SIGNIFICANT CHANGE UP (ref 3.8–10.5)
WBC # FLD AUTO: 5.9 K/UL — SIGNIFICANT CHANGE UP (ref 3.8–10.5)

## 2018-07-21 RX ADMIN — SODIUM CHLORIDE 100 MILLILITER(S): 9 INJECTION INTRAMUSCULAR; INTRAVENOUS; SUBCUTANEOUS at 23:41

## 2018-07-21 NOTE — PROGRESS NOTE ADULT - ASSESSMENT
28 year old male with history of Crohn' s disease s/p ileocolic resection (1990 for intussusception) and SBR (2013) with recent admission for LGIB s/p colonoscopy demonstrating ileocolonic anastamotic ulcer without active bleeding who presents with LGIB x 1 day. While it was initially believed that the bleeding could be 2/2 to CD - pt has since started induction with remicade with normal inflammatory markers (CRP <1).    # LGIB 2/2 likely ischemic anastomotic ulcer  - s/p colonoscopy showing anastomotic ulcer with e/o recent bleeding and scattered apthae in the galo-TI  - H/H slightly downtrending, however, no further e/o acute GI bleeding (i.e. melena, hematochezia)  - Per colorectal, plan for surgery Monday  - Clear liquid diet, advance as tolerated  - Monitor H/H, transfuse if Hgb <7  - If he develops profuse bleeding w/ evidence of hemodynamic instability consider CTA for IR embolization (please call GI first)    Case d/w Dr. Hannah

## 2018-07-21 NOTE — PROGRESS NOTE ADULT - ASSESSMENT
28 year old male with history of Crohn's disease s/p ileocolic resection (1990 for intussusception) and SBR (2013) who presents with BRBPR x 1 day     pain/nausea control  IS/OOB  NPO, IVF  holding home remicade, f/u with GI to restart  SCDs, holding HSQ Assessment:  28 year old male with history of Crohn's disease s/p ileocolic resection (1990 for intussusception) and SBR (2013) who presents with BRBPR x 1 day     Plan:  pain/nausea control  IS/OOB  NPO, IVF  holding home remicade, f/u with GI to restart  SCDs, holding HSQ    bleeding ulcer at anastomosis  AVSS  Abd soft  HGB 10.7 gm    clears   IVF  Surgery monday Assessment:  28 year old male with history of Crohn's disease s/p ileocolic resection (1990 for intussusception) and SBR (2013) who presents with BRBPR x 1 day     Plan:  pain/nausea control  IS/OOB  CLD  holding home remicade, f/u with GI to restart  SCDs, holding HSQ

## 2018-07-21 NOTE — PROGRESS NOTE ADULT - SUBJECTIVE AND OBJECTIVE BOX
Pt seen and examined at bedside. VETO overnight. No current complaints, denies further episodes of rectal bleeding. Tolerating clear liquid diet.     PERTINENT REVIEW OF SYSTEMS:  CONSTITUTIONAL: No weakness, fevers or chills  HEENT: No visual changes; No vertigo or throat pain   GASTROINTESTINAL: No abdominal or epigastric pain. No nausea, vomiting, or hematemesis; No diarrhea or constipation. No melena or hematochezia.  NEUROLOGICAL: No numbness or weakness  SKIN: No itching, burning, rashes, or lesions     Allergies    No Known Allergies    MEDICATIONS:  MEDICATIONS  (STANDING):  sodium chloride 0.9%. 1000 milliLiter(s) (100 mL/Hr) IV Continuous <Continuous>    Vital Signs Last 24 Hrs  T(C): 37.1 (21 Jul 2018 08:37), Max: 37.1 (20 Jul 2018 21:26)  T(F): 98.7 (21 Jul 2018 08:37), Max: 98.7 (20 Jul 2018 21:26)  HR: 81 (21 Jul 2018 08:37) (63 - 85)  BP: 123/67 (21 Jul 2018 08:37) (115/68 - 123/67)  BP(mean): --  RR: 17 (21 Jul 2018 08:37) (16 - 17)  SpO2: 100% (21 Jul 2018 08:37) (99% - 100%)    07-20 @ 07:01 - 07-21 @ 07:00  --------------------------------------------------------  IN: 940 mL / OUT: 1600 mL / NET: -660 mL    07-21 @ 07:01 - 07-21 @ 11:26  --------------------------------------------------------  IN: 730 mL / OUT: 800 mL / NET: -70 mL      PHYSICAL EXAM:    General: Well developed; well nourished; in no acute distress  HEENT: MMM, conjunctiva and sclera clear  Gastrointestinal: Soft mild RLQ TTPnon-distended; . No rebound or guarding  Skin: Warm and dry. No obvious rash    LABS:                        10.7   5.9   )-----------( 174      ( 21 Jul 2018 06:22 )             33.7     07-21    141  |  103  |  7   ----------------------------<  74  3.6   |  24  |  1.00    Ca    8.4      21 Jul 2018 06:22  Phos  3.1     07-21  Mg     1.8     07-21    TPro  6.3  /  Alb  3.8  /  TBili  0.4  /  DBili  x   /  AST  20  /  ALT  25  /  AlkPhos  50  07-20    PT/INR - ( 20 Jul 2018 00:48 )   PT: 12.0 sec;   INR: 1.08        PTT - ( 20 Jul 2018 00:48 )  PTT:30.9 sec    Urinalysis Basic - ( 20 Jul 2018 00:46 )    Color: Yellow / Appearance: Clear / SG: <=1.005 / pH: x  Gluc: x / Ketone: NEGATIVE  / Bili: Negative / Urobili: 0.2 E.U./dL   Blood: x / Protein: NEGATIVE mg/dL / Nitrite: NEGATIVE   Leuk Esterase: NEGATIVE / RBC: x / WBC x   Sq Epi: x / Non Sq Epi: x / Bacteria: x    Culture - Urine (collected 20 Jul 2018 08:42)  Source: .Urine Clean Catch (Midstream)  Final Report (21 Jul 2018 09:09):    No growth    Culture - Blood (collected 20 Jul 2018 01:53)  Source: .Blood Blood  Preliminary Report (21 Jul 2018 02:01):    No growth at 1 day.    Culture - Blood (collected 20 Jul 2018 01:53)  Source: .Blood Blood  Preliminary Report (21 Jul 2018 02:01):    No growth at 1 day.    RADIOLOGY & ADDITIONAL STUDIES: No new radiologic studies.

## 2018-07-21 NOTE — PROGRESS NOTE ADULT - SUBJECTIVE AND OBJECTIVE BOX
SUBJECTIVE:   Patient states he passed blood from below last night. Also states he had bm overnight. Patient denies pain. Tolerating CLD.    Vital Signs Last 24 Hrs  T(C): 37.1 (21 Jul 2018 08:37), Max: 37.1 (20 Jul 2018 21:26)  T(F): 98.7 (21 Jul 2018 08:37), Max: 98.7 (20 Jul 2018 21:26)  HR: 81 (21 Jul 2018 08:37) (63 - 85)  BP: 123/67 (21 Jul 2018 08:37) (115/68 - 123/67)  BP(mean): --  RR: 17 (21 Jul 2018 08:37) (16 - 17)  SpO2: 100% (21 Jul 2018 08:37) (99% - 100%)    General: NAD, resting comfortably in bed  Pulm: Nonlabored breathing, no respiratory distress  Abd: soft, NT/ND.  Extrem: WWP, no edema, SCDs in place      LABS:                        10.7   5.9   )-----------( 174      ( 21 Jul 2018 06:22 )             33.7     07-21    141  |  103  |  7   ----------------------------<  74  3.6   |  24  |  1.00    Ca    8.4      21 Jul 2018 06:22  Phos  3.1     07-21  Mg     1.8     07-21    TPro  6.3  /  Alb  3.8  /  TBili  0.4  /  DBili  x   /  AST  20  /  ALT  25  /  AlkPhos  50  07-20    PT/INR - ( 20 Jul 2018 00:48 )   PT: 12.0 sec;   INR: 1.08          PTT - ( 20 Jul 2018 00:48 )  PTT:30.9 sec

## 2018-07-22 LAB
ANION GAP SERPL CALC-SCNC: 8 MMOL/L — SIGNIFICANT CHANGE UP (ref 5–17)
BUN SERPL-MCNC: 6 MG/DL — LOW (ref 7–23)
CALCIUM SERPL-MCNC: 8.4 MG/DL — SIGNIFICANT CHANGE UP (ref 8.4–10.5)
CHLORIDE SERPL-SCNC: 106 MMOL/L — SIGNIFICANT CHANGE UP (ref 96–108)
CO2 SERPL-SCNC: 27 MMOL/L — SIGNIFICANT CHANGE UP (ref 22–31)
CREAT SERPL-MCNC: 1.05 MG/DL — SIGNIFICANT CHANGE UP (ref 0.5–1.3)
GLUCOSE SERPL-MCNC: 96 MG/DL — SIGNIFICANT CHANGE UP (ref 70–99)
HCT VFR BLD CALC: 31.7 % — LOW (ref 39–50)
HGB BLD-MCNC: 10.2 G/DL — LOW (ref 13–17)
MAGNESIUM SERPL-MCNC: 1.7 MG/DL — SIGNIFICANT CHANGE UP (ref 1.6–2.6)
MCHC RBC-ENTMCNC: 26.7 PG — LOW (ref 27–34)
MCHC RBC-ENTMCNC: 32.2 G/DL — SIGNIFICANT CHANGE UP (ref 32–36)
MCV RBC AUTO: 83 FL — SIGNIFICANT CHANGE UP (ref 80–100)
PHOSPHATE SERPL-MCNC: 3 MG/DL — SIGNIFICANT CHANGE UP (ref 2.5–4.5)
PLATELET # BLD AUTO: 166 K/UL — SIGNIFICANT CHANGE UP (ref 150–400)
POTASSIUM SERPL-MCNC: 3.8 MMOL/L — SIGNIFICANT CHANGE UP (ref 3.5–5.3)
POTASSIUM SERPL-SCNC: 3.8 MMOL/L — SIGNIFICANT CHANGE UP (ref 3.5–5.3)
RBC # BLD: 3.82 M/UL — LOW (ref 4.2–5.8)
RBC # FLD: 21.1 % — HIGH (ref 10.3–16.9)
SODIUM SERPL-SCNC: 141 MMOL/L — SIGNIFICANT CHANGE UP (ref 135–145)
WBC # BLD: 4.4 K/UL — SIGNIFICANT CHANGE UP (ref 3.8–10.5)
WBC # FLD AUTO: 4.4 K/UL — SIGNIFICANT CHANGE UP (ref 3.8–10.5)

## 2018-07-22 RX ORDER — NEOMYCIN SULFATE 500 MG/1
1000 TABLET ORAL ONCE
Qty: 0 | Refills: 0 | Status: COMPLETED | OUTPATIENT
Start: 2018-07-22 | End: 2018-07-22

## 2018-07-22 RX ORDER — ERYTHROMYCIN ETHYLSUCCINATE 400 MG
1000 TABLET ORAL ONCE
Qty: 0 | Refills: 0 | Status: COMPLETED | OUTPATIENT
Start: 2018-07-22 | End: 2018-07-22

## 2018-07-22 RX ORDER — POTASSIUM CHLORIDE 20 MEQ
20 PACKET (EA) ORAL ONCE
Qty: 0 | Refills: 0 | Status: COMPLETED | OUTPATIENT
Start: 2018-07-22 | End: 2018-07-22

## 2018-07-22 RX ORDER — ACETAMINOPHEN 500 MG
650 TABLET ORAL EVERY 6 HOURS
Qty: 0 | Refills: 0 | Status: DISCONTINUED | OUTPATIENT
Start: 2018-07-22 | End: 2018-07-23

## 2018-07-22 RX ORDER — POLYETHYLENE GLYCOL 3350 17 G/17G
17 POWDER, FOR SOLUTION ORAL ONCE
Qty: 0 | Refills: 0 | Status: DISCONTINUED | OUTPATIENT
Start: 2018-07-22 | End: 2018-07-22

## 2018-07-22 RX ORDER — POLYETHYLENE GLYCOL 3350 17 G/17G
17 POWDER, FOR SOLUTION ORAL ONCE
Qty: 0 | Refills: 0 | Status: COMPLETED | OUTPATIENT
Start: 2018-07-22 | End: 2018-07-22

## 2018-07-22 RX ORDER — MAGNESIUM SULFATE 500 MG/ML
1 VIAL (ML) INJECTION ONCE
Qty: 0 | Refills: 0 | Status: COMPLETED | OUTPATIENT
Start: 2018-07-22 | End: 2018-07-22

## 2018-07-22 RX ADMIN — NEOMYCIN SULFATE 1000 MILLIGRAM(S): 500 TABLET ORAL at 13:16

## 2018-07-22 RX ADMIN — Medication 1000 MILLIGRAM(S): at 13:17

## 2018-07-22 RX ADMIN — NEOMYCIN SULFATE 1000 MILLIGRAM(S): 500 TABLET ORAL at 22:37

## 2018-07-22 RX ADMIN — Medication 100 GRAM(S): at 16:40

## 2018-07-22 RX ADMIN — POLYETHYLENE GLYCOL 3350 17 GRAM(S): 17 POWDER, FOR SOLUTION ORAL at 20:19

## 2018-07-22 RX ADMIN — Medication 650 MILLIGRAM(S): at 16:48

## 2018-07-22 RX ADMIN — Medication 1000 MILLIGRAM(S): at 14:51

## 2018-07-22 RX ADMIN — Medication 20 MILLIEQUIVALENT(S): at 16:40

## 2018-07-22 RX ADMIN — NEOMYCIN SULFATE 1000 MILLIGRAM(S): 500 TABLET ORAL at 14:51

## 2018-07-22 RX ADMIN — Medication 650 MILLIGRAM(S): at 16:36

## 2018-07-22 RX ADMIN — Medication 1000 MILLIGRAM(S): at 22:38

## 2018-07-22 NOTE — PROGRESS NOTE ADULT - SUBJECTIVE AND OBJECTIVE BOX
SUBJECTIVE:   Having BMs, non bloody. Patient denies pain. Tolerating CLD.    ICU Vital Signs Last 24 Hrs  T(C): 36.8 (22 Jul 2018 20:06), Max: 36.8 (22 Jul 2018 20:06)  T(F): 98.2 (22 Jul 2018 20:06), Max: 98.2 (22 Jul 2018 20:06)  HR: 63 (22 Jul 2018 20:06) (63 - 77)  BP: 116/83 (22 Jul 2018 20:06) (107/63 - 126/87)  BP(mean): --  ABP: --  ABP(mean): --  RR: 17 (22 Jul 2018 20:06) (16 - 17)  SpO2: 100% (22 Jul 2018 20:06) (98% - 100%)    I&O's Detail    21 Jul 2018 07:01  -  22 Jul 2018 07:00  --------------------------------------------------------  IN:    Oral Fluid: 750 mL    sodium chloride 0.9%.: 2400 mL  Total IN: 3150 mL    OUT:    Voided: 2050 mL  Total OUT: 2050 mL    Total NET: 1100 mL      22 Jul 2018 07:01  -  22 Jul 2018 20:33  --------------------------------------------------------  IN:    Oral Fluid: 720 mL    sodium chloride 0.9%.: 1400 mL    Solution: 100 mL  Total IN: 2220 mL    OUT:    Voided: 2670 mL  Total OUT: 2670 mL    Total NET: -450 mL            General: NAD, resting comfortably in bed  Pulm: Nonlabored breathing, no respiratory distress  Abd: soft, NT/ND.  Extrem: WWP, no edema, SCDs in place                            10.2   4.4   )-----------( 166      ( 22 Jul 2018 07:15 )             31.7   07-22    141  |  106  |  6<L>  ----------------------------<  96  3.8   |  27  |  1.05    Ca    8.4      22 Jul 2018 07:15  Phos  3.0     07-22  Mg     1.7     07-22

## 2018-07-22 NOTE — PROGRESS NOTE ADULT - ASSESSMENT
Assessment:  28 year old male with history of Crohn's disease s/p ileocolic resection (1990 for intussusception) and SBR (2013) who presents with BRBPR x 1 day     Plan:  pain/nausea control  IS/OOB  CLD  erythro/neomycin, mechanical bowel prep in anticipation for OR tomorrow  holding home remicade, f/u with GI to restart  SCDs, holding HSQ

## 2018-07-23 ENCOUNTER — RESULT REVIEW (OUTPATIENT)
Age: 28
End: 2018-07-23

## 2018-07-23 LAB
ANION GAP SERPL CALC-SCNC: 13 MMOL/L — SIGNIFICANT CHANGE UP (ref 5–17)
APPEARANCE UR: CLEAR — SIGNIFICANT CHANGE UP
APTT BLD: 31.3 SEC — SIGNIFICANT CHANGE UP (ref 27.5–37.4)
BILIRUB UR-MCNC: NEGATIVE — SIGNIFICANT CHANGE UP
BUN SERPL-MCNC: 4 MG/DL — LOW (ref 7–23)
CALCIUM SERPL-MCNC: 8.4 MG/DL — SIGNIFICANT CHANGE UP (ref 8.4–10.5)
CHLORIDE SERPL-SCNC: 108 MMOL/L — SIGNIFICANT CHANGE UP (ref 96–108)
CO2 SERPL-SCNC: 22 MMOL/L — SIGNIFICANT CHANGE UP (ref 22–31)
COLOR SPEC: YELLOW — SIGNIFICANT CHANGE UP
CREAT SERPL-MCNC: 1.05 MG/DL — SIGNIFICANT CHANGE UP (ref 0.5–1.3)
DIFF PNL FLD: NEGATIVE — SIGNIFICANT CHANGE UP
GLUCOSE BLDC GLUCOMTR-MCNC: 147 MG/DL — HIGH (ref 70–99)
GLUCOSE SERPL-MCNC: 90 MG/DL — SIGNIFICANT CHANGE UP (ref 70–99)
GLUCOSE UR QL: NEGATIVE — SIGNIFICANT CHANGE UP
HCT VFR BLD CALC: 33.2 % — LOW (ref 39–50)
HGB BLD-MCNC: 10.5 G/DL — LOW (ref 13–17)
INR BLD: 1.16 — SIGNIFICANT CHANGE UP (ref 0.88–1.16)
KETONES UR-MCNC: ABNORMAL MG/DL
LEUKOCYTE ESTERASE UR-ACNC: NEGATIVE — SIGNIFICANT CHANGE UP
MAGNESIUM SERPL-MCNC: 1.9 MG/DL — SIGNIFICANT CHANGE UP (ref 1.6–2.6)
MCHC RBC-ENTMCNC: 26.3 PG — LOW (ref 27–34)
MCHC RBC-ENTMCNC: 31.6 G/DL — LOW (ref 32–36)
MCV RBC AUTO: 83.2 FL — SIGNIFICANT CHANGE UP (ref 80–100)
NITRITE UR-MCNC: NEGATIVE — SIGNIFICANT CHANGE UP
PH UR: 6 — SIGNIFICANT CHANGE UP (ref 5–8)
PHOSPHATE SERPL-MCNC: 3.2 MG/DL — SIGNIFICANT CHANGE UP (ref 2.5–4.5)
PLATELET # BLD AUTO: 162 K/UL — SIGNIFICANT CHANGE UP (ref 150–400)
POTASSIUM SERPL-MCNC: 3.6 MMOL/L — SIGNIFICANT CHANGE UP (ref 3.5–5.3)
POTASSIUM SERPL-SCNC: 3.6 MMOL/L — SIGNIFICANT CHANGE UP (ref 3.5–5.3)
PROT UR-MCNC: NEGATIVE MG/DL — SIGNIFICANT CHANGE UP
PROTHROM AB SERPL-ACNC: 12.9 SEC — HIGH (ref 9.8–12.7)
RBC # BLD: 3.99 M/UL — LOW (ref 4.2–5.8)
RBC # FLD: 20.9 % — HIGH (ref 10.3–16.9)
SODIUM SERPL-SCNC: 143 MMOL/L — SIGNIFICANT CHANGE UP (ref 135–145)
SP GR SPEC: 1.02 — SIGNIFICANT CHANGE UP (ref 1–1.03)
UROBILINOGEN FLD QL: 0.2 E.U./DL — SIGNIFICANT CHANGE UP
WBC # BLD: 5.1 K/UL — SIGNIFICANT CHANGE UP (ref 3.8–10.5)
WBC # FLD AUTO: 5.1 K/UL — SIGNIFICANT CHANGE UP (ref 3.8–10.5)

## 2018-07-23 RX ORDER — ACETAMINOPHEN 500 MG
1000 TABLET ORAL ONCE
Qty: 0 | Refills: 0 | Status: COMPLETED | OUTPATIENT
Start: 2018-07-23 | End: 2018-07-23

## 2018-07-23 RX ORDER — ONDANSETRON 8 MG/1
4 TABLET, FILM COATED ORAL EVERY 6 HOURS
Qty: 0 | Refills: 0 | Status: DISCONTINUED | OUTPATIENT
Start: 2018-07-23 | End: 2018-07-27

## 2018-07-23 RX ORDER — ACETAMINOPHEN 500 MG
1000 TABLET ORAL ONCE
Qty: 0 | Refills: 0 | Status: COMPLETED | OUTPATIENT
Start: 2018-07-24 | End: 2018-07-24

## 2018-07-23 RX ORDER — HYDROMORPHONE HYDROCHLORIDE 2 MG/ML
0.3 INJECTION INTRAMUSCULAR; INTRAVENOUS; SUBCUTANEOUS EVERY 4 HOURS
Qty: 0 | Refills: 0 | Status: DISCONTINUED | OUTPATIENT
Start: 2018-07-23 | End: 2018-07-25

## 2018-07-23 RX ORDER — ACETAMINOPHEN 500 MG
1000 TABLET ORAL ONCE
Qty: 0 | Refills: 0 | Status: DISCONTINUED | OUTPATIENT
Start: 2018-07-23 | End: 2018-07-23

## 2018-07-23 RX ORDER — MAGNESIUM SULFATE 500 MG/ML
1 VIAL (ML) INJECTION ONCE
Qty: 0 | Refills: 0 | Status: DISCONTINUED | OUTPATIENT
Start: 2018-07-23 | End: 2018-07-23

## 2018-07-23 RX ORDER — SODIUM CHLORIDE 9 MG/ML
1000 INJECTION, SOLUTION INTRAVENOUS
Qty: 0 | Refills: 0 | Status: DISCONTINUED | OUTPATIENT
Start: 2018-07-23 | End: 2018-07-25

## 2018-07-23 RX ORDER — HYDROMORPHONE HYDROCHLORIDE 2 MG/ML
0.5 INJECTION INTRAMUSCULAR; INTRAVENOUS; SUBCUTANEOUS EVERY 4 HOURS
Qty: 0 | Refills: 0 | Status: DISCONTINUED | OUTPATIENT
Start: 2018-07-23 | End: 2018-07-25

## 2018-07-23 RX ORDER — HEPARIN SODIUM 5000 [USP'U]/ML
5000 INJECTION INTRAVENOUS; SUBCUTANEOUS EVERY 8 HOURS
Qty: 0 | Refills: 0 | Status: DISCONTINUED | OUTPATIENT
Start: 2018-07-23 | End: 2018-07-27

## 2018-07-23 RX ORDER — HYDROMORPHONE HYDROCHLORIDE 2 MG/ML
0.5 INJECTION INTRAMUSCULAR; INTRAVENOUS; SUBCUTANEOUS
Qty: 0 | Refills: 0 | Status: DISCONTINUED | OUTPATIENT
Start: 2018-07-23 | End: 2018-07-25

## 2018-07-23 RX ORDER — POTASSIUM CHLORIDE 20 MEQ
10 PACKET (EA) ORAL
Qty: 0 | Refills: 0 | Status: COMPLETED | OUTPATIENT
Start: 2018-07-23 | End: 2018-07-23

## 2018-07-23 RX ADMIN — HYDROMORPHONE HYDROCHLORIDE 0.5 MILLIGRAM(S): 2 INJECTION INTRAMUSCULAR; INTRAVENOUS; SUBCUTANEOUS at 19:37

## 2018-07-23 RX ADMIN — Medication 100 MILLIEQUIVALENT(S): at 10:27

## 2018-07-23 RX ADMIN — HEPARIN SODIUM 5000 UNIT(S): 5000 INJECTION INTRAVENOUS; SUBCUTANEOUS at 22:03

## 2018-07-23 RX ADMIN — HYDROMORPHONE HYDROCHLORIDE 0.5 MILLIGRAM(S): 2 INJECTION INTRAMUSCULAR; INTRAVENOUS; SUBCUTANEOUS at 20:28

## 2018-07-23 RX ADMIN — HYDROMORPHONE HYDROCHLORIDE 0.5 MILLIGRAM(S): 2 INJECTION INTRAMUSCULAR; INTRAVENOUS; SUBCUTANEOUS at 19:56

## 2018-07-23 RX ADMIN — Medication 650 MILLIGRAM(S): at 01:11

## 2018-07-23 RX ADMIN — Medication 1000 MILLIGRAM(S): at 22:45

## 2018-07-23 RX ADMIN — Medication 100 MILLIEQUIVALENT(S): at 12:07

## 2018-07-23 RX ADMIN — Medication 400 MILLIGRAM(S): at 22:03

## 2018-07-23 RX ADMIN — Medication 100 MILLIEQUIVALENT(S): at 13:43

## 2018-07-23 RX ADMIN — Medication 650 MILLIGRAM(S): at 02:11

## 2018-07-23 RX ADMIN — HYDROMORPHONE HYDROCHLORIDE 0.5 MILLIGRAM(S): 2 INJECTION INTRAMUSCULAR; INTRAVENOUS; SUBCUTANEOUS at 19:35

## 2018-07-23 NOTE — PROGRESS NOTE ADULT - ASSESSMENT
28 year old male with history of Crohn's disease s/p ileocolic resection (1990 for intussusception) and SBR (2013) who presents with BRBPR x 1 day     Plan:  pain/nausea control  IS/OOB  CLD  erythro/neomycin, mechanical bowel prep yesterday for OR today  holding home remicade, f/u with GI to restart  SCDs, holding HSQ

## 2018-07-23 NOTE — BRIEF OPERATIVE NOTE - OPERATION/FINDINGS
dense omental adhesions to anterior abdominal wall lysed  edematous prior ileocolic anastomosis  Mobilized anastomotic site and exteriorized  Prior anastomosis resected and performed a primary side to side function anastomosis with MARGARITA anastomosis  Omental patch over anastomosis  Fascia closed primarily with looped PDS 0 suture

## 2018-07-23 NOTE — BRIEF OPERATIVE NOTE - POST-OP DX
Crohn's disease of both small and large intestine with rectal bleeding  07/23/2018    Active  Gurpreet Bhardwaj

## 2018-07-23 NOTE — PROGRESS NOTE ADULT - SUBJECTIVE AND OBJECTIVE BOX
POST-OPERATIVE NOTE    Procedure: Laparoscopic resection of single segment of small intestine with anastomosis     Diagnosis/Indication: Crohn's disease of both small and large intestine with rectal bleeding      Surgeon: Dr. Boo    S: Pt has no complaints. Denies CP, SOB, EVANGELISTA, calf tenderness. Pain controlled with medication. Denies nausea, vomiting.    O:  T(C): 36.6 (07-23-18 @ 20:00), Max: 36.6 (07-23-18 @ 18:20)  T(F): 97.9 (07-23-18 @ 20:00), Max: 97.9 (07-23-18 @ 20:00)  HR: 88 (07-23-18 @ 20:00) (56 - 92)  BP: 137/79 (07-23-18 @ 20:00) (129/73 - 146/82)  RR: 16 (07-23-18 @ 20:00) (16 - 22)  SpO2: 100% (07-23-18 @ 20:00) (100% - 100%)  Wt(kg): --                        10.5   5.1   )-----------( 162      ( 23 Jul 2018 06:22 )             33.2     07-23    143  |  108  |  4<L>  ----------------------------<  90  3.6   |  22  |  1.05    Ca    8.4      23 Jul 2018 06:22  Phos  3.2     07-23  Mg     1.9     07-23    Gen: NAD, resting comfortably in bed  C/V: NSR  Pulm: Nonlabored breathing, no respiratory distress  Abd: soft, NT/ND, incision areas are clean, dry and intact  Extrem: WWP, no calf edema or tenderness, SCDs in place      A/P: 28y Male s/p above procedure  Diet: NPO  IVF: LR @ 100  Pain/nausea control  SQH/SCDs/OOBA/IS  Dispo pending pain control, PO tolerance, clinical improvement

## 2018-07-23 NOTE — BRIEF OPERATIVE NOTE - PROCEDURE
<<-----Click on this checkbox to enter Procedure Laparoscopic resection of single segment of small intestine with anastomosis  07/23/2018    Active  KISHOR

## 2018-07-23 NOTE — PROGRESS NOTE ADULT - SUBJECTIVE AND OBJECTIVE BOX
SUBJECTIVE: Patient seen and examined bedside by chief resident.  Pt denies pain. No acute events overnight. Denies fever, chills, nausea, emesis, chest pain, dyspnea        Vital Signs Last 24 Hrs  T(C): 36.5 (2018 05:52), Max: 36.8 (2018 20:06)  T(F): 97.7 (2018 05:52), Max: 98.2 (2018 20:06)  HR: 58 (2018 05:52) (57 - 77)  BP: 100/62 (2018 05:52) (100/62 - 133/76)  BP(mean): --  RR: 17 (2018 05:52) (16 - 17)  SpO2: 100% (2018 05:52) (100% - 100%)  I&O's Detail    2018 07:01  -  2018 07:00  --------------------------------------------------------  IN:    Oral Fluid: 960 mL    sodium chloride 0.9%.: 2300 mL    Solution: 100 mL  Total IN: 3360 mL    OUT:    Voided: 2970 mL  Total OUT: 2970 mL    Total NET: 390 mL          General: NAD, resting comfortably in bed  C/V: NSR  Pulm: Nonlabored breathing, no respiratory distress  Abd: soft, NT/ND.  Extrem: WWP, no edema, SCDs in place        LABS:                        10.5   5.1   )-----------( 162      ( 2018 06:22 )             33.2     07-23    143  |  108  |  4<L>  ----------------------------<  90  3.6   |  22  |  1.05    Ca    8.4      2018 06:22  Phos  3.2     07-23  Mg     1.9     07-23      PT/INR - ( 2018 06:22 )   PT: 12.9 sec;   INR: 1.16          PTT - ( 2018 06:22 )  PTT:31.3 sec  Urinalysis Basic - ( 2018 05:41 )    Color: Yellow / Appearance: Clear / S.020 / pH: x  Gluc: x / Ketone: Trace mg/dL  / Bili: Negative / Urobili: 0.2 E.U./dL   Blood: x / Protein: NEGATIVE mg/dL / Nitrite: NEGATIVE   Leuk Esterase: NEGATIVE / RBC: x / WBC x   Sq Epi: x / Non Sq Epi: x / Bacteria: x

## 2018-07-23 NOTE — PROGRESS NOTE ADULT - SUBJECTIVE AND OBJECTIVE BOX
prep complete  consent signed  AVSS  H&H stable  ABD: soft    risks bleeding, infection, anastomatic, leak, ileostomy discussed

## 2018-07-23 NOTE — PROGRESS NOTE ADULT - SUBJECTIVE AND OBJECTIVE BOX
PRE-OP NOTE:    Pre-op diagnosis: GI Bleed  Planned procedure:    Surgeon: Dr. Boo    HPI:  28 year old male with history of Crohn' s disease s/p ileocolic resection (1990 for intussusception) and SBR (2013) who presents with LGIB x 1 day. Patient states that he had three episodes of bright red blood per rectum. The patient was recently admitted to the surgical service in April of this year for a similar chief complaint at which time colonoscopy demonstrated an ulcer at the ileocolic resection but no active source of bleeding. Outpatient CT enterography demonstrated diffuse inflammation consistent with Crohn's disease.   Patient currently denies nausea / vomiting / fevers / chills / dysuria. (20 Jul 2018 02:10)      Labs:                         10.2   4.4   )-----------( 166      ( 22 Jul 2018 07:15 )             31.7     07-22    141  |  106  |  6<L>  ----------------------------<  96  3.8   |  27  |  1.05    Ca    8.4      22 Jul 2018 07:15  Phos  3.0     07-22  Mg     1.7     07-22    Urinalysis: pending am collection    T&S x 2: pending am collection      A/P: 28yMale to OR   -NPO for OR, IVF  -Operative consent   -2U PRBCs on hold if necessary   -Pain/nausea control  -SQH, SCDs, OOB/A PRE-OP NOTE:    Pre-op diagnosis: GI Bleed  Planned procedure:  Small bowel resection  Surgeon: Dr. Boo    HPI:  28 year old male with history of Crohn' s disease s/p ileocolic resection (1990 for intussusception) and SBR (2013) who presents with LGIB x 1 day. Patient states that he had three episodes of bright red blood per rectum. The patient was recently admitted to the surgical service in April of this year for a similar chief complaint at which time colonoscopy demonstrated an ulcer at the ileocolic resection but no active source of bleeding. Outpatient CT enterography demonstrated diffuse inflammation consistent with Crohn's disease.   Patient currently denies nausea / vomiting / fevers / chills / dysuria. (20 Jul 2018 02:10)      Labs:                         10.2   4.4   )-----------( 166      ( 22 Jul 2018 07:15 )             31.7     07-22    141  |  106  |  6<L>  ----------------------------<  96  3.8   |  27  |  1.05    Ca    8.4      22 Jul 2018 07:15  Phos  3.0     07-22  Mg     1.7     07-22    Urinalysis: pending am collection    T&S x 2: pending am collection      A/P: 28yMale to OR   -NPO for OR, IVF  -Operative consent   -2U PRBCs on hold if necessary   -Pain/nausea control  -SQH, SCDs, OOB/A

## 2018-07-23 NOTE — PACU DISCHARGE NOTE - COMMENTS
pt aao x3.  VSS.  abd dsg c/d/i; left abd dsgs x2 c/d/i.  murillo to bsd.  denies c/o pain at present.  report given to RN on 9 uris.  pt to go to 14-1 via bed with transport

## 2018-07-24 LAB
ANION GAP SERPL CALC-SCNC: 13 MMOL/L — SIGNIFICANT CHANGE UP (ref 5–17)
BUN SERPL-MCNC: 4 MG/DL — LOW (ref 7–23)
CALCIUM SERPL-MCNC: 9.8 MG/DL — SIGNIFICANT CHANGE UP (ref 8.4–10.5)
CHLORIDE SERPL-SCNC: 99 MMOL/L — SIGNIFICANT CHANGE UP (ref 96–108)
CO2 SERPL-SCNC: 27 MMOL/L — SIGNIFICANT CHANGE UP (ref 22–31)
CREAT SERPL-MCNC: 1.02 MG/DL — SIGNIFICANT CHANGE UP (ref 0.5–1.3)
GLUCOSE SERPL-MCNC: 115 MG/DL — HIGH (ref 70–99)
HCT VFR BLD CALC: 33.4 % — LOW (ref 39–50)
HGB BLD-MCNC: 11.2 G/DL — LOW (ref 13–17)
MAGNESIUM SERPL-MCNC: 1.6 MG/DL — SIGNIFICANT CHANGE UP (ref 1.6–2.6)
MCHC RBC-ENTMCNC: 26.6 PG — LOW (ref 27–34)
MCHC RBC-ENTMCNC: 33.5 G/DL — SIGNIFICANT CHANGE UP (ref 32–36)
MCV RBC AUTO: 79.3 FL — LOW (ref 80–100)
PHOSPHATE SERPL-MCNC: 4.9 MG/DL — HIGH (ref 2.5–4.5)
PLATELET # BLD AUTO: 237 K/UL — SIGNIFICANT CHANGE UP (ref 150–400)
POTASSIUM SERPL-MCNC: 4.4 MMOL/L — SIGNIFICANT CHANGE UP (ref 3.5–5.3)
POTASSIUM SERPL-SCNC: 4.4 MMOL/L — SIGNIFICANT CHANGE UP (ref 3.5–5.3)
RBC # BLD: 4.21 M/UL — SIGNIFICANT CHANGE UP (ref 4.2–5.8)
RBC # FLD: 20.1 % — HIGH (ref 10.3–16.9)
SODIUM SERPL-SCNC: 139 MMOL/L — SIGNIFICANT CHANGE UP (ref 135–145)
WBC # BLD: 12 K/UL — HIGH (ref 3.8–10.5)
WBC # FLD AUTO: 12 K/UL — HIGH (ref 3.8–10.5)

## 2018-07-24 RX ORDER — MAGNESIUM SULFATE 500 MG/ML
2 VIAL (ML) INJECTION EVERY 6 HOURS
Qty: 0 | Refills: 0 | Status: COMPLETED | OUTPATIENT
Start: 2018-07-24 | End: 2018-07-24

## 2018-07-24 RX ORDER — ACETAMINOPHEN 500 MG
1000 TABLET ORAL ONCE
Qty: 0 | Refills: 0 | Status: COMPLETED | OUTPATIENT
Start: 2018-07-24 | End: 2018-07-24

## 2018-07-24 RX ADMIN — HYDROMORPHONE HYDROCHLORIDE 0.3 MILLIGRAM(S): 2 INJECTION INTRAMUSCULAR; INTRAVENOUS; SUBCUTANEOUS at 13:39

## 2018-07-24 RX ADMIN — Medication 400 MILLIGRAM(S): at 12:44

## 2018-07-24 RX ADMIN — HYDROMORPHONE HYDROCHLORIDE 0.5 MILLIGRAM(S): 2 INJECTION INTRAMUSCULAR; INTRAVENOUS; SUBCUTANEOUS at 18:07

## 2018-07-24 RX ADMIN — HYDROMORPHONE HYDROCHLORIDE 0.5 MILLIGRAM(S): 2 INJECTION INTRAMUSCULAR; INTRAVENOUS; SUBCUTANEOUS at 06:14

## 2018-07-24 RX ADMIN — HEPARIN SODIUM 5000 UNIT(S): 5000 INJECTION INTRAVENOUS; SUBCUTANEOUS at 13:38

## 2018-07-24 RX ADMIN — HEPARIN SODIUM 5000 UNIT(S): 5000 INJECTION INTRAVENOUS; SUBCUTANEOUS at 05:32

## 2018-07-24 RX ADMIN — Medication 50 GRAM(S): at 11:29

## 2018-07-24 RX ADMIN — HYDROMORPHONE HYDROCHLORIDE 0.5 MILLIGRAM(S): 2 INJECTION INTRAMUSCULAR; INTRAVENOUS; SUBCUTANEOUS at 05:33

## 2018-07-24 RX ADMIN — HYDROMORPHONE HYDROCHLORIDE 0.5 MILLIGRAM(S): 2 INJECTION INTRAMUSCULAR; INTRAVENOUS; SUBCUTANEOUS at 22:21

## 2018-07-24 RX ADMIN — Medication 1000 MILLIGRAM(S): at 04:45

## 2018-07-24 RX ADMIN — HYDROMORPHONE HYDROCHLORIDE 0.5 MILLIGRAM(S): 2 INJECTION INTRAMUSCULAR; INTRAVENOUS; SUBCUTANEOUS at 01:00

## 2018-07-24 RX ADMIN — HYDROMORPHONE HYDROCHLORIDE 0.5 MILLIGRAM(S): 2 INJECTION INTRAMUSCULAR; INTRAVENOUS; SUBCUTANEOUS at 10:00

## 2018-07-24 RX ADMIN — HYDROMORPHONE HYDROCHLORIDE 0.5 MILLIGRAM(S): 2 INJECTION INTRAMUSCULAR; INTRAVENOUS; SUBCUTANEOUS at 00:37

## 2018-07-24 RX ADMIN — HYDROMORPHONE HYDROCHLORIDE 0.5 MILLIGRAM(S): 2 INJECTION INTRAMUSCULAR; INTRAVENOUS; SUBCUTANEOUS at 09:37

## 2018-07-24 RX ADMIN — Medication 400 MILLIGRAM(S): at 03:45

## 2018-07-24 RX ADMIN — HYDROMORPHONE HYDROCHLORIDE 0.5 MILLIGRAM(S): 2 INJECTION INTRAMUSCULAR; INTRAVENOUS; SUBCUTANEOUS at 22:50

## 2018-07-24 RX ADMIN — HYDROMORPHONE HYDROCHLORIDE 0.5 MILLIGRAM(S): 2 INJECTION INTRAMUSCULAR; INTRAVENOUS; SUBCUTANEOUS at 18:40

## 2018-07-24 RX ADMIN — Medication 1000 MILLIGRAM(S): at 13:00

## 2018-07-24 RX ADMIN — HEPARIN SODIUM 5000 UNIT(S): 5000 INJECTION INTRAVENOUS; SUBCUTANEOUS at 22:21

## 2018-07-24 RX ADMIN — Medication 50 GRAM(S): at 18:07

## 2018-07-24 NOTE — PROGRESS NOTE ADULT - ASSESSMENT
28 year old male with history of Crohn's disease s/p ileocolic resection (1990 for intussusception) and SBR (2013) POD1 laparoscopic resection of single segment of small intestine with anastomosis, doing well.     D/C chidi, TOSHAYAN ANDRADE, pulm toilet, IS  CLD

## 2018-07-24 NOTE — PROGRESS NOTE ADULT - SUBJECTIVE AND OBJECTIVE BOX
S: Patient admits to mild pain surrounding incision site. He denies nausea/vomiting. He denies passing flatus/BM.     O:     Vital Signs Last 24 Hrs  T(C): 37.3 (24 Jul 2018 09:58), Max: 37.3 (24 Jul 2018 09:58)  T(F): 99.1 (24 Jul 2018 09:58), Max: 99.1 (24 Jul 2018 09:58)  HR: 82 (24 Jul 2018 09:58) (56 - 99)  BP: 122/68 (24 Jul 2018 09:58) (110/73 - 153/81)  BP(mean): 95 (23 Jul 2018 21:00) (93 - 105)  RR: 17 (24 Jul 2018 09:58) (14 - 22)  SpO2: 98% (24 Jul 2018 09:58) (98% - 100%)  I&O's Summary    23 Jul 2018 07:01  -  24 Jul 2018 07:00  --------------------------------------------------------  IN: 900 mL / OUT: 3325 mL / NET: -2425 mL        Gen - NAD  Resp - Normal respiratory effot  GI - abd soft, appropriately TTP surrounding incision, incision TTP, non-distended                          11.2   12.0  )-----------( 237      ( 24 Jul 2018 07:37 )             33.4   07-24    139  |  99  |  4<L>  ----------------------------<  115<H>  4.4   |  27  |  1.02    Ca    9.8      24 Jul 2018 07:37  Phos  4.9     07-24  Mg     1.6     07-24

## 2018-07-24 NOTE — PROGRESS NOTE ADULT - SUBJECTIVE AND OBJECTIVE BOX
POD#1  No nausea  AVSS  Excellent UO  Abd: soft  Labs OK    D/C murillo  OOB  Pulmonary toilet  Clear liquids

## 2018-07-25 LAB
ANION GAP SERPL CALC-SCNC: 10 MMOL/L — SIGNIFICANT CHANGE UP (ref 5–17)
BUN SERPL-MCNC: 3 MG/DL — LOW (ref 7–23)
CALCIUM SERPL-MCNC: 9.2 MG/DL — SIGNIFICANT CHANGE UP (ref 8.4–10.5)
CHLORIDE SERPL-SCNC: 100 MMOL/L — SIGNIFICANT CHANGE UP (ref 96–108)
CO2 SERPL-SCNC: 29 MMOL/L — SIGNIFICANT CHANGE UP (ref 22–31)
CREAT SERPL-MCNC: 0.91 MG/DL — SIGNIFICANT CHANGE UP (ref 0.5–1.3)
GLUCOSE SERPL-MCNC: 119 MG/DL — HIGH (ref 70–99)
HCT VFR BLD CALC: 31.9 % — LOW (ref 39–50)
HGB BLD-MCNC: 10.6 G/DL — LOW (ref 13–17)
MAGNESIUM SERPL-MCNC: 1.9 MG/DL — SIGNIFICANT CHANGE UP (ref 1.6–2.6)
MCHC RBC-ENTMCNC: 26.8 PG — LOW (ref 27–34)
MCHC RBC-ENTMCNC: 33.2 G/DL — SIGNIFICANT CHANGE UP (ref 32–36)
MCV RBC AUTO: 80.8 FL — SIGNIFICANT CHANGE UP (ref 80–100)
PHOSPHATE SERPL-MCNC: 2.5 MG/DL — SIGNIFICANT CHANGE UP (ref 2.5–4.5)
PLATELET # BLD AUTO: 217 K/UL — SIGNIFICANT CHANGE UP (ref 150–400)
POTASSIUM SERPL-MCNC: 3.4 MMOL/L — LOW (ref 3.5–5.3)
POTASSIUM SERPL-SCNC: 3.4 MMOL/L — LOW (ref 3.5–5.3)
RBC # BLD: 3.95 M/UL — LOW (ref 4.2–5.8)
RBC # FLD: 19.9 % — HIGH (ref 10.3–16.9)
SODIUM SERPL-SCNC: 139 MMOL/L — SIGNIFICANT CHANGE UP (ref 135–145)
WBC # BLD: 7.8 K/UL — SIGNIFICANT CHANGE UP (ref 3.8–10.5)
WBC # FLD AUTO: 7.8 K/UL — SIGNIFICANT CHANGE UP (ref 3.8–10.5)

## 2018-07-25 PROCEDURE — 99231 SBSQ HOSP IP/OBS SF/LOW 25: CPT | Mod: GC

## 2018-07-25 RX ORDER — HYDROMORPHONE HYDROCHLORIDE 2 MG/ML
0.5 INJECTION INTRAMUSCULAR; INTRAVENOUS; SUBCUTANEOUS EVERY 4 HOURS
Qty: 0 | Refills: 0 | Status: DISCONTINUED | OUTPATIENT
Start: 2018-07-25 | End: 2018-07-25

## 2018-07-25 RX ORDER — OXYCODONE AND ACETAMINOPHEN 5; 325 MG/1; MG/1
2 TABLET ORAL EVERY 4 HOURS
Qty: 0 | Refills: 0 | Status: DISCONTINUED | OUTPATIENT
Start: 2018-07-25 | End: 2018-07-27

## 2018-07-25 RX ORDER — HYDROMORPHONE HYDROCHLORIDE 2 MG/ML
0.5 INJECTION INTRAMUSCULAR; INTRAVENOUS; SUBCUTANEOUS EVERY 4 HOURS
Qty: 0 | Refills: 0 | Status: DISCONTINUED | OUTPATIENT
Start: 2018-07-25 | End: 2018-07-27

## 2018-07-25 RX ORDER — OXYCODONE AND ACETAMINOPHEN 5; 325 MG/1; MG/1
1 TABLET ORAL EVERY 4 HOURS
Qty: 0 | Refills: 0 | Status: DISCONTINUED | OUTPATIENT
Start: 2018-07-25 | End: 2018-07-27

## 2018-07-25 RX ORDER — POTASSIUM PHOSPHATE, MONOBASIC POTASSIUM PHOSPHATE, DIBASIC 236; 224 MG/ML; MG/ML
21 INJECTION, SOLUTION INTRAVENOUS ONCE
Qty: 0 | Refills: 0 | Status: COMPLETED | OUTPATIENT
Start: 2018-07-25 | End: 2018-07-25

## 2018-07-25 RX ORDER — SODIUM CHLORIDE 9 MG/ML
1000 INJECTION, SOLUTION INTRAVENOUS
Qty: 0 | Refills: 0 | Status: DISCONTINUED | OUTPATIENT
Start: 2018-07-25 | End: 2018-07-25

## 2018-07-25 RX ORDER — POTASSIUM CHLORIDE 20 MEQ
40 PACKET (EA) ORAL ONCE
Qty: 0 | Refills: 0 | Status: COMPLETED | OUTPATIENT
Start: 2018-07-25 | End: 2018-07-25

## 2018-07-25 RX ORDER — ACETAMINOPHEN 500 MG
1000 TABLET ORAL ONCE
Qty: 0 | Refills: 0 | Status: COMPLETED | OUTPATIENT
Start: 2018-07-25 | End: 2018-07-25

## 2018-07-25 RX ADMIN — HYDROMORPHONE HYDROCHLORIDE 0.5 MILLIGRAM(S): 2 INJECTION INTRAMUSCULAR; INTRAVENOUS; SUBCUTANEOUS at 02:24

## 2018-07-25 RX ADMIN — HEPARIN SODIUM 5000 UNIT(S): 5000 INJECTION INTRAVENOUS; SUBCUTANEOUS at 06:13

## 2018-07-25 RX ADMIN — Medication 40 MILLIEQUIVALENT(S): at 11:25

## 2018-07-25 RX ADMIN — OXYCODONE AND ACETAMINOPHEN 1 TABLET(S): 5; 325 TABLET ORAL at 11:25

## 2018-07-25 RX ADMIN — HEPARIN SODIUM 5000 UNIT(S): 5000 INJECTION INTRAVENOUS; SUBCUTANEOUS at 13:30

## 2018-07-25 RX ADMIN — POTASSIUM PHOSPHATE, MONOBASIC POTASSIUM PHOSPHATE, DIBASIC 64.25 MILLIMOLE(S): 236; 224 INJECTION, SOLUTION INTRAVENOUS at 11:25

## 2018-07-25 RX ADMIN — HEPARIN SODIUM 5000 UNIT(S): 5000 INJECTION INTRAVENOUS; SUBCUTANEOUS at 21:31

## 2018-07-25 RX ADMIN — HYDROMORPHONE HYDROCHLORIDE 0.5 MILLIGRAM(S): 2 INJECTION INTRAMUSCULAR; INTRAVENOUS; SUBCUTANEOUS at 06:17

## 2018-07-25 RX ADMIN — Medication 400 MILLIGRAM(S): at 05:08

## 2018-07-25 RX ADMIN — OXYCODONE AND ACETAMINOPHEN 2 TABLET(S): 5; 325 TABLET ORAL at 16:35

## 2018-07-25 RX ADMIN — HYDROMORPHONE HYDROCHLORIDE 0.5 MILLIGRAM(S): 2 INJECTION INTRAMUSCULAR; INTRAVENOUS; SUBCUTANEOUS at 03:00

## 2018-07-25 RX ADMIN — OXYCODONE AND ACETAMINOPHEN 1 TABLET(S): 5; 325 TABLET ORAL at 11:55

## 2018-07-25 RX ADMIN — Medication 1000 MILLIGRAM(S): at 05:36

## 2018-07-25 RX ADMIN — HYDROMORPHONE HYDROCHLORIDE 0.5 MILLIGRAM(S): 2 INJECTION INTRAMUSCULAR; INTRAVENOUS; SUBCUTANEOUS at 07:09

## 2018-07-25 NOTE — PROGRESS NOTE ADULT - SUBJECTIVE AND OBJECTIVE BOX
Pt seen and examined at bedside today. POD#2 s/p side to side anastamosis with take down of adhesions. Pt denies any further bleeding. Denies flatus or BMs.    REVIEW OF SYSTEMS:  Constitutional: No fever, weight loss or fatigue  Cardiovascular: No chest pain, palpitations, dizziness or leg swelling  Gastrointestinal: No abdominal or epigastric pain. No nausea, vomiting or hematemesis; No diarrhea or constipation. No melena or hematochezia.  Skin: No itching, burning, rashes or lesions       MEDICATIONS:  MEDICATIONS  (STANDING):  heparin  Injectable 5000 Unit(s) SubCutaneous every 8 hours    MEDICATIONS  (PRN):  HYDROmorphone  Injectable 0.5 milliGRAM(s) IV Push every 4 hours PRN Severe Pain (7 - 10)  ondansetron Injectable 4 milliGRAM(s) IV Push every 6 hours PRN Nausea  oxyCODONE    5 mG/acetaminophen 325 mG 1 Tablet(s) Oral every 4 hours PRN Moderate Pain (4 - 6)  oxyCODONE    5 mG/acetaminophen 325 mG 2 Tablet(s) Oral every 4 hours PRN Severe Pain (7 - 10)      Allergies    No Known Allergies    Intolerances        Vital Signs Last 24 Hrs  T(C): 37 (25 Jul 2018 13:11), Max: 37.3 (25 Jul 2018 06:18)  T(F): 98.6 (25 Jul 2018 13:11), Max: 99.2 (25 Jul 2018 06:18)  HR: 71 (25 Jul 2018 13:11) (70 - 79)  BP: 125/80 (25 Jul 2018 13:11) (125/80 - 147/80)  BP(mean): --  RR: 17 (25 Jul 2018 13:11) (16 - 17)  SpO2: 97% (25 Jul 2018 13:11) (97% - 100%)    07-24 @ 07:01  -  07-25 @ 07:00  --------------------------------------------------------  IN: 1220 mL / OUT: 2470 mL / NET: -1250 mL    07-25 @ 07:01 - 07-25 @ 17:35  --------------------------------------------------------  IN: 600 mL / OUT: 850 mL / NET: -250 mL        PHYSICAL EXAM:    General: Well developed; well nourished; in no acute distress  HEENT: MMM, conjunctiva and sclera clear  Gastrointestinal: Soft non-tender non-distended; incision site c/d/i    LABS:      CBC Full  -  ( 25 Jul 2018 07:45 )  WBC Count : 7.8 K/uL  Hemoglobin : 10.6 g/dL  Hematocrit : 31.9 %  Platelet Count - Automated : 217 K/uL  Mean Cell Volume : 80.8 fL  Mean Cell Hemoglobin : 26.8 pg  Mean Cell Hemoglobin Concentration : 33.2 g/dL  Auto Neutrophil # : x  Auto Lymphocyte # : x  Auto Monocyte # : x  Auto Eosinophil # : x  Auto Basophil # : x  Auto Neutrophil % : x  Auto Lymphocyte % : x  Auto Monocyte % : x  Auto Eosinophil % : x  Auto Basophil % : x    07-25    139  |  100  |  3<L>  ----------------------------<  119<H>  3.4<L>   |  29  |  0.91    Ca    9.2      25 Jul 2018 07:45  Phos  2.5     07-25  Mg     1.9     07-25                        RADIOLOGY & ADDITIONAL STUDIES (The following images were personally reviewed): Pt seen and examined at bedside today. POD#2 s/p side to side anastamosis with take down of adhesions. Pt denies any further bleeding. Denies flatus or BMs.      MEDICATIONS:  MEDICATIONS  (STANDING):  heparin  Injectable 5000 Unit(s) SubCutaneous every 8 hours    MEDICATIONS  (PRN):  HYDROmorphone  Injectable 0.5 milliGRAM(s) IV Push every 4 hours PRN Severe Pain (7 - 10)  ondansetron Injectable 4 milliGRAM(s) IV Push every 6 hours PRN Nausea  oxyCODONE    5 mG/acetaminophen 325 mG 1 Tablet(s) Oral every 4 hours PRN Moderate Pain (4 - 6)  oxyCODONE    5 mG/acetaminophen 325 mG 2 Tablet(s) Oral every 4 hours PRN Severe Pain (7 - 10)      Allergies    No Known Allergies    Intolerances        Vital Signs Last 24 Hrs  T(C): 37 (25 Jul 2018 13:11), Max: 37.3 (25 Jul 2018 06:18)  T(F): 98.6 (25 Jul 2018 13:11), Max: 99.2 (25 Jul 2018 06:18)  HR: 71 (25 Jul 2018 13:11) (70 - 79)  BP: 125/80 (25 Jul 2018 13:11) (125/80 - 147/80)  BP(mean): --  RR: 17 (25 Jul 2018 13:11) (16 - 17)  SpO2: 97% (25 Jul 2018 13:11) (97% - 100%)    07-24 @ 07:01 - 07-25 @ 07:00  --------------------------------------------------------  IN: 1220 mL / OUT: 2470 mL / NET: -1250 mL    07-25 @ 07:01 - 07-25 @ 17:35  --------------------------------------------------------  IN: 600 mL / OUT: 850 mL / NET: -250 mL        PHYSICAL EXAM:    General: Well developed; well nourished; in no acute distress  HEENT: MMM, conjunctiva and sclera clear  Gastrointestinal: Soft non-tender non-distended; bs+ incision site c/d/i    LABS:      CBC Full  -  ( 25 Jul 2018 07:45 )  WBC Count : 7.8 K/uL  Hemoglobin : 10.6 g/dL  Hematocrit : 31.9 %  Platelet Count - Automated : 217 K/uL  Mean Cell Volume : 80.8 fL  Mean Cell Hemoglobin : 26.8 pg  Mean Cell Hemoglobin Concentration : 33.2 g/dL  Auto Neutrophil # : x  Auto Lymphocyte # : x  Auto Monocyte # : x  Auto Eosinophil # : x  Auto Basophil # : x  Auto Neutrophil % : x  Auto Lymphocyte % : x  Auto Monocyte % : x  Auto Eosinophil % : x  Auto Basophil % : x    07-25    139  |  100  |  3<L>  ----------------------------<  119<H>  3.4<L>   |  29  |  0.91    Ca    9.2      25 Jul 2018 07:45  Phos  2.5     07-25  Mg     1.9     07-25                        RADIOLOGY & ADDITIONAL STUDIES (The following images were personally reviewed):

## 2018-07-25 NOTE — PROGRESS NOTE ADULT - ASSESSMENT
28 year old male with history of Crohn' s disease s/p ileocolic resection (1990 for intussusception) and SBR (2013) with recent admission for LGIB s/p colonoscopy demonstrating ileocolonic anastamotic ulcer without active bleeding who was admitted for repeat episode bleeding. Colonoscopy demonstrated anastamotic ulcer. Pt is POD #2 for side to side anastamosis with resection of the anastamotic ulcer site.     #2/2 anastomotic ulcer  - POD#2 with surgical intervention   - s/p colonoscopy showing anastomotic ulcer with e/o recent bleeding and scattered apthae in the galo-TI  - Hgb stable   - Clear liquid diet, advance as tolerated  - Monitor H/H, transfuse if Hgb <7    #Crohn's Dz  - last IFx loading dose 7/5 -- due for next loading 8/2  - Will plan resume ifx infusion (at 10mg/kg) once discharged and cleared from a surgical perspective.     GI will follow     Case d/w Dr. Hannah 28 year old male with history of Crohn' s disease s/p ileocolic resection (1990 for intussusception) and SBR (2013) with recent admission for LGIB s/p colonoscopy demonstrating ileocolonic anastamotic ulcer without active bleeding who was admitted for repeat episode bleeding. Colonoscopy demonstrated anastamotic ulcer. Pt is POD #2 for side to side anastamosis with resection of the anastamotic ulcer site and re-anastamosis.    #2/2 anastomotic ulcer  - POD#2 with surgical intervention   - s/p colonoscopy showing anastomotic ulcer with e/o recent bleeding and scattered apthae in the galo-TI  - Hgb stable   - Clear liquid diet, advance as tolerated  - Monitor H/H, transfuse if Hgb <7  - Minimize narcotics as it can slow gut motility  - out of bed to chair   - encourage diet as tolerated to help activate bowels     #Crohn's Dz  - last IFx loading dose 7/5 -- due for next loading 8/2  - Will plan resume ifx infusion (at 10mg/kg) once discharged and cleared from a surgical perspective.   - DVT ppx as per surgery    GI will follow     Case d/w Dr. Hannah

## 2018-07-25 NOTE — PROGRESS NOTE ADULT - SUBJECTIVE AND OBJECTIVE BOX
SUBJECTIVE: Patient seen and examined bedside by chief resident.  pt denies flatus or BM. awoke to mild pain overnight. is willing to try oral pain meds. denies n/v/d sob, cp    heparin  Injectable 5000 Unit(s) SubCutaneous every 8 hours      Vital Signs Last 24 Hrs  T(C): 37.3 (25 Jul 2018 06:18), Max: 37.3 (24 Jul 2018 09:58)  T(F): 99.2 (25 Jul 2018 06:18), Max: 99.2 (25 Jul 2018 06:18)  HR: 70 (25 Jul 2018 06:18) (70 - 82)  BP: 147/80 (25 Jul 2018 06:18) (122/68 - 147/84)  BP(mean): --  RR: 16 (25 Jul 2018 06:18) (16 - 18)  SpO2: 100% (25 Jul 2018 06:18) (98% - 100%)  I&O's Detail    24 Jul 2018 07:01  -  25 Jul 2018 07:00  --------------------------------------------------------  IN:    lactated ringers.: 640 mL    Oral Fluid: 580 mL  Total IN: 1220 mL    OUT:    Voided: 2470 mL  Total OUT: 2470 mL    Total NET: -1250 mL          General: NAD, resting comfortably in bed  C/V: NSR  Pulm: Nonlabored breathing, no respiratory distress  Abd: soft, non distended, tender around incision site, incision c/d/i  Extrem: WWP, no edema, SCDs in place        LABS:                        10.6   7.8   )-----------( 217      ( 25 Jul 2018 07:45 )             31.9     07-24    139  |  99  |  4<L>  ----------------------------<  115<H>  4.4   |  27  |  1.02    Ca    9.8      24 Jul 2018 07:37  Phos  4.9     07-24  Mg     1.6     07-24

## 2018-07-25 NOTE — PROGRESS NOTE ADULT - SUBJECTIVE AND OBJECTIVE BOX
Progress note    TIMBO MENESES  MRN-[id number]      INTERVAL Hx: mild abdominal discomfort, s/p laparoscopic small bowel resection on 7/23. No nausea or vomiting    HPI: 28 y.o man with long standing severe COLTEN due to chronic GI blood loss (Crohn's dx), recently seen in our office for mamagent of severe iron deficiency anemia. His Hgb was 7.5 on 5/30, he received 2 treatments with IV iron with excellent response to treatment-Hgb > 13.0. Now admitted with rectal bleeding    ROS:  The patient denies chest pain or SOB.    No nausea/vomiting/fevers/chills/night sweats.    + fatigue, headaches/dizziness.     No abdominal pain, + hematochezia.    No leg pain or leg swelling.    ROS is otherwise negative.    PMH/PSH:  PAST MEDICAL & SURGICAL HISTORY:  Crohn disease  Stomach ulcer  Irritable bowel syndrome with diarrhea  GI bleeding  History of bowel resection: SMALL BOWEL      Medications:  MEDICATIONS  (STANDING):  sodium chloride 0.9%. 1000 milliLiter(s) (100 mL/Hr) IV Continuous <Continuous>    MEDICATIONS  (PRN):            Allergies    No Known Allergies    Intolerances        Exam:  Vital Signs Last 24 Hrs  T(C): 37.3 (25 Jul 2018 06:18), Max: 37.3 (24 Jul 2018 09:58)  T(F): 99.2 (25 Jul 2018 06:18), Max: 99.2 (25 Jul 2018 06:18)  HR: 70 (25 Jul 2018 06:18) (70 - 82)  BP: 147/80 (25 Jul 2018 06:18) (122/68 - 147/84)  BP(mean): --  ABP: --  ABP(mean): --  RR: 16 (25 Jul 2018 06:18) (16 - 18)  SpO2: 100% (25 Jul 2018 06:18) (98% - 100%)    HEENT: pink mucosae, anicteric sclerae  No palpable peripheral lymphadenopathy  COR: regular rhytm, rate, no murmurs, rubs or gallops  PULMO: clear to auscultation B/L  ABD: soft, no palpable masses, no splenomegaly  EXT: no edema  NEURO: intact  SKIN: no lesions on visible skin    Labs:                        11.2   12.0  )-----------( 237      ( 24 Jul 2018 07:37 )             33.4         CBC Full  -  ( 24 Jul 2018 07:37 )  WBC Count : 12.0 K/uL  Hemoglobin : 11.2 g/dL  Hematocrit : 33.4 %  Platelet Count - Automated : 237 K/uL  Mean Cell Volume : 79.3 fL  Mean Cell Hemoglobin : 26.6 pg  Mean Cell Hemoglobin Concentration : 33.5 g/dL  Auto Neutrophil # : x  Auto Lymphocyte # : x  Auto Monocyte # : x  Auto Eosinophil # : x  Auto Basophil # : x  Auto Neutrophil % : x  Auto Lymphocyte % : x  Auto Monocyte % : x  Auto Eosinophil % : x  Auto Basophil % : x        07-24    139  |  99  |  4<L>  ----------------------------<  115<H>  4.4   |  27  |  1.02    Ca    9.8      24 Jul 2018 07:37  Phos  4.9     07-24  Mg     1.6     07-24            Pertinent imaging studies: reviewed    Assessment:    Severe iron deficiency anemia  Chron's disease    Plan:    Clinically stable and improving  s/p laparoscopic small bowel resection on 7/23  S/p recent IV iron tx as outpatient with excellent response  PRBC as clinically indicated  We will arrange outpatient follow up, he might need additional parenteral iron     Thank you    Rosa Garcia MD  [phone]

## 2018-07-25 NOTE — PROGRESS NOTE ADULT - ASSESSMENT
28 year old male with history of Crohn's disease s/p ileocolic resection (1990 for intussusception) and SBR (2013) who presents with BRBPR x 1 day     Plan:  CLD  OOB, pulm toilet, IS  pain/nausea control   SCDs/SQH

## 2018-07-25 NOTE — PROGRESS NOTE ADULT - SUBJECTIVE AND OBJECTIVE BOX
tolerating liquids  no flatus  AVSS  ABd soft and flat  incision clean    await return of bowel function  clears  OOB  Heploc IVF

## 2018-07-25 NOTE — DIETITIAN INITIAL EVALUATION ADULT. - ENERGY NEEDS
Height: 5'11" Weight: 162lbs, IBW 172lbs+/-10%, %IBW 94%, BMI 22.6  ABW used for calculations as pt between % of IBW.   Nutrient needs based on [facility] standards of care for maintenance in adults.   Needs adjusted for post-op

## 2018-07-26 LAB
ANION GAP SERPL CALC-SCNC: 13 MMOL/L — SIGNIFICANT CHANGE UP (ref 5–17)
BUN SERPL-MCNC: 4 MG/DL — LOW (ref 7–23)
CALCIUM SERPL-MCNC: 10.2 MG/DL — SIGNIFICANT CHANGE UP (ref 8.4–10.5)
CHLORIDE SERPL-SCNC: 98 MMOL/L — SIGNIFICANT CHANGE UP (ref 96–108)
CO2 SERPL-SCNC: 29 MMOL/L — SIGNIFICANT CHANGE UP (ref 22–31)
CREAT SERPL-MCNC: 0.92 MG/DL — SIGNIFICANT CHANGE UP (ref 0.5–1.3)
GLUCOSE SERPL-MCNC: 108 MG/DL — HIGH (ref 70–99)
HCT VFR BLD CALC: 37 % — LOW (ref 39–50)
HGB BLD-MCNC: 11.8 G/DL — LOW (ref 13–17)
MAGNESIUM SERPL-MCNC: 1.9 MG/DL — SIGNIFICANT CHANGE UP (ref 1.6–2.6)
MCHC RBC-ENTMCNC: 26.5 PG — LOW (ref 27–34)
MCHC RBC-ENTMCNC: 31.9 G/DL — LOW (ref 32–36)
MCV RBC AUTO: 83 FL — SIGNIFICANT CHANGE UP (ref 80–100)
PHOSPHATE SERPL-MCNC: 4.3 MG/DL — SIGNIFICANT CHANGE UP (ref 2.5–4.5)
PLATELET # BLD AUTO: 276 K/UL — SIGNIFICANT CHANGE UP (ref 150–400)
POTASSIUM SERPL-MCNC: 4.1 MMOL/L — SIGNIFICANT CHANGE UP (ref 3.5–5.3)
POTASSIUM SERPL-SCNC: 4.1 MMOL/L — SIGNIFICANT CHANGE UP (ref 3.5–5.3)
RBC # BLD: 4.46 M/UL — SIGNIFICANT CHANGE UP (ref 4.2–5.8)
RBC # FLD: 19.9 % — HIGH (ref 10.3–16.9)
SODIUM SERPL-SCNC: 140 MMOL/L — SIGNIFICANT CHANGE UP (ref 135–145)
WBC # BLD: 7.9 K/UL — SIGNIFICANT CHANGE UP (ref 3.8–10.5)
WBC # FLD AUTO: 7.9 K/UL — SIGNIFICANT CHANGE UP (ref 3.8–10.5)

## 2018-07-26 RX ORDER — MAGNESIUM SULFATE 500 MG/ML
1 VIAL (ML) INJECTION ONCE
Qty: 0 | Refills: 0 | Status: COMPLETED | OUTPATIENT
Start: 2018-07-26 | End: 2018-07-26

## 2018-07-26 RX ADMIN — OXYCODONE AND ACETAMINOPHEN 2 TABLET(S): 5; 325 TABLET ORAL at 13:41

## 2018-07-26 RX ADMIN — HEPARIN SODIUM 5000 UNIT(S): 5000 INJECTION INTRAVENOUS; SUBCUTANEOUS at 06:15

## 2018-07-26 RX ADMIN — HEPARIN SODIUM 5000 UNIT(S): 5000 INJECTION INTRAVENOUS; SUBCUTANEOUS at 21:41

## 2018-07-26 RX ADMIN — Medication 100 GRAM(S): at 09:56

## 2018-07-26 RX ADMIN — OXYCODONE AND ACETAMINOPHEN 2 TABLET(S): 5; 325 TABLET ORAL at 03:11

## 2018-07-26 RX ADMIN — OXYCODONE AND ACETAMINOPHEN 2 TABLET(S): 5; 325 TABLET ORAL at 14:25

## 2018-07-26 RX ADMIN — OXYCODONE AND ACETAMINOPHEN 2 TABLET(S): 5; 325 TABLET ORAL at 04:41

## 2018-07-26 NOTE — PROGRESS NOTE ADULT - SUBJECTIVE AND OBJECTIVE BOX
S: Patient admits to mild pain, but improved. He admits to OOBA. He denies flatus/BM, nausea/vomiting.       O:     Vital Signs Last 24 Hrs  T(C): 36.6 (26 Jul 2018 12:40), Max: 37 (25 Jul 2018 13:11)  T(F): 97.8 (26 Jul 2018 12:40), Max: 98.6 (25 Jul 2018 13:11)  HR: 90 (26 Jul 2018 12:40) (68 - 90)  BP: 124/80 (26 Jul 2018 12:40) (114/69 - 134/86)  BP(mean): --  RR: 17 (26 Jul 2018 12:40) (16 - 17)  SpO2: 98% (26 Jul 2018 12:40) (97% - 99%)  I&O's Summary    25 Jul 2018 07:01  -  26 Jul 2018 07:00  --------------------------------------------------------  IN: 960 mL / OUT: 2050 mL / NET: -1090 mL    26 Jul 2018 07:01  -  26 Jul 2018 13:05  --------------------------------------------------------  IN: 240 mL / OUT: 700 mL / NET: -460 mL        Gen - NAD  Resp - normal respiratory effort  GI - abd soft, mild distention, mild TTP surrounding incision. incision C/D/I                          11.8   7.9   )-----------( 276      ( 26 Jul 2018 07:38 )             37.0   07-26    140  |  98  |  4<L>  ----------------------------<  108<H>  4.1   |  29  |  0.92    Ca    10.2      26 Jul 2018 07:38  Phos  4.3     07-26  Mg     1.9     07-26

## 2018-07-26 NOTE — PROGRESS NOTE ADULT - SUBJECTIVE AND OBJECTIVE BOX
no flatus  AVSS  ABD: softly distended  Incision clean    OOB  Clears  await return of bowel function

## 2018-07-26 NOTE — PROGRESS NOTE ADULT - ASSESSMENT
28 year old male with history of Crohn's disease s/p ileocolic resection (1990 for intussusception) and SBR (2013), POD3 s/p Laparoscopic resection of single segment of small intestine w/ anastomosis, doing well.     Plan:  CLD, pending bowel function  OOB, pulm toilet, IS  pain/nausea control   SCDs/SQH

## 2018-07-27 ENCOUNTER — OTHER (OUTPATIENT)
Age: 28
End: 2018-07-27

## 2018-07-27 ENCOUNTER — TRANSCRIPTION ENCOUNTER (OUTPATIENT)
Age: 28
End: 2018-07-27

## 2018-07-27 VITALS
DIASTOLIC BLOOD PRESSURE: 94 MMHG | OXYGEN SATURATION: 98 % | TEMPERATURE: 98 F | HEART RATE: 94 BPM | SYSTOLIC BLOOD PRESSURE: 141 MMHG | RESPIRATION RATE: 17 BRPM

## 2018-07-27 LAB
ANION GAP SERPL CALC-SCNC: 12 MMOL/L — SIGNIFICANT CHANGE UP (ref 5–17)
BUN SERPL-MCNC: 7 MG/DL — SIGNIFICANT CHANGE UP (ref 7–23)
CALCIUM SERPL-MCNC: 9.5 MG/DL — SIGNIFICANT CHANGE UP (ref 8.4–10.5)
CHLORIDE SERPL-SCNC: 98 MMOL/L — SIGNIFICANT CHANGE UP (ref 96–108)
CO2 SERPL-SCNC: 29 MMOL/L — SIGNIFICANT CHANGE UP (ref 22–31)
CREAT SERPL-MCNC: 1.02 MG/DL — SIGNIFICANT CHANGE UP (ref 0.5–1.3)
GLUCOSE SERPL-MCNC: 93 MG/DL — SIGNIFICANT CHANGE UP (ref 70–99)
HCT VFR BLD CALC: 35.9 % — LOW (ref 39–50)
HGB BLD-MCNC: 11.7 G/DL — LOW (ref 13–17)
MAGNESIUM SERPL-MCNC: 1.9 MG/DL — SIGNIFICANT CHANGE UP (ref 1.6–2.6)
MCHC RBC-ENTMCNC: 26.8 PG — LOW (ref 27–34)
MCHC RBC-ENTMCNC: 32.6 G/DL — SIGNIFICANT CHANGE UP (ref 32–36)
MCV RBC AUTO: 82.3 FL — SIGNIFICANT CHANGE UP (ref 80–100)
PHOSPHATE SERPL-MCNC: 4.1 MG/DL — SIGNIFICANT CHANGE UP (ref 2.5–4.5)
PLATELET # BLD AUTO: 251 K/UL — SIGNIFICANT CHANGE UP (ref 150–400)
POTASSIUM SERPL-MCNC: 4.2 MMOL/L — SIGNIFICANT CHANGE UP (ref 3.5–5.3)
POTASSIUM SERPL-SCNC: 4.2 MMOL/L — SIGNIFICANT CHANGE UP (ref 3.5–5.3)
RBC # BLD: 4.36 M/UL — SIGNIFICANT CHANGE UP (ref 4.2–5.8)
RBC # FLD: 19.4 % — HIGH (ref 10.3–16.9)
SODIUM SERPL-SCNC: 139 MMOL/L — SIGNIFICANT CHANGE UP (ref 135–145)
WBC # BLD: 4.9 K/UL — SIGNIFICANT CHANGE UP (ref 3.8–10.5)
WBC # FLD AUTO: 4.9 K/UL — SIGNIFICANT CHANGE UP (ref 3.8–10.5)

## 2018-07-27 PROCEDURE — 85730 THROMBOPLASTIN TIME PARTIAL: CPT

## 2018-07-27 PROCEDURE — 80048 BASIC METABOLIC PNL TOTAL CA: CPT

## 2018-07-27 PROCEDURE — 86901 BLOOD TYPING SEROLOGIC RH(D): CPT

## 2018-07-27 PROCEDURE — 99231 SBSQ HOSP IP/OBS SF/LOW 25: CPT | Mod: GC

## 2018-07-27 PROCEDURE — 85027 COMPLETE CBC AUTOMATED: CPT

## 2018-07-27 PROCEDURE — 80053 COMPREHEN METABOLIC PANEL: CPT

## 2018-07-27 PROCEDURE — 99285 EMERGENCY DEPT VISIT HI MDM: CPT | Mod: 25

## 2018-07-27 PROCEDURE — 84100 ASSAY OF PHOSPHORUS: CPT

## 2018-07-27 PROCEDURE — 88307 TISSUE EXAM BY PATHOLOGIST: CPT

## 2018-07-27 PROCEDURE — 86850 RBC ANTIBODY SCREEN: CPT

## 2018-07-27 PROCEDURE — 87040 BLOOD CULTURE FOR BACTERIA: CPT

## 2018-07-27 PROCEDURE — 86923 COMPATIBILITY TEST ELECTRIC: CPT

## 2018-07-27 PROCEDURE — 83690 ASSAY OF LIPASE: CPT

## 2018-07-27 PROCEDURE — 83735 ASSAY OF MAGNESIUM: CPT

## 2018-07-27 PROCEDURE — 82962 GLUCOSE BLOOD TEST: CPT

## 2018-07-27 PROCEDURE — 83605 ASSAY OF LACTIC ACID: CPT

## 2018-07-27 PROCEDURE — 87086 URINE CULTURE/COLONY COUNT: CPT

## 2018-07-27 PROCEDURE — 36415 COLL VENOUS BLD VENIPUNCTURE: CPT

## 2018-07-27 PROCEDURE — 85610 PROTHROMBIN TIME: CPT

## 2018-07-27 PROCEDURE — 85025 COMPLETE CBC W/AUTO DIFF WBC: CPT

## 2018-07-27 PROCEDURE — 81003 URINALYSIS AUTO W/O SCOPE: CPT

## 2018-07-27 PROCEDURE — 86900 BLOOD TYPING SEROLOGIC ABO: CPT

## 2018-07-27 RX ADMIN — OXYCODONE AND ACETAMINOPHEN 2 TABLET(S): 5; 325 TABLET ORAL at 00:13

## 2018-07-27 RX ADMIN — HEPARIN SODIUM 5000 UNIT(S): 5000 INJECTION INTRAVENOUS; SUBCUTANEOUS at 05:47

## 2018-07-27 RX ADMIN — OXYCODONE AND ACETAMINOPHEN 1 TABLET(S): 5; 325 TABLET ORAL at 09:35

## 2018-07-27 RX ADMIN — OXYCODONE AND ACETAMINOPHEN 2 TABLET(S): 5; 325 TABLET ORAL at 01:13

## 2018-07-27 RX ADMIN — OXYCODONE AND ACETAMINOPHEN 1 TABLET(S): 5; 325 TABLET ORAL at 09:05

## 2018-07-27 NOTE — PROGRESS NOTE ADULT - ASSESSMENT
28 year old male with history of Crohn' s disease s/p ileocolic resection (1990 for intussusception) and SBR (2013) with recent admission for LGIB s/p colonoscopy demonstrating ileocolonic anastamotic ulcer without active bleeding who was admitted for repeat episode bleeding. Colonoscopy demonstrated anastamotic ulcer. Pt is POD #3 for side to side anastamosis with resection of the anastamotic ulcer site and re-anastamosis with resumption of bowel fxn.     #LGIB 2/2 anastomotic ulcer  - POD#3 with surgical intervention   - s/p colonoscopy showing anastomotic ulcer with e/o recent bleeding and scattered apthae in the galo-TI  - Hgb stable   - Full liquid diet -- advance as tolerated   - encourage diet as tolerated to help activate bowels   - Minimize narcotics as it can slow gut motility  - out of bed to chair   - Continue excellent care as per surgery     #Crohn's Dz  - last IFx loading dose 7/5 -- due for next loading 8/2  - Will plan resume ifx infusion (at 10mg/kg) once discharged and cleared from a surgical perspective.   - DVT ppx as per surgery  - Pt will need follow up with Dr. Hannah as outpatient upon d/c     GI will follow     Case d/w Dr. Hannah

## 2018-07-27 NOTE — PROGRESS NOTE ADULT - ASSESSMENT
28 year old male with history of Crohn's disease s/p ileocolic resection (1990 for intussusception) and SBR (2013) s/p lap resection of single segment of small intestine w/ primary reanastomosis. Patient recovering well, passing gas.    Plan:  Adv to FLD  OOB, pulm toilet, IS  pain/nausea control   SCDs/SQH

## 2018-07-27 NOTE — DISCHARGE NOTE ADULT - CARE PROVIDER_API CALL
Roby Boo), ColonRectal Surgery  [address] [place] [address]  Phone: [phone]  Fax: [phone]

## 2018-07-27 NOTE — PROGRESS NOTE ADULT - SUBJECTIVE AND OBJECTIVE BOX
bowels functioning  tolerated low residue breakfast  AVSS  ABD: soft less distended  wounds clean  labs OK    OK for discharge after lunch  F/U with me next week as outpatient

## 2018-07-27 NOTE — PROGRESS NOTE ADULT - SUBJECTIVE AND OBJECTIVE BOX
Progress note:    S: Pain controlled. Passed flatus. Denies N/V.    O:  AVSS    PE:  Gen - NAD  Resp - normal respiratory effort  GI - abd soft, mild distention, mild TTP surrounding incision. incision C/D/I

## 2018-07-27 NOTE — DISCHARGE NOTE ADULT - HOSPITAL COURSE
Mr. Azar is a 28 M with a PMHx of Crohns disease s/p ileocolic resection (1990 for intussusception) and SBR (2013) who presented on 7/20 with LGIB x 1 day. He stated that he had three episodes of bright red blood per rectum. The patient was recently admitted to the surgical service in April of this year for a similar chief complaint at which time colonoscopy demonstrated an ulcer at the ileocolic anastomosis but no active source of bleeding; no transfusions were necessary during that admission. Outpatient CT enterography demonstrated diffuse inflammation consistent with Crohn's disease. On exam, afebrile, abd soft, rectal exam negative for hemorrhoids, fissures or fistulae. Initial hgb 12.0, labs otherwise wnl. He was prepped and optimized for surgery and underwent a laparoscopic resection of single segment of small intestine with anastomosis on 7/23. He tolerated the procedure well. His postoperative course was unremarkable with advancement of diet, passing trial of void, and pain control. However, it took him several days to regain bowel function. On day of discharge patient was stable to be d/c'd home.

## 2018-07-27 NOTE — DISCHARGE NOTE ADULT - PATIENT PORTAL LINK FT
You can access the [facility] Patient Portal, offered by [facility], by registering with the following website: [email or web address]

## 2018-07-27 NOTE — DISCHARGE NOTE ADULT - PLAN OF CARE
Recovery Please follow up with Dr. Boo on Thursday. Call his office to schedule an appointment: [phone].    General Discharge Instructions:  Please resume all regular home medications unless specifically advised not to take a particular medication. Also, please take any new medications as prescribed.  Please get plenty of rest, continue to ambulate several times per day, and drink adequate amounts of fluids. Avoid lifting weights greater than 5-10 lbs until you follow-up with your surgeon, who will instruct you further regarding activity restrictions.  Avoid driving or operating heavy machinery while taking pain medications.  Please follow-up with your surgeon and Primary Care Provider (PCP) as advised.  Incision Care:  *Please call your doctor or nurse practitioner if you have increased pain, swelling, redness, or drainage from the incision site.  *Avoid swimming and baths until your follow-up appointment.  *You may shower, and wash surgical incisions with a mild soap and warm water. Gently pat the area dry.  *If you have staples, they will be removed at your follow-up appointment.  *If you have steri-strips, they will fall off on their own. Please remove any remaining strips 7-10 days after surgery.

## 2018-07-27 NOTE — DISCHARGE NOTE ADULT - CARE PLAN
Principal Discharge DX:	GI bleeding  Goal:	Recovery  Assessment and plan of treatment:	Please follow up with Dr. Boo on Thursday. Call his office to schedule an appointment: [phone].    General Discharge Instructions:  Please resume all regular home medications unless specifically advised not to take a particular medication. Also, please take any new medications as prescribed.  Please get plenty of rest, continue to ambulate several times per day, and drink adequate amounts of fluids. Avoid lifting weights greater than 5-10 lbs until you follow-up with your surgeon, who will instruct you further regarding activity restrictions.  Avoid driving or operating heavy machinery while taking pain medications.  Please follow-up with your surgeon and Primary Care Provider (PCP) as advised.  Incision Care:  *Please call your doctor or nurse practitioner if you have increased pain, swelling, redness, or drainage from the incision site.  *Avoid swimming and baths until your follow-up appointment.  *You may shower, and wash surgical incisions with a mild soap and warm water. Gently pat the area dry.  *If you have staples, they will be removed at your follow-up appointment.  *If you have steri-strips, they will fall off on their own. Please remove any remaining strips 7-10 days after surgery.

## 2018-07-27 NOTE — PROGRESS NOTE ADULT - SUBJECTIVE AND OBJECTIVE BOX
Pt seen and examined at bedside this am. Reports flatus and small BM overnight. Reports improvement in abd pain. No n/v. Tolerating CLD.    REVIEW OF SYSTEMS:  Constitutional: No fever, weight loss or fatigue  Cardiovascular: No chest pain, palpitations, dizziness or leg swelling  Gastrointestinal: No abdominal or epigastric pain. No nausea, vomiting or hematemesis; No diarrhea or constipation. No melena or hematochezia.  Skin: No itching, burning, rashes or lesions       MEDICATIONS:  MEDICATIONS  (STANDING):  heparin  Injectable 5000 Unit(s) SubCutaneous every 8 hours    MEDICATIONS  (PRN):  HYDROmorphone  Injectable 0.5 milliGRAM(s) IV Push every 4 hours PRN Severe Pain (7 - 10)  ondansetron Injectable 4 milliGRAM(s) IV Push every 6 hours PRN Nausea  oxyCODONE    5 mG/acetaminophen 325 mG 1 Tablet(s) Oral every 4 hours PRN Moderate Pain (4 - 6)  oxyCODONE    5 mG/acetaminophen 325 mG 2 Tablet(s) Oral every 4 hours PRN Severe Pain (7 - 10)      Allergies    No Known Allergies    Intolerances        Vital Signs Last 24 Hrs  T(C): 36 (27 Jul 2018 05:37), Max: 36.9 (26 Jul 2018 21:44)  T(F): 96.8 (27 Jul 2018 05:37), Max: 98.5 (26 Jul 2018 21:44)  HR: 76 (27 Jul 2018 05:37) (76 - 91)  BP: 127/88 (27 Jul 2018 05:37) (124/80 - 134/91)  BP(mean): --  RR: 17 (27 Jul 2018 05:37) (16 - 17)  SpO2: 99% (27 Jul 2018 05:37) (98% - 99%)    07-26 @ 07:01  -  07-27 @ 07:00  --------------------------------------------------------  IN: 340 mL / OUT: 1400 mL / NET: -1060 mL        PHYSICAL EXAM:    General: Well developed; well nourished; in no acute distress  HEENT: MMM, conjunctiva and sclera clear  Gastrointestinal: Soft non-tender non-distended; bs+       LABS:      CBC Full  -  ( 27 Jul 2018 07:01 )  WBC Count : 4.9 K/uL  Hemoglobin : 11.7 g/dL  Hematocrit : 35.9 %  Platelet Count - Automated : 251 K/uL  Mean Cell Volume : 82.3 fL  Mean Cell Hemoglobin : 26.8 pg  Mean Cell Hemoglobin Concentration : 32.6 g/dL  Auto Neutrophil # : x  Auto Lymphocyte # : x  Auto Monocyte # : x  Auto Eosinophil # : x  Auto Basophil # : x  Auto Neutrophil % : x  Auto Lymphocyte % : x  Auto Monocyte % : x  Auto Eosinophil % : x  Auto Basophil % : x    07-27    139  |  98  |  7   ----------------------------<  93  4.2   |  29  |  1.02    Ca    9.5      27 Jul 2018 07:01  Phos  4.1     07-27  Mg     1.9     07-27                        RADIOLOGY & ADDITIONAL STUDIES (The following images were personally reviewed):

## 2018-07-27 NOTE — DISCHARGE NOTE ADULT - MEDICATION SUMMARY - MEDICATIONS TO TAKE
I will START or STAY ON the medications listed below when I get home from the hospital:    Percocet 5/325 oral tablet  -- 1 tab(s) by mouth every 4 hours MDD:6  -- Caution federal law prohibits the transfer of this drug to any person other  than the person for whom it was prescribed.  May cause drowsiness.  Alcohol may intensify this effect.  Use care when operating dangerous machinery.  This prescription cannot be refilled.  This product contains acetaminophen.  Do not use  with any other product containing acetaminophen to prevent possible liver damage.  Using more of this medication than prescribed may cause serious breathing problems.    -- Indication: For pain

## 2018-07-31 LAB
CALPROTECTIN FECAL: <16 UG/G
SURGICAL PATHOLOGY STUDY: SIGNIFICANT CHANGE UP

## 2018-08-02 DIAGNOSIS — K50.811 CROHN'S DISEASE OF BOTH SMALL AND LARGE INTESTINE WITH RECTAL BLEEDING: ICD-10-CM
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
## 2018-08-02 DIAGNOSIS — K63.3 ULCER OF INTESTINE: ICD-10-CM

## 2018-08-02 DIAGNOSIS — K91.89 OTHER POSTPROCEDURAL COMPLICATIONS AND DISORDERS OF DIGESTIVE SYSTEM: ICD-10-CM

## 2018-08-02 DIAGNOSIS — K58.0 IRRITABLE BOWEL SYNDROME WITH DIARRHEA: ICD-10-CM

## 2018-08-02 DIAGNOSIS — D50.0 IRON DEFICIENCY ANEMIA SECONDARY TO BLOOD LOSS (CHRONIC): ICD-10-CM

## 2018-08-02 DIAGNOSIS — K66.0 PERITONEAL ADHESIONS (POSTPROCEDURAL) (POSTINFECTION): ICD-10-CM

## 2018-08-02 DIAGNOSIS — Z90.49 ACQUIRED ABSENCE OF OTHER SPECIFIED PARTS OF DIGESTIVE TRACT: ICD-10-CM
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
## 2018-08-02 DIAGNOSIS — Z87.11 PERSONAL HISTORY OF PEPTIC ULCER DISEASE: ICD-10-CM

## 2018-08-06 ENCOUNTER — OUTPATIENT (OUTPATIENT)
Dept: OUTPATIENT SERVICES | Facility: HOSPITAL | Age: 28
LOS: 1 days | End: 2018-08-06
Payer: COMMERCIAL

## 2018-08-06 ENCOUNTER — APPOINTMENT (OUTPATIENT)
Dept: GASTROENTEROLOGY | Facility: CLINIC | Age: 28
End: 2018-08-06
Payer: COMMERCIAL

## 2018-08-06 ENCOUNTER — APPOINTMENT (OUTPATIENT)
Dept: INFUSION THERAPY | Facility: HOSPITAL | Age: 28
End: 2018-08-06

## 2018-08-06 VITALS
OXYGEN SATURATION: 99 % | DIASTOLIC BLOOD PRESSURE: 86 MMHG | SYSTOLIC BLOOD PRESSURE: 129 MMHG | HEART RATE: 90 BPM | RESPIRATION RATE: 16 BRPM | TEMPERATURE: 97 F

## 2018-08-06 VITALS
DIASTOLIC BLOOD PRESSURE: 70 MMHG | WEIGHT: 153 LBS | OXYGEN SATURATION: 98 % | HEIGHT: 71 IN | BODY MASS INDEX: 21.42 KG/M2 | HEART RATE: 79 BPM | SYSTOLIC BLOOD PRESSURE: 100 MMHG | TEMPERATURE: 98 F | RESPIRATION RATE: 14 BRPM

## 2018-08-06 VITALS
RESPIRATION RATE: 18 BRPM | SYSTOLIC BLOOD PRESSURE: 111 MMHG | TEMPERATURE: 99 F | HEART RATE: 80 BPM | OXYGEN SATURATION: 99 % | WEIGHT: 153 LBS | DIASTOLIC BLOOD PRESSURE: 66 MMHG | HEIGHT: 71 IN

## 2018-08-06 DIAGNOSIS — K50.90 CROHN'S DISEASE, UNSPECIFIED, WITHOUT COMPLICATIONS: ICD-10-CM

## 2018-08-06 DIAGNOSIS — Z98.890 OTHER SPECIFIED POSTPROCEDURAL STATES: Chronic | ICD-10-CM

## 2018-08-06 LAB
ALBUMIN SERPL ELPH-MCNC: 4.3 G/DL — SIGNIFICANT CHANGE UP (ref 3.3–5)
ALP SERPL-CCNC: 65 U/L — SIGNIFICANT CHANGE UP (ref 40–120)
ALT FLD-CCNC: 42 U/L — SIGNIFICANT CHANGE UP (ref 10–45)
ANION GAP SERPL CALC-SCNC: 12 MMOL/L — SIGNIFICANT CHANGE UP (ref 5–17)
AST SERPL-CCNC: 28 U/L — SIGNIFICANT CHANGE UP (ref 10–40)
BILIRUB DIRECT SERPL-MCNC: <0.2 MG/DL — SIGNIFICANT CHANGE UP (ref 0–0.2)
BILIRUB INDIRECT FLD-MCNC: >0 MG/DL — LOW (ref 0.2–1)
BILIRUB SERPL-MCNC: 0.2 MG/DL — SIGNIFICANT CHANGE UP (ref 0.2–1.2)
BUN SERPL-MCNC: 9 MG/DL — SIGNIFICANT CHANGE UP (ref 7–23)
CALCIUM SERPL-MCNC: 9.5 MG/DL — SIGNIFICANT CHANGE UP (ref 8.4–10.5)
CHLORIDE SERPL-SCNC: 103 MMOL/L — SIGNIFICANT CHANGE UP (ref 96–108)
CO2 SERPL-SCNC: 25 MMOL/L — SIGNIFICANT CHANGE UP (ref 22–31)
CREAT SERPL-MCNC: 1.16 MG/DL — SIGNIFICANT CHANGE UP (ref 0.5–1.3)
CRP SERPL-MCNC: 0.86 MG/DL — HIGH (ref 0–0.4)
GLUCOSE SERPL-MCNC: 120 MG/DL — HIGH (ref 70–99)
HCT VFR BLD CALC: 34.3 % — LOW (ref 39–50)
HGB BLD-MCNC: 10.8 G/DL — LOW (ref 13–17)
MCHC RBC-ENTMCNC: 26.2 PG — LOW (ref 27–34)
MCHC RBC-ENTMCNC: 31.5 G/DL — LOW (ref 32–36)
MCV RBC AUTO: 83.1 FL — SIGNIFICANT CHANGE UP (ref 80–100)
PLATELET # BLD AUTO: 372 K/UL — SIGNIFICANT CHANGE UP (ref 150–400)
POTASSIUM SERPL-MCNC: 4.2 MMOL/L — SIGNIFICANT CHANGE UP (ref 3.5–5.3)
POTASSIUM SERPL-SCNC: 4.2 MMOL/L — SIGNIFICANT CHANGE UP (ref 3.5–5.3)
PROT SERPL-MCNC: 7 G/DL — SIGNIFICANT CHANGE UP (ref 6–8.3)
RBC # BLD: 4.13 M/UL — LOW (ref 4.2–5.8)
RBC # FLD: 16.9 % — SIGNIFICANT CHANGE UP (ref 10.3–16.9)
SODIUM SERPL-SCNC: 140 MMOL/L — SIGNIFICANT CHANGE UP (ref 135–145)
WBC # BLD: 5.5 K/UL — SIGNIFICANT CHANGE UP (ref 3.8–10.5)
WBC # FLD AUTO: 5.5 K/UL — SIGNIFICANT CHANGE UP (ref 3.8–10.5)

## 2018-08-06 PROCEDURE — 85027 COMPLETE CBC AUTOMATED: CPT

## 2018-08-06 PROCEDURE — 96413 CHEMO IV INFUSION 1 HR: CPT

## 2018-08-06 PROCEDURE — 99214 OFFICE O/P EST MOD 30 MIN: CPT | Mod: GC

## 2018-08-06 PROCEDURE — 80076 HEPATIC FUNCTION PANEL: CPT

## 2018-08-06 PROCEDURE — 80048 BASIC METABOLIC PNL TOTAL CA: CPT

## 2018-08-06 PROCEDURE — 96415 CHEMO IV INFUSION ADDL HR: CPT

## 2018-08-06 PROCEDURE — 96375 TX/PRO/DX INJ NEW DRUG ADDON: CPT

## 2018-08-06 PROCEDURE — 86140 C-REACTIVE PROTEIN: CPT

## 2018-08-06 PROCEDURE — 36415 COLL VENOUS BLD VENIPUNCTURE: CPT

## 2018-08-06 RX ORDER — DIPHENHYDRAMINE HCL 50 MG
50 CAPSULE ORAL ONCE
Qty: 0 | Refills: 0 | Status: COMPLETED | OUTPATIENT
Start: 2018-08-06 | End: 2018-08-06

## 2018-08-06 RX ORDER — ACETAMINOPHEN 500 MG
650 TABLET ORAL ONCE
Qty: 0 | Refills: 0 | Status: COMPLETED | OUTPATIENT
Start: 2018-08-06 | End: 2018-08-06

## 2018-08-06 RX ORDER — INFLIXIMAB-DYYB 120 MG/ML
400 INJECTION SUBCUTANEOUS ONCE
Qty: 0 | Refills: 0 | Status: COMPLETED | OUTPATIENT
Start: 2018-08-06 | End: 2018-08-06

## 2018-08-06 RX ORDER — MESALAMINE 500 MG/1
500 CAPSULE ORAL TWICE DAILY
Qty: 240 | Refills: 3 | Status: DISCONTINUED | COMMUNITY
Start: 2018-05-23 | End: 2018-08-06

## 2018-08-06 RX ADMIN — Medication 650 MILLIGRAM(S): at 12:31

## 2018-08-06 RX ADMIN — Medication 204 MILLIGRAM(S): at 12:33

## 2018-08-06 RX ADMIN — INFLIXIMAB-DYYB 145 MILLIGRAM(S): 120 INJECTION SUBCUTANEOUS at 12:36

## 2018-08-21 ENCOUNTER — APPOINTMENT (OUTPATIENT)
Dept: GASTROENTEROLOGY | Facility: HOSPITAL | Age: 28
End: 2018-08-21

## 2018-09-05 ENCOUNTER — APPOINTMENT (OUTPATIENT)
Dept: GASTROENTEROLOGY | Facility: CLINIC | Age: 28
End: 2018-09-05
Payer: COMMERCIAL

## 2018-09-05 VITALS
TEMPERATURE: 98 F | WEIGHT: 160 LBS | HEART RATE: 77 BPM | BODY MASS INDEX: 22.4 KG/M2 | SYSTOLIC BLOOD PRESSURE: 126 MMHG | RESPIRATION RATE: 14 BRPM | OXYGEN SATURATION: 98 % | HEIGHT: 71 IN | DIASTOLIC BLOOD PRESSURE: 80 MMHG

## 2018-09-05 DIAGNOSIS — F17.200 NICOTINE DEPENDENCE, UNSPECIFIED, UNCOMPLICATED: ICD-10-CM

## 2018-09-05 PROCEDURE — 99215 OFFICE O/P EST HI 40 MIN: CPT | Mod: GC

## 2018-09-06 PROBLEM — F17.200 CURRENT EVERY DAY SMOKER: Status: ACTIVE | Noted: 2018-09-06

## 2018-10-01 ENCOUNTER — OUTPATIENT (OUTPATIENT)
Dept: OUTPATIENT SERVICES | Facility: HOSPITAL | Age: 28
LOS: 1 days | End: 2018-10-01
Payer: COMMERCIAL

## 2018-10-01 ENCOUNTER — APPOINTMENT (OUTPATIENT)
Dept: INFUSION THERAPY | Facility: HOSPITAL | Age: 28
End: 2018-10-01

## 2018-10-01 VITALS
DIASTOLIC BLOOD PRESSURE: 78 MMHG | OXYGEN SATURATION: 99 % | HEART RATE: 70 BPM | TEMPERATURE: 98 F | RESPIRATION RATE: 16 BRPM | SYSTOLIC BLOOD PRESSURE: 124 MMHG

## 2018-10-01 DIAGNOSIS — Z98.890 OTHER SPECIFIED POSTPROCEDURAL STATES: Chronic | ICD-10-CM

## 2018-10-01 DIAGNOSIS — K50.90 CROHN'S DISEASE, UNSPECIFIED, WITHOUT COMPLICATIONS: ICD-10-CM

## 2018-10-01 LAB
ALBUMIN SERPL ELPH-MCNC: 4.2 G/DL — SIGNIFICANT CHANGE UP (ref 3.3–5)
ALP SERPL-CCNC: 57 U/L — SIGNIFICANT CHANGE UP (ref 40–120)
ALT FLD-CCNC: 21 U/L — SIGNIFICANT CHANGE UP (ref 10–45)
ANION GAP SERPL CALC-SCNC: 11 MMOL/L — SIGNIFICANT CHANGE UP (ref 5–17)
AST SERPL-CCNC: 19 U/L — SIGNIFICANT CHANGE UP (ref 10–40)
BILIRUB DIRECT SERPL-MCNC: <0.2 MG/DL — SIGNIFICANT CHANGE UP (ref 0–0.2)
BILIRUB INDIRECT FLD-MCNC: >0.1 MG/DL — LOW (ref 0.2–1)
BILIRUB SERPL-MCNC: 0.3 MG/DL — SIGNIFICANT CHANGE UP (ref 0.2–1.2)
BUN SERPL-MCNC: 14 MG/DL — SIGNIFICANT CHANGE UP (ref 7–23)
CALCIUM SERPL-MCNC: 9.3 MG/DL — SIGNIFICANT CHANGE UP (ref 8.4–10.5)
CHLORIDE SERPL-SCNC: 102 MMOL/L — SIGNIFICANT CHANGE UP (ref 96–108)
CO2 SERPL-SCNC: 27 MMOL/L — SIGNIFICANT CHANGE UP (ref 22–31)
CREAT SERPL-MCNC: 1.04 MG/DL — SIGNIFICANT CHANGE UP (ref 0.5–1.3)
CRP SERPL-MCNC: 0.04 MG/DL — SIGNIFICANT CHANGE UP (ref 0–0.4)
FERRITIN SERPL-MCNC: 12 NG/ML — LOW (ref 30–400)
GLUCOSE SERPL-MCNC: 100 MG/DL — HIGH (ref 70–99)
HCT VFR BLD CALC: 37.3 % — LOW (ref 39–50)
HGB BLD-MCNC: 11.6 G/DL — LOW (ref 13–17)
IRON SATN MFR SERPL: 30 UG/DL — LOW (ref 45–165)
IRON SATN MFR SERPL: 7 % — LOW (ref 16–55)
MCHC RBC-ENTMCNC: 25.3 PG — LOW (ref 27–34)
MCHC RBC-ENTMCNC: 31.1 G/DL — LOW (ref 32–36)
MCV RBC AUTO: 81.3 FL — SIGNIFICANT CHANGE UP (ref 80–100)
PLATELET # BLD AUTO: 211 K/UL — SIGNIFICANT CHANGE UP (ref 150–400)
POTASSIUM SERPL-MCNC: 4.2 MMOL/L — SIGNIFICANT CHANGE UP (ref 3.5–5.3)
POTASSIUM SERPL-SCNC: 4.2 MMOL/L — SIGNIFICANT CHANGE UP (ref 3.5–5.3)
PROT SERPL-MCNC: 6.4 G/DL — SIGNIFICANT CHANGE UP (ref 6–8.3)
RBC # BLD: 4.59 M/UL — SIGNIFICANT CHANGE UP (ref 4.2–5.8)
RBC # FLD: 14.3 % — SIGNIFICANT CHANGE UP (ref 10.3–16.9)
SODIUM SERPL-SCNC: 140 MMOL/L — SIGNIFICANT CHANGE UP (ref 135–145)
TIBC SERPL-MCNC: 447 UG/DL — HIGH (ref 220–430)
UIBC SERPL-MCNC: 417 UG/DL — HIGH (ref 110–370)
WBC # BLD: 5.3 K/UL — SIGNIFICANT CHANGE UP (ref 3.8–10.5)
WBC # FLD AUTO: 5.3 K/UL — SIGNIFICANT CHANGE UP (ref 3.8–10.5)

## 2018-10-01 PROCEDURE — 36415 COLL VENOUS BLD VENIPUNCTURE: CPT

## 2018-10-01 PROCEDURE — 96413 CHEMO IV INFUSION 1 HR: CPT

## 2018-10-01 PROCEDURE — 96415 CHEMO IV INFUSION ADDL HR: CPT

## 2018-10-01 PROCEDURE — 82728 ASSAY OF FERRITIN: CPT

## 2018-10-01 PROCEDURE — 96375 TX/PRO/DX INJ NEW DRUG ADDON: CPT

## 2018-10-01 PROCEDURE — 83550 IRON BINDING TEST: CPT

## 2018-10-01 PROCEDURE — 86140 C-REACTIVE PROTEIN: CPT

## 2018-10-01 PROCEDURE — 85027 COMPLETE CBC AUTOMATED: CPT

## 2018-10-01 PROCEDURE — 80076 HEPATIC FUNCTION PANEL: CPT

## 2018-10-01 PROCEDURE — 80048 BASIC METABOLIC PNL TOTAL CA: CPT

## 2018-10-01 RX ORDER — ACETAMINOPHEN 500 MG
650 TABLET ORAL ONCE
Qty: 0 | Refills: 0 | Status: COMPLETED | OUTPATIENT
Start: 2018-10-01 | End: 2018-10-01

## 2018-10-01 RX ORDER — INFLIXIMAB-DYYB 120 MG/ML
400 INJECTION SUBCUTANEOUS ONCE
Qty: 0 | Refills: 0 | Status: COMPLETED | OUTPATIENT
Start: 2018-10-01 | End: 2018-10-01

## 2018-10-01 RX ORDER — DIPHENHYDRAMINE HCL 50 MG
50 CAPSULE ORAL ONCE
Qty: 0 | Refills: 0 | Status: COMPLETED | OUTPATIENT
Start: 2018-10-01 | End: 2018-10-01

## 2018-10-01 RX ADMIN — INFLIXIMAB-DYYB 145 MILLIGRAM(S): 120 INJECTION SUBCUTANEOUS at 09:30

## 2018-10-01 RX ADMIN — Medication 650 MILLIGRAM(S): at 09:30

## 2018-10-01 RX ADMIN — Medication 102 MILLIGRAM(S): at 09:30

## 2018-11-26 ENCOUNTER — APPOINTMENT (OUTPATIENT)
Dept: INFUSION THERAPY | Facility: HOSPITAL | Age: 28
End: 2018-11-26

## 2018-11-26 ENCOUNTER — OUTPATIENT (OUTPATIENT)
Dept: OUTPATIENT SERVICES | Facility: HOSPITAL | Age: 28
LOS: 1 days | End: 2018-11-26
Payer: COMMERCIAL

## 2018-11-26 VITALS
HEART RATE: 74 BPM | RESPIRATION RATE: 18 BRPM | TEMPERATURE: 97 F | OXYGEN SATURATION: 98 % | DIASTOLIC BLOOD PRESSURE: 72 MMHG | SYSTOLIC BLOOD PRESSURE: 115 MMHG

## 2018-11-26 DIAGNOSIS — Z98.890 OTHER SPECIFIED POSTPROCEDURAL STATES: Chronic | ICD-10-CM

## 2018-11-26 DIAGNOSIS — K50.90 CROHN'S DISEASE, UNSPECIFIED, WITHOUT COMPLICATIONS: ICD-10-CM

## 2018-11-26 LAB
ALBUMIN SERPL ELPH-MCNC: 4.1 G/DL — SIGNIFICANT CHANGE UP (ref 3.3–5)
ALP SERPL-CCNC: 59 U/L — SIGNIFICANT CHANGE UP (ref 40–120)
ALT FLD-CCNC: 18 U/L — SIGNIFICANT CHANGE UP (ref 10–45)
ANION GAP SERPL CALC-SCNC: 7 MMOL/L — SIGNIFICANT CHANGE UP (ref 5–17)
AST SERPL-CCNC: 17 U/L — SIGNIFICANT CHANGE UP (ref 10–40)
BASOPHILS NFR BLD AUTO: 0.8 % — SIGNIFICANT CHANGE UP (ref 0–2)
BILIRUB DIRECT SERPL-MCNC: <0.2 MG/DL — SIGNIFICANT CHANGE UP (ref 0–0.2)
BILIRUB INDIRECT FLD-MCNC: >0.1 MG/DL — LOW (ref 0.2–1)
BILIRUB SERPL-MCNC: 0.3 MG/DL — SIGNIFICANT CHANGE UP (ref 0.2–1.2)
BUN SERPL-MCNC: 11 MG/DL — SIGNIFICANT CHANGE UP (ref 7–23)
CALCIUM SERPL-MCNC: 9 MG/DL — SIGNIFICANT CHANGE UP (ref 8.4–10.5)
CHLORIDE SERPL-SCNC: 107 MMOL/L — SIGNIFICANT CHANGE UP (ref 96–108)
CO2 SERPL-SCNC: 27 MMOL/L — SIGNIFICANT CHANGE UP (ref 22–31)
CREAT SERPL-MCNC: 1.33 MG/DL — HIGH (ref 0.5–1.3)
CRP SERPL-MCNC: <0.03 MG/DL — SIGNIFICANT CHANGE UP (ref 0–0.4)
EOSINOPHIL NFR BLD AUTO: 5.4 % — SIGNIFICANT CHANGE UP (ref 0–6)
GLUCOSE SERPL-MCNC: 108 MG/DL — HIGH (ref 70–99)
HCT VFR BLD CALC: 35.5 % — LOW (ref 39–50)
HGB BLD-MCNC: 10.8 G/DL — LOW (ref 13–17)
LYMPHOCYTES # BLD AUTO: 33.9 % — SIGNIFICANT CHANGE UP (ref 13–44)
MCHC RBC-ENTMCNC: 22.7 PG — LOW (ref 27–34)
MCHC RBC-ENTMCNC: 30.4 G/DL — LOW (ref 32–36)
MCV RBC AUTO: 74.6 FL — LOW (ref 80–100)
MONOCYTES NFR BLD AUTO: 9.4 % — SIGNIFICANT CHANGE UP (ref 2–14)
NEUTROPHILS NFR BLD AUTO: 50.5 % — SIGNIFICANT CHANGE UP (ref 43–77)
PLATELET # BLD AUTO: 242 K/UL — SIGNIFICANT CHANGE UP (ref 150–400)
POTASSIUM SERPL-MCNC: 4.3 MMOL/L — SIGNIFICANT CHANGE UP (ref 3.5–5.3)
POTASSIUM SERPL-SCNC: 4.3 MMOL/L — SIGNIFICANT CHANGE UP (ref 3.5–5.3)
PROT SERPL-MCNC: 6.6 G/DL — SIGNIFICANT CHANGE UP (ref 6–8.3)
RBC # BLD: 4.76 M/UL — SIGNIFICANT CHANGE UP (ref 4.2–5.8)
RBC # FLD: 15.8 % — SIGNIFICANT CHANGE UP (ref 10.3–16.9)
SODIUM SERPL-SCNC: 141 MMOL/L — SIGNIFICANT CHANGE UP (ref 135–145)
WBC # BLD: 5 K/UL — SIGNIFICANT CHANGE UP (ref 3.8–10.5)
WBC # FLD AUTO: 5 K/UL — SIGNIFICANT CHANGE UP (ref 3.8–10.5)

## 2018-11-26 PROCEDURE — 80053 COMPREHEN METABOLIC PANEL: CPT

## 2018-11-26 PROCEDURE — 36415 COLL VENOUS BLD VENIPUNCTURE: CPT

## 2018-11-26 PROCEDURE — 96413 CHEMO IV INFUSION 1 HR: CPT

## 2018-11-26 PROCEDURE — 82248 BILIRUBIN DIRECT: CPT

## 2018-11-26 PROCEDURE — 86140 C-REACTIVE PROTEIN: CPT

## 2018-11-26 PROCEDURE — 96415 CHEMO IV INFUSION ADDL HR: CPT

## 2018-11-26 PROCEDURE — 85025 COMPLETE CBC W/AUTO DIFF WBC: CPT

## 2018-11-26 RX ORDER — ACETAMINOPHEN 500 MG
650 TABLET ORAL ONCE
Qty: 0 | Refills: 0 | Status: COMPLETED | OUTPATIENT
Start: 2018-11-26 | End: 2018-11-26

## 2018-11-26 RX ORDER — INFLIXIMAB-DYYB 120 MG/ML
400 INJECTION SUBCUTANEOUS ONCE
Qty: 0 | Refills: 0 | Status: COMPLETED | OUTPATIENT
Start: 2018-11-26 | End: 2018-11-26

## 2018-11-26 RX ORDER — LORATADINE 10 MG/1
10 TABLET ORAL ONCE
Qty: 0 | Refills: 0 | Status: COMPLETED | OUTPATIENT
Start: 2018-11-26 | End: 2018-11-26

## 2018-11-26 RX ADMIN — Medication 650 MILLIGRAM(S): at 09:53

## 2018-11-26 RX ADMIN — LORATADINE 10 MILLIGRAM(S): 10 TABLET ORAL at 09:52

## 2018-11-26 RX ADMIN — Medication 650 MILLIGRAM(S): at 09:52

## 2018-11-26 RX ADMIN — INFLIXIMAB-DYYB 145 MILLIGRAM(S): 120 INJECTION SUBCUTANEOUS at 09:52

## 2018-11-28 ENCOUNTER — MED ADMIN CHARGE (OUTPATIENT)
Age: 28
End: 2018-11-28

## 2018-11-28 ENCOUNTER — APPOINTMENT (OUTPATIENT)
Dept: GASTROENTEROLOGY | Facility: CLINIC | Age: 28
End: 2018-11-28
Payer: COMMERCIAL

## 2018-11-28 VITALS
TEMPERATURE: 98 F | HEART RATE: 87 BPM | OXYGEN SATURATION: 98 % | BODY MASS INDEX: 22.73 KG/M2 | DIASTOLIC BLOOD PRESSURE: 80 MMHG | RESPIRATION RATE: 14 BRPM | SYSTOLIC BLOOD PRESSURE: 118 MMHG | WEIGHT: 163 LBS

## 2018-11-28 PROCEDURE — 99215 OFFICE O/P EST HI 40 MIN: CPT | Mod: 25

## 2018-11-28 PROCEDURE — 96372 THER/PROPH/DIAG INJ SC/IM: CPT

## 2018-12-18 ENCOUNTER — OUTPATIENT (OUTPATIENT)
Dept: OUTPATIENT SERVICES | Facility: HOSPITAL | Age: 28
LOS: 1 days | End: 2018-12-18
Payer: COMMERCIAL

## 2018-12-18 ENCOUNTER — APPOINTMENT (OUTPATIENT)
Dept: GASTROENTEROLOGY | Facility: HOSPITAL | Age: 28
End: 2018-12-18

## 2018-12-18 DIAGNOSIS — R53.82 CHRONIC FATIGUE, UNSPECIFIED: ICD-10-CM

## 2018-12-18 DIAGNOSIS — D50.9 IRON DEFICIENCY ANEMIA, UNSPECIFIED: ICD-10-CM

## 2018-12-18 DIAGNOSIS — K50.90 CROHN'S DISEASE, UNSPECIFIED, WITHOUT COMPLICATIONS: ICD-10-CM

## 2018-12-18 DIAGNOSIS — Z98.890 OTHER SPECIFIED POSTPROCEDURAL STATES: Chronic | ICD-10-CM

## 2018-12-18 PROCEDURE — 91110 GI TRC IMG INTRAL ESOPH-ILE: CPT | Mod: 26,GC

## 2018-12-18 PROCEDURE — 91110 GI TRC IMG INTRAL ESOPH-ILE: CPT

## 2018-12-21 ENCOUNTER — OUTPATIENT (OUTPATIENT)
Dept: OUTPATIENT SERVICES | Facility: HOSPITAL | Age: 28
LOS: 1 days | End: 2018-12-21
Payer: COMMERCIAL

## 2018-12-21 ENCOUNTER — APPOINTMENT (OUTPATIENT)
Dept: INFUSION THERAPY | Facility: HOSPITAL | Age: 28
End: 2018-12-21

## 2018-12-21 VITALS
WEIGHT: 160.06 LBS | RESPIRATION RATE: 18 BRPM | SYSTOLIC BLOOD PRESSURE: 137 MMHG | HEART RATE: 76 BPM | DIASTOLIC BLOOD PRESSURE: 88 MMHG | HEIGHT: 71 IN | TEMPERATURE: 98 F | OXYGEN SATURATION: 98 %

## 2018-12-21 DIAGNOSIS — K50.90 CROHN'S DISEASE, UNSPECIFIED, WITHOUT COMPLICATIONS: ICD-10-CM

## 2018-12-21 DIAGNOSIS — Z98.890 OTHER SPECIFIED POSTPROCEDURAL STATES: Chronic | ICD-10-CM

## 2018-12-21 DIAGNOSIS — D50.9 IRON DEFICIENCY ANEMIA, UNSPECIFIED: ICD-10-CM

## 2018-12-21 PROCEDURE — 96365 THER/PROPH/DIAG IV INF INIT: CPT

## 2018-12-21 RX ORDER — FERRIC CARBOXYMALTOSE INJECTION 50 MG/ML
750 INJECTION, SOLUTION INTRAVENOUS ONCE
Qty: 0 | Refills: 0 | Status: COMPLETED | OUTPATIENT
Start: 2018-12-21 | End: 2018-12-21

## 2018-12-21 RX ADMIN — FERRIC CARBOXYMALTOSE INJECTION 530 MILLIGRAM(S): 50 INJECTION, SOLUTION INTRAVENOUS at 09:00

## 2018-12-24 DIAGNOSIS — K90.9 INTESTINAL MALABSORPTION, UNSPECIFIED: ICD-10-CM

## 2018-12-28 ENCOUNTER — APPOINTMENT (OUTPATIENT)
Dept: INFUSION THERAPY | Facility: HOSPITAL | Age: 28
End: 2018-12-28

## 2019-01-04 ENCOUNTER — OUTPATIENT (OUTPATIENT)
Dept: OUTPATIENT SERVICES | Facility: HOSPITAL | Age: 29
LOS: 1 days | Discharge: HOME | End: 2019-01-04
Payer: COMMERCIAL

## 2019-01-04 ENCOUNTER — APPOINTMENT (OUTPATIENT)
Dept: GASTROENTEROLOGY | Facility: HOSPITAL | Age: 29
End: 2019-01-04

## 2019-01-04 DIAGNOSIS — Z98.890 OTHER SPECIFIED POSTPROCEDURAL STATES: Chronic | ICD-10-CM

## 2019-01-04 LAB — SALICYLATES SERPL-MCNC: <0.3 MG/DL — LOW (ref 2.8–20)

## 2019-01-04 PROCEDURE — 80307 DRUG TEST PRSMV CHEM ANLYZR: CPT

## 2019-01-04 PROCEDURE — 45380 COLONOSCOPY AND BIOPSY: CPT | Mod: GC

## 2019-01-04 PROCEDURE — 45378 DIAGNOSTIC COLONOSCOPY: CPT

## 2019-01-16 ENCOUNTER — APPOINTMENT (OUTPATIENT)
Dept: GASTROENTEROLOGY | Facility: CLINIC | Age: 29
End: 2019-01-16
Payer: COMMERCIAL

## 2019-01-16 VITALS
WEIGHT: 165 LBS | SYSTOLIC BLOOD PRESSURE: 130 MMHG | DIASTOLIC BLOOD PRESSURE: 80 MMHG | RESPIRATION RATE: 14 BRPM | HEART RATE: 75 BPM | HEIGHT: 71 IN | BODY MASS INDEX: 23.1 KG/M2 | OXYGEN SATURATION: 98 %

## 2019-01-16 PROCEDURE — 99214 OFFICE O/P EST MOD 30 MIN: CPT

## 2019-01-16 RX ORDER — CYANOCOBALAMIN 500 UG/1
500 SPRAY NASAL
Qty: 1 | Refills: 2 | Status: DISCONTINUED | COMMUNITY
Start: 2018-07-18 | End: 2019-01-16

## 2019-01-16 NOTE — ASSESSMENT
[FreeTextEntry1] : 28 M h/o small bowel CD diagnosed in 2018 (on EGD for GI bleed eval w/ + VCE findings), however h/o multiple SBRs (initially for ileo intussusception 1990, then for GI bleed from anastomotic ulcer 2013, followed by laparoscopic ileocolic resection for GI bleed 2/2 anastomotic ulcer in 7/2018), on monotherapy with IFX, with stable symptoms however fatigue and anemia remain. Recent colonoscopy and VCE reveal persistent anastomotic ulceration bleeding despite IFX 5mg/kg.  Has ongoing fatigue which could be related to active disease, raising concern for IFX non-response vs anemia (iron and B12 deficiency) vs stress/anxiety. Certainly has aggressive disease phenotype (SB d/s, young age, multiple surgeries). \par \par # CD\par - given recurrent anastomotic ulceration, will increase IFX to 10mg/kg and monitor drug levels\par - re-evaluate ulcers with VCE after increasing dose \par - we've encouraged him to cease his use of tobacco, alcohol and cocaine to avoid vasoconstrictive and ischemic effects on GI system (he agrees)\par - No specific surgical approaches to this bleeding issue were identified today at IBD Conf where his case was discussed in detail.\par \par # anemia\par - cont to f/u with Dr. Brar ; he's also r/o bleeding diathesis. \par - continue PO B12\par - iv iron with Dr. Brar \par \par # h/o bile acid diarrhea\par - c/w cholestyramine PRN, titrate to 1-2 BMs daily\par \par HSV \par - valacyclovir 1GM daily for prophylaxis due to recurrent HSV mouth sores \par \par RTC 3 months \par \par

## 2019-01-16 NOTE — PHYSICAL EXAM
[General Appearance - Alert] : alert [General Appearance - In No Acute Distress] : in no acute distress [Sclera] : the sclera and conjunctiva were normal [Outer Ear] : the ears and nose were normal in appearance [Examination Of The Oral Cavity] : the lips and gums were normal [Oropharynx] : the oropharynx was normal [Neck Appearance] : the appearance of the neck was normal [] : no respiratory distress [Edema] : there was no peripheral edema [Abdomen Soft] : soft [Abdomen Tenderness] : non-tender [Cervical Lymph Nodes Enlarged Anterior Bilaterally] : anterior cervical [Supraclavicular Lymph Nodes Enlarged Bilaterally] : supraclavicular [No CVA Tenderness] : no ~M costovertebral angle tenderness [No Spinal Tenderness] : no spinal tenderness [Abnormal Walk] : normal gait [No Focal Deficits] : no focal deficits [Oriented To Time, Place, And Person] : oriented to person, place, and time [FreeTextEntry1] : Midline periumbilical incision CDI, no surrounding erythema or increased warmth, no TTP

## 2019-01-16 NOTE — HISTORY OF PRESENT ILLNESS
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  \par \par He says he feels okay, still fatigued despite iron infusions. Having 1-2 BMs/day, soft, no blood, no abd pain. Appetite is good, weight unchanged (165 lb). Feels tired, similar to pre-surgery. No melena. \par Denies melena, nausea, vomiting, fever, chills. Stressful job, long hours. Not sleeping well, sees therapist regularly\par \par EIM: None\par \par Labs reviewed, CRP WNL (never really was high), Hb 10s (11.6 previously), MCV 71\par \par -------------------------------------------------------------------------------------------------------------------\par PRIOR Hx - \par 28 Y M, hx of prior small bowel resection - first for ileo-cecal intussusception as an infant in 1990, and then distal SBR in 2013 for GI bleed; presents today s/p hospitalization and emergent weekend endoscopy for GI bleed and new diagnosis of Crohn's Disease. bx apparently from ileocolonic anastomosis, but VCE showed numerous diffuse sb aphthae.\par \par The patient had sx from his teen years, but moved around and didn't seek care during periods of wellness and also didn't have insurance coverage for a portion of the time.  Before moving to [place], he had several bleeding episodes, one which req. laparoscopic evaluation and resection of small bowel (in [place]) with unclear pathology.  NO one has told him he had any evidence of Crohn's in past. \par \par Pt was hospitalized in February and in April for melanic stools and anemia. He was transfused both admissions. He also had EGD/Colonoscopy and VCE (no reports available) that revealed multiple ulcers at prior ileo-cecal anastomosis w/biopsies that showed active chronic enteritis. \par EGD unrevealing.\par \par Pt was admitted to [facility] from 7/20-7/27/18 for recurrent LGIB 2/2 ileocolic anastomotic ulcer. He underwent a laparoscopic ileocolic resection with side-side anastomosis on 7/23/17. \par Since then, he has received two more loading doses of IFX. \par \par \par Prior biopsies of small bowel reveal active chronic enteritis with cryptitis and crypt architecture distortion. VCE revealed multiple ulcers in the jejunum and terminal ileum. \par Review of op report from [place] 2013 for GIB from anastamotic ulcer:\par 1. ileocectomy with Resection of previous ileal-ileal anastamosis\par 2. Mid ileum resection\par 3. Resection of previous jejunum to jeunum anastamosis; ?which had been bypassed; resected in setting of GIB. \par  \par Fam hx: no IBD or CRC\par Social hx: tobacco user 5x/week, alcohol use 5 drinks/week, marijuana use on occasion not weekly, NSAID use once/two weeks for HAs

## 2019-01-16 NOTE — CONSULT LETTER
[Dear  ___] : Dear  [unfilled], [Courtesy Letter:] : I had the pleasure of seeing your patient, [unfilled], in my office today. [Please see my note below.] : Please see my note below. [Sincerely,] : Sincerely, [FreeTextEntry3] : Dilip Hannah MD\par Director, IBD Program\par [facility]\par \par Associate Professor of Medicine\par [employer] [employer] [facility]\par

## 2019-01-22 ENCOUNTER — APPOINTMENT (OUTPATIENT)
Dept: INFUSION THERAPY | Facility: HOSPITAL | Age: 29
End: 2019-01-22

## 2019-02-04 ENCOUNTER — OUTPATIENT (OUTPATIENT)
Dept: OUTPATIENT SERVICES | Facility: HOSPITAL | Age: 29
LOS: 1 days | End: 2019-02-04
Payer: COMMERCIAL

## 2019-02-04 ENCOUNTER — APPOINTMENT (OUTPATIENT)
Dept: INFUSION THERAPY | Facility: HOSPITAL | Age: 29
End: 2019-02-04

## 2019-02-04 VITALS
HEART RATE: 67 BPM | WEIGHT: 154.98 LBS | OXYGEN SATURATION: 98 % | HEIGHT: 71 IN | DIASTOLIC BLOOD PRESSURE: 82 MMHG | RESPIRATION RATE: 18 BRPM | SYSTOLIC BLOOD PRESSURE: 129 MMHG | TEMPERATURE: 98 F

## 2019-02-04 DIAGNOSIS — K50.90 CROHN'S DISEASE, UNSPECIFIED, WITHOUT COMPLICATIONS: ICD-10-CM

## 2019-02-04 DIAGNOSIS — Z98.890 OTHER SPECIFIED POSTPROCEDURAL STATES: Chronic | ICD-10-CM

## 2019-02-04 LAB
ALBUMIN SERPL ELPH-MCNC: 4.2 G/DL — SIGNIFICANT CHANGE UP (ref 3.3–5)
ALP SERPL-CCNC: 63 U/L — SIGNIFICANT CHANGE UP (ref 40–120)
ALT FLD-CCNC: 39 U/L — SIGNIFICANT CHANGE UP (ref 10–45)
ANION GAP SERPL CALC-SCNC: 9 MMOL/L — SIGNIFICANT CHANGE UP (ref 5–17)
AST SERPL-CCNC: 35 U/L — SIGNIFICANT CHANGE UP (ref 10–40)
BILIRUB DIRECT SERPL-MCNC: <0.2 MG/DL — SIGNIFICANT CHANGE UP (ref 0–0.2)
BILIRUB INDIRECT FLD-MCNC: >0.1 MG/DL — LOW (ref 0.2–1)
BILIRUB SERPL-MCNC: 0.3 MG/DL — SIGNIFICANT CHANGE UP (ref 0.2–1.2)
BUN SERPL-MCNC: 13 MG/DL — SIGNIFICANT CHANGE UP (ref 7–23)
CALCIUM SERPL-MCNC: 8.7 MG/DL — SIGNIFICANT CHANGE UP (ref 8.4–10.5)
CHLORIDE SERPL-SCNC: 107 MMOL/L — SIGNIFICANT CHANGE UP (ref 96–108)
CO2 SERPL-SCNC: 23 MMOL/L — SIGNIFICANT CHANGE UP (ref 22–31)
CREAT SERPL-MCNC: 1.01 MG/DL — SIGNIFICANT CHANGE UP (ref 0.5–1.3)
CRP SERPL-MCNC: 0.07 MG/DL — SIGNIFICANT CHANGE UP (ref 0–0.4)
GLUCOSE SERPL-MCNC: 100 MG/DL — HIGH (ref 70–99)
HCT VFR BLD CALC: 40.5 % — SIGNIFICANT CHANGE UP (ref 39–50)
HGB BLD-MCNC: 13.4 G/DL — SIGNIFICANT CHANGE UP (ref 13–17)
MCHC RBC-ENTMCNC: 26.1 PG — LOW (ref 27–34)
MCHC RBC-ENTMCNC: 33.1 G/DL — SIGNIFICANT CHANGE UP (ref 32–36)
MCV RBC AUTO: 78.8 FL — LOW (ref 80–100)
PLATELET # BLD AUTO: 210 K/UL — SIGNIFICANT CHANGE UP (ref 150–400)
POTASSIUM SERPL-MCNC: 4 MMOL/L — SIGNIFICANT CHANGE UP (ref 3.5–5.3)
POTASSIUM SERPL-SCNC: 4 MMOL/L — SIGNIFICANT CHANGE UP (ref 3.5–5.3)
PROT SERPL-MCNC: 6.6 G/DL — SIGNIFICANT CHANGE UP (ref 6–8.3)
RBC # BLD: 5.14 M/UL — SIGNIFICANT CHANGE UP (ref 4.2–5.8)
RBC # FLD: 23.3 % — HIGH (ref 10.3–16.9)
SODIUM SERPL-SCNC: 139 MMOL/L — SIGNIFICANT CHANGE UP (ref 135–145)
WBC # BLD: 5.9 K/UL — SIGNIFICANT CHANGE UP (ref 3.8–10.5)
WBC # FLD AUTO: 5.9 K/UL — SIGNIFICANT CHANGE UP (ref 3.8–10.5)

## 2019-02-04 PROCEDURE — 96413 CHEMO IV INFUSION 1 HR: CPT

## 2019-02-04 PROCEDURE — 96415 CHEMO IV INFUSION ADDL HR: CPT

## 2019-02-04 PROCEDURE — 80053 COMPREHEN METABOLIC PANEL: CPT

## 2019-02-04 PROCEDURE — 82248 BILIRUBIN DIRECT: CPT

## 2019-02-04 PROCEDURE — 85027 COMPLETE CBC AUTOMATED: CPT

## 2019-02-04 PROCEDURE — 86140 C-REACTIVE PROTEIN: CPT

## 2019-02-04 PROCEDURE — 36415 COLL VENOUS BLD VENIPUNCTURE: CPT

## 2019-02-04 RX ORDER — ACETAMINOPHEN 500 MG
650 TABLET ORAL ONCE
Qty: 0 | Refills: 0 | Status: COMPLETED | OUTPATIENT
Start: 2019-02-04 | End: 2019-02-04

## 2019-02-04 RX ORDER — INFLIXIMAB-DYYB 120 MG/ML
400 INJECTION SUBCUTANEOUS ONCE
Qty: 0 | Refills: 0 | Status: COMPLETED | OUTPATIENT
Start: 2019-02-04 | End: 2019-02-04

## 2019-02-04 RX ORDER — LORATADINE 10 MG/1
10 TABLET ORAL ONCE
Qty: 0 | Refills: 0 | Status: COMPLETED | OUTPATIENT
Start: 2019-02-04 | End: 2019-02-04

## 2019-02-04 RX ADMIN — Medication 650 MILLIGRAM(S): at 08:11

## 2019-02-04 RX ADMIN — INFLIXIMAB-DYYB 145 MILLIGRAM(S): 120 INJECTION SUBCUTANEOUS at 08:28

## 2019-02-04 RX ADMIN — LORATADINE 10 MILLIGRAM(S): 10 TABLET ORAL at 08:11

## 2019-02-05 ENCOUNTER — RX RENEWAL (OUTPATIENT)
Age: 29
End: 2019-02-05

## 2019-02-05 DIAGNOSIS — A09 INFECTIOUS GASTROENTERITIS AND COLITIS, UNSPECIFIED: ICD-10-CM

## 2019-03-12 ENCOUNTER — TRANSCRIPTION ENCOUNTER (OUTPATIENT)
Age: 29
End: 2019-03-12

## 2019-03-12 NOTE — ED ADULT NURSE NOTE - PRO INTERPRETER NEED 2
VITALS:   Height:   55  Weight:   81  Pulse rate:  72  Blood Pressure:  96 / 56    CHIEF COMPLAINT: Left Tib/Fib Fracture    PROBLEMS:   Patient has no noted problems.    PATIENT REPORTED MEDICATIONS:   Patient has no noted medications.    PATIENT REPORTED ALLERGIES:   Patient has no noted allergies.    RISK FACTORS:  Tobacco use:   never smoker  Passive smoke exposure: No  Alcohol Use:   No    HISTORY OF PRESENT ILLNESS:    Akhil is a 8-year-old girl seen with her mom today regarding a left tib/fib fracture.  She jumped off of a roof.  She had a five to six foot drop onto a snowdrift.  The snowdrift was harder than she expected and she ended up with the injury as described.  She has been in a splint for a few days.  She is here for evaluation and to discuss options.    The patient's health history form dated 03/07/2019 was reviewed and signed.  Past medical history, surgical history, social history, family history, and review of systems noted.    PAST MEDICAL HISTORY:    No Past Medical History    PAST ORTHOPEDIC SURGICAL HISTORY:    No Past Orthopedic Surgical History    PAST SURGICAL HISTORY:    No Past Surgical History    FAMILY HISTORY:    Family History Unknown    SOCIAL HISTORY:   Occupation:  Student  Marital Status:  Single  Alcohol Use:  No  Tobacco Use:  Never  Secondhand Smoke Exposure:  No  History of HIV:  No  History of Hepatitis:  No    REVIEW OF SYSTEMS:  Joint or Muscle pain: No  Stiffness:  No  Swelling:  Yes  Difficulty in walking: Yes  On Crutches  Weakness of muscles: No  Rash or Itching: No  Bruising:  No  Numbness/Tingling: Yes  Tingling    PHYSICAL EXAMINATION:    CONSTITUTIONAL:  Patient is alert and oriented x3, well-appearing and in no apparent distress.  Affect is pleasant and answers questions appropriately.  VASCULAR:  Circulation is intact with palpable pedal pulses and has adequate capillary fill time.  NEUROLOGIC:  Light touch sensation is intact to digits.  INTEGUMENT:  No  dermatologic lesions are noted.  Skin with normal texture and turgor.  MUSCULOSKELETAL:  The splint was removed.  She does have normal post-traumatic edema.  No overt concerns.  No open injury.  Musculoskeletal exam is limited due to guarding.    X-RAY:  We did repeat x-rays.  She did fall off her knee scooter once and bumped it.  It appears that everything is in the same position it was.  She does have some anterior gapping of the growth plate, a Salter-Epstein II posterior spike of the tibia.  Likely Salter-Epstein II of the fibula as well.    ASSESSMENT:    Distal tibia/fibula fracture, Salter-Epstein II, left.    PLAN:   Given the nature of this injury and some mild displacement of the distal tibial fragment, I will get a CT scan to better delineate fracture lines and displacement, and see if we need to consider fixation of this.  We will get this in the next day or two.  I will call with the results and consider options.  In the meantime she was placed in a cast and I think we can likely treat this in a cast, but again we will get the CT scan to be sure.    Dictated by Davonte Asencio DPM  cc:  Dr. Asencio at [facility]    D:  03/07/2019  T:  03/11/2019    Typed and/or reviewed and corrected by signing  below, and sent to the Physician for final review and signature.    This report was created using voice recording software and computer-generated templates. Although every effort has been made to review for and eliminate errors, some errors may still occur.         Electronically signed by Ranjit Mcfarland on 03/12/2019 at 8:42 AM   English

## 2019-03-22 ENCOUNTER — APPOINTMENT (OUTPATIENT)
Dept: GASTROENTEROLOGY | Facility: CLINIC | Age: 29
End: 2019-03-22
Payer: COMMERCIAL

## 2019-03-22 VITALS
DIASTOLIC BLOOD PRESSURE: 70 MMHG | BODY MASS INDEX: 22.4 KG/M2 | HEIGHT: 71 IN | RESPIRATION RATE: 14 BRPM | OXYGEN SATURATION: 98 % | WEIGHT: 160 LBS | SYSTOLIC BLOOD PRESSURE: 130 MMHG | HEART RATE: 86 BPM

## 2019-03-22 PROCEDURE — 99214 OFFICE O/P EST MOD 30 MIN: CPT

## 2019-03-22 RX ORDER — IRON ASPGLY,PS/C/B12/FA/CA/SUC 150-25-1
50-100 CAPSULE ORAL
Qty: 1 | Refills: 2 | Status: DISCONTINUED | COMMUNITY
Start: 2018-05-23 | End: 2019-03-22

## 2019-03-22 RX ORDER — AZITHROMYCIN 500 MG/1
500 TABLET, FILM COATED ORAL DAILY
Qty: 1 | Refills: 0 | Status: DISCONTINUED | COMMUNITY
Start: 2019-02-05 | End: 2019-03-22

## 2019-03-22 NOTE — PHYSICAL EXAM
[General Appearance - Alert] : alert [General Appearance - In No Acute Distress] : in no acute distress [Sclera] : the sclera and conjunctiva were normal [Outer Ear] : the ears and nose were normal in appearance [Examination Of The Oral Cavity] : the lips and gums were normal [Oropharynx] : the oropharynx was normal [Neck Appearance] : the appearance of the neck was normal [] : no respiratory distress [Edema] : there was no peripheral edema [Bowel Sounds] : normal bowel sounds [Abdomen Soft] : soft [Abdomen Tenderness] : non-tender [Cervical Lymph Nodes Enlarged Anterior Bilaterally] : anterior cervical [Supraclavicular Lymph Nodes Enlarged Bilaterally] : supraclavicular [No CVA Tenderness] : no ~M costovertebral angle tenderness [No Spinal Tenderness] : no spinal tenderness [Abnormal Walk] : normal gait [Skin Color & Pigmentation] : normal skin color and pigmentation [No Focal Deficits] : no focal deficits [Oriented To Time, Place, And Person] : oriented to person, place, and time [FreeTextEntry1] : Midline periumbilical incision CDI, no surrounding erythema or increased warmth, no TTP

## 2019-03-22 NOTE — CONSULT LETTER
[Dear  ___] : Dear  [unfilled], [Courtesy Letter:] : I had the pleasure of seeing your patient, [unfilled], in my office today. [Please see my note below.] : Please see my note below. [Sincerely,] : Sincerely, [DrJosé  ___] : Dr. WALDEN [FreeTextEntry3] : Kimberly Ervin NP

## 2019-03-22 NOTE — ASSESSMENT
[FreeTextEntry1] : 29 Y M h/o small bowel CD diagnosed in 2018 (on EGD for GI bleed eval w/ + VCE findings), however h/o multiple SBRs (initially for ileo intussusception 1990, then for GI bleed from anastomotic ulcer 2013, followed by laparoscopic ileocolic resection for GI bleed 2/2 anastomotic ulcer in 7/2018), on monotherapy with IFX, with stable symptoms however fatigue remains. Recent colonoscopy and VCE reveal persistent anastomotic ulceration bleeding despite IFX 5mg/kg.  Has ongoing fatigue which could be related to active disease, raising concern for IFX non-response vs anemia (iron and B12 deficiency) vs stress/anxiety. Certainly has aggressive disease phenotype (SB d/s, young age, multiple surgeries). \par \par # CD\par - given recurrent anastomotic ulceration, will increase IFX to 10mg/kg and monitor drug levels - first dose at 800mg to be given in 2 weeks on April 1\par - re-evaluate ulcers with VCE after increasing dose \par - we've encouraged him to cease his use of tobacco, alcohol and cocaine to avoid vasoconstrictive and ischemic effects on GI system (he agrees) - he has stopped all, but still drinking 1 alcoholic beverage per week and is interested in re-introducing more; discussed in detail best to minimize and definitely avoid binge drinking\par - No specific surgical approaches to this bleeding issue were identified at IBD Conf where his case was discussed in detail.\par \par # anemia - hgb stable in Feb\par - cont to f/u with Dr. Brar ; he's also r/o bleeding diathesis. \par - continue PO B12, will check levels with infusion \par - iv iron with Dr. 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

## 2019-03-22 NOTE — HISTORY OF PRESENT ILLNESS
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 His most recent cscope and VCE revealed persistent anastomotic ulcers with normal colon mucosa.   \par \par He says he feels okay, still fatigued despite iron infusions, worse when exercising. Having 1-2 BMs/day, soft, no blood. Has some abd discomfort before BMs and with certain foods. Reports this has been his baseline for years. Appetite is good, weight unchanged.  No melena. \par Denies melena, nausea, vomiting, fever, chills. Stressful job, long hours. Sleeping better on melatonin 1mg prn. Sees therapist regularly (weekly).\par \par Recently was in [place] and [facility], stated trip went well with no GI issues. \par \par EIMs: None\par \par Labs reviewed, CRP WNL (never really was high), Hgb now normal. \par \par -------------------------------------------------------------------------------------------------------------------\par PRIOR Hx - \par 28 Y M, hx of prior small bowel resection - first for ileo-cecal intussusception as an infant in 1990, and then distal SBR in 2013 for GI bleed; presents today s/p hospitalization and emergent weekend endoscopy for GI bleed and new diagnosis of Crohn's Disease. bx apparently from ileocolonic anastomosis, but VCE showed numerous diffuse sb aphthae.\par \par The patient had sx from his teen years, but moved around and didn't seek care during periods of wellness and also didn't have insurance coverage for a portion of the time.  Before moving to [place], he had several bleeding episodes, one which req. laparoscopic evaluation and resection of small bowel (in [place]) with unclear pathology.  NO one has told him he had any evidence of Crohn's in past. \par \par Pt was hospitalized in February and in April for melanic stools and anemia. He was transfused both admissions. He also had EGD/Colonoscopy and VCE (no reports available) that revealed multiple ulcers at prior ileo-cecal anastomosis w/biopsies that showed active chronic enteritis. \par EGD unrevealing.\par \par Pt was admitted to [facility] from 7/20-7/27/18 for recurrent LGIB 2/2 ileocolic anastomotic ulcer. He underwent a laparoscopic ileocolic resection with side-side anastomosis on 7/23/17. \par Since then, he has received two more loading doses of IFX. \par \par \par Prior biopsies of small bowel reveal active chronic enteritis with cryptitis and crypt architecture distortion. VCE revealed multiple ulcers in the jejunum and terminal ileum. \par Review of op report from [place] 2013 for GIB from anastamotic ulcer:\par 1. ileocectomy with Resection of previous ileal-ileal anastamosis\par 2. Mid ileum resection\par 3. Resection of previous jejunum to jeunum anastamosis; ?which had been bypassed; resected in setting of GIB. \par  \par Fam hx: no IBD or CRC\par Social hx: tobacco user 5x/week, alcohol use 5 drinks/week, marijuana use on occasion not weekly, NSAID use once/two weeks for HAs

## 2019-04-01 ENCOUNTER — APPOINTMENT (OUTPATIENT)
Dept: INFUSION THERAPY | Facility: HOSPITAL | Age: 29
End: 2019-04-01

## 2019-04-01 ENCOUNTER — OUTPATIENT (OUTPATIENT)
Dept: OUTPATIENT SERVICES | Facility: HOSPITAL | Age: 29
LOS: 1 days | End: 2019-04-01
Payer: COMMERCIAL

## 2019-04-01 VITALS
RESPIRATION RATE: 16 BRPM | DIASTOLIC BLOOD PRESSURE: 72 MMHG | OXYGEN SATURATION: 99 % | HEART RATE: 74 BPM | TEMPERATURE: 97 F | SYSTOLIC BLOOD PRESSURE: 115 MMHG

## 2019-04-01 VITALS
HEART RATE: 68 BPM | WEIGHT: 160.06 LBS | RESPIRATION RATE: 16 BRPM | HEIGHT: 71 IN | SYSTOLIC BLOOD PRESSURE: 116 MMHG | TEMPERATURE: 98 F | DIASTOLIC BLOOD PRESSURE: 72 MMHG | OXYGEN SATURATION: 98 %

## 2019-04-01 DIAGNOSIS — Z98.890 OTHER SPECIFIED POSTPROCEDURAL STATES: Chronic | ICD-10-CM

## 2019-04-01 DIAGNOSIS — K50.90 CROHN'S DISEASE, UNSPECIFIED, WITHOUT COMPLICATIONS: ICD-10-CM

## 2019-04-01 LAB
24R-OH-CALCIDIOL SERPL-MCNC: 31.1 NG/ML — SIGNIFICANT CHANGE UP (ref 30–80)
ALBUMIN SERPL ELPH-MCNC: 4.1 G/DL — SIGNIFICANT CHANGE UP (ref 3.3–5)
ALP SERPL-CCNC: 63 U/L — SIGNIFICANT CHANGE UP (ref 40–120)
ALT FLD-CCNC: 36 U/L — SIGNIFICANT CHANGE UP (ref 10–45)
ANION GAP SERPL CALC-SCNC: 9 MMOL/L — SIGNIFICANT CHANGE UP (ref 5–17)
AST SERPL-CCNC: 24 U/L — SIGNIFICANT CHANGE UP (ref 10–40)
BILIRUB DIRECT SERPL-MCNC: <0.2 MG/DL — SIGNIFICANT CHANGE UP (ref 0–0.2)
BILIRUB INDIRECT FLD-MCNC: >0.1 MG/DL — LOW (ref 0.2–1)
BILIRUB SERPL-MCNC: 0.3 MG/DL — SIGNIFICANT CHANGE UP (ref 0.2–1.2)
BUN SERPL-MCNC: 11 MG/DL — SIGNIFICANT CHANGE UP (ref 7–23)
CALCIUM SERPL-MCNC: 9.1 MG/DL — SIGNIFICANT CHANGE UP (ref 8.4–10.5)
CHLORIDE SERPL-SCNC: 104 MMOL/L — SIGNIFICANT CHANGE UP (ref 96–108)
CO2 SERPL-SCNC: 25 MMOL/L — SIGNIFICANT CHANGE UP (ref 22–31)
CREAT SERPL-MCNC: 0.99 MG/DL — SIGNIFICANT CHANGE UP (ref 0.5–1.3)
CRP SERPL-MCNC: 0.05 MG/DL — SIGNIFICANT CHANGE UP (ref 0–0.4)
FERRITIN SERPL-MCNC: 129 NG/ML — SIGNIFICANT CHANGE UP (ref 30–400)
FOLATE SERPL-MCNC: 15.5 NG/ML — SIGNIFICANT CHANGE UP
GLUCOSE SERPL-MCNC: 100 MG/DL — HIGH (ref 70–99)
HAV IGG SER QL IA: SIGNIFICANT CHANGE UP
HCT VFR BLD CALC: 41.2 % — SIGNIFICANT CHANGE UP (ref 39–50)
HGB BLD-MCNC: 13.4 G/DL — SIGNIFICANT CHANGE UP (ref 13–17)
IRON SATN MFR SERPL: 22 % — SIGNIFICANT CHANGE UP (ref 16–55)
IRON SATN MFR SERPL: 74 UG/DL — SIGNIFICANT CHANGE UP (ref 45–165)
MCHC RBC-ENTMCNC: 28.7 PG — SIGNIFICANT CHANGE UP (ref 27–34)
MCHC RBC-ENTMCNC: 32.5 GM/DL — SIGNIFICANT CHANGE UP (ref 32–36)
MCV RBC AUTO: 88.2 FL — SIGNIFICANT CHANGE UP (ref 80–100)
NRBC # BLD: 0 /100 WBCS — SIGNIFICANT CHANGE UP (ref 0–0)
PLATELET # BLD AUTO: 201 K/UL — SIGNIFICANT CHANGE UP (ref 150–400)
POTASSIUM SERPL-MCNC: 4 MMOL/L — SIGNIFICANT CHANGE UP (ref 3.5–5.3)
POTASSIUM SERPL-SCNC: 4 MMOL/L — SIGNIFICANT CHANGE UP (ref 3.5–5.3)
PROT SERPL-MCNC: 6.5 G/DL — SIGNIFICANT CHANGE UP (ref 6–8.3)
RBC # BLD: 4.67 M/UL — SIGNIFICANT CHANGE UP (ref 4.2–5.8)
RBC # FLD: 13.5 % — SIGNIFICANT CHANGE UP (ref 10.3–14.5)
SODIUM SERPL-SCNC: 138 MMOL/L — SIGNIFICANT CHANGE UP (ref 135–145)
TIBC SERPL-MCNC: 340 UG/DL — SIGNIFICANT CHANGE UP (ref 220–430)
TRANSFERRIN SERPL-MCNC: 282 MG/DL — SIGNIFICANT CHANGE UP (ref 200–360)
UIBC SERPL-MCNC: 266 UG/DL — SIGNIFICANT CHANGE UP (ref 110–370)
VIT B12 SERPL-MCNC: 259 PG/ML — SIGNIFICANT CHANGE UP (ref 232–1245)
WBC # BLD: 5.61 K/UL — SIGNIFICANT CHANGE UP (ref 3.8–10.5)
WBC # FLD AUTO: 5.61 K/UL — SIGNIFICANT CHANGE UP (ref 3.8–10.5)

## 2019-04-01 PROCEDURE — 96415 CHEMO IV INFUSION ADDL HR: CPT

## 2019-04-01 PROCEDURE — 82306 VITAMIN D 25 HYDROXY: CPT

## 2019-04-01 PROCEDURE — 82746 ASSAY OF FOLIC ACID SERUM: CPT

## 2019-04-01 PROCEDURE — 85027 COMPLETE CBC AUTOMATED: CPT

## 2019-04-01 PROCEDURE — 80076 HEPATIC FUNCTION PANEL: CPT

## 2019-04-01 PROCEDURE — 80048 BASIC METABOLIC PNL TOTAL CA: CPT

## 2019-04-01 PROCEDURE — 36415 COLL VENOUS BLD VENIPUNCTURE: CPT

## 2019-04-01 PROCEDURE — 96413 CHEMO IV INFUSION 1 HR: CPT

## 2019-04-01 PROCEDURE — 84466 ASSAY OF TRANSFERRIN: CPT

## 2019-04-01 PROCEDURE — 82607 VITAMIN B-12: CPT

## 2019-04-01 PROCEDURE — 83540 ASSAY OF IRON: CPT

## 2019-04-01 PROCEDURE — 86708 HEPATITIS A ANTIBODY: CPT

## 2019-04-01 PROCEDURE — 86140 C-REACTIVE PROTEIN: CPT

## 2019-04-01 PROCEDURE — 83550 IRON BINDING TEST: CPT

## 2019-04-01 PROCEDURE — 82728 ASSAY OF FERRITIN: CPT

## 2019-04-01 RX ORDER — LORATADINE 10 MG/1
10 TABLET ORAL ONCE
Qty: 0 | Refills: 0 | Status: COMPLETED | OUTPATIENT
Start: 2019-04-01 | End: 2019-04-01

## 2019-04-01 RX ORDER — INFLIXIMAB-DYYB 120 MG/ML
400 INJECTION SUBCUTANEOUS ONCE
Qty: 0 | Refills: 0 | Status: COMPLETED | OUTPATIENT
Start: 2019-04-01 | End: 2019-04-01

## 2019-04-01 RX ORDER — ACETAMINOPHEN 500 MG
650 TABLET ORAL ONCE
Qty: 0 | Refills: 0 | Status: COMPLETED | OUTPATIENT
Start: 2019-04-01 | End: 2019-04-01

## 2019-04-01 RX ADMIN — Medication 650 MILLIGRAM(S): at 08:47

## 2019-04-01 RX ADMIN — INFLIXIMAB-DYYB 145 MILLIGRAM(S): 120 INJECTION SUBCUTANEOUS at 08:50

## 2019-04-01 RX ADMIN — LORATADINE 10 MILLIGRAM(S): 10 TABLET ORAL at 08:47

## 2019-04-01 RX ADMIN — INFLIXIMAB-DYYB 400 MILLIGRAM(S): 120 INJECTION SUBCUTANEOUS at 10:50

## 2019-04-06 LAB
INFLIXIMAB AB SERPL-MCNC: 49 NG/ML — SIGNIFICANT CHANGE UP
INFLIXIMAB SERPL-MCNC: 1.3 UG/ML — SIGNIFICANT CHANGE UP

## 2019-04-15 ENCOUNTER — RX RENEWAL (OUTPATIENT)
Age: 29
End: 2019-04-15

## 2019-05-29 ENCOUNTER — APPOINTMENT (OUTPATIENT)
Dept: INFUSION THERAPY | Facility: HOSPITAL | Age: 29
End: 2019-05-29

## 2019-05-29 ENCOUNTER — OUTPATIENT (OUTPATIENT)
Dept: OUTPATIENT SERVICES | Facility: HOSPITAL | Age: 29
LOS: 1 days | End: 2019-05-29
Payer: COMMERCIAL

## 2019-05-29 VITALS
HEART RATE: 71 BPM | WEIGHT: 160.06 LBS | SYSTOLIC BLOOD PRESSURE: 121 MMHG | HEIGHT: 71 IN | TEMPERATURE: 98 F | RESPIRATION RATE: 16 BRPM | OXYGEN SATURATION: 98 % | DIASTOLIC BLOOD PRESSURE: 75 MMHG

## 2019-05-29 VITALS
DIASTOLIC BLOOD PRESSURE: 74 MMHG | RESPIRATION RATE: 16 BRPM | TEMPERATURE: 98 F | HEART RATE: 77 BPM | SYSTOLIC BLOOD PRESSURE: 118 MMHG | OXYGEN SATURATION: 100 %

## 2019-05-29 DIAGNOSIS — K50.90 CROHN'S DISEASE, UNSPECIFIED, WITHOUT COMPLICATIONS: ICD-10-CM

## 2019-05-29 DIAGNOSIS — Z98.890 OTHER SPECIFIED POSTPROCEDURAL STATES: Chronic | ICD-10-CM

## 2019-05-29 LAB
ALBUMIN SERPL ELPH-MCNC: 4.1 G/DL — SIGNIFICANT CHANGE UP (ref 3.3–5)
ALP SERPL-CCNC: 67 U/L — SIGNIFICANT CHANGE UP (ref 40–120)
ALT FLD-CCNC: 25 U/L — SIGNIFICANT CHANGE UP (ref 10–45)
ANION GAP SERPL CALC-SCNC: 9 MMOL/L — SIGNIFICANT CHANGE UP (ref 5–17)
AST SERPL-CCNC: 22 U/L — SIGNIFICANT CHANGE UP (ref 10–40)
BILIRUB DIRECT SERPL-MCNC: <0.2 MG/DL — SIGNIFICANT CHANGE UP (ref 0–0.2)
BILIRUB INDIRECT FLD-MCNC: >0.2 MG/DL — SIGNIFICANT CHANGE UP (ref 0.2–1)
BILIRUB SERPL-MCNC: 0.4 MG/DL — SIGNIFICANT CHANGE UP (ref 0.2–1.2)
BUN SERPL-MCNC: 14 MG/DL — SIGNIFICANT CHANGE UP (ref 7–23)
CALCIUM SERPL-MCNC: 9.5 MG/DL — SIGNIFICANT CHANGE UP (ref 8.4–10.5)
CHLORIDE SERPL-SCNC: 106 MMOL/L — SIGNIFICANT CHANGE UP (ref 96–108)
CO2 SERPL-SCNC: 26 MMOL/L — SIGNIFICANT CHANGE UP (ref 22–31)
CREAT SERPL-MCNC: 1.1 MG/DL — SIGNIFICANT CHANGE UP (ref 0.5–1.3)
CRP SERPL-MCNC: 0.1 MG/DL — SIGNIFICANT CHANGE UP (ref 0–0.4)
GLUCOSE SERPL-MCNC: 93 MG/DL — SIGNIFICANT CHANGE UP (ref 70–99)
HCT VFR BLD CALC: 43.9 % — SIGNIFICANT CHANGE UP (ref 39–50)
HGB BLD-MCNC: 14.6 G/DL — SIGNIFICANT CHANGE UP (ref 13–17)
MCHC RBC-ENTMCNC: 29.5 PG — SIGNIFICANT CHANGE UP (ref 27–34)
MCHC RBC-ENTMCNC: 33.3 GM/DL — SIGNIFICANT CHANGE UP (ref 32–36)
MCV RBC AUTO: 88.7 FL — SIGNIFICANT CHANGE UP (ref 80–100)
NRBC # BLD: 0 /100 WBCS — SIGNIFICANT CHANGE UP (ref 0–0)
PLATELET # BLD AUTO: 217 K/UL — SIGNIFICANT CHANGE UP (ref 150–400)
POTASSIUM SERPL-MCNC: 4 MMOL/L — SIGNIFICANT CHANGE UP (ref 3.5–5.3)
POTASSIUM SERPL-SCNC: 4 MMOL/L — SIGNIFICANT CHANGE UP (ref 3.5–5.3)
PROT SERPL-MCNC: 6.8 G/DL — SIGNIFICANT CHANGE UP (ref 6–8.3)
RBC # BLD: 4.95 M/UL — SIGNIFICANT CHANGE UP (ref 4.2–5.8)
RBC # FLD: 12.3 % — SIGNIFICANT CHANGE UP (ref 10.3–14.5)
SODIUM SERPL-SCNC: 141 MMOL/L — SIGNIFICANT CHANGE UP (ref 135–145)
WBC # BLD: 6.62 K/UL — SIGNIFICANT CHANGE UP (ref 3.8–10.5)
WBC # FLD AUTO: 6.62 K/UL — SIGNIFICANT CHANGE UP (ref 3.8–10.5)

## 2019-05-29 PROCEDURE — 36415 COLL VENOUS BLD VENIPUNCTURE: CPT

## 2019-05-29 PROCEDURE — 85027 COMPLETE CBC AUTOMATED: CPT

## 2019-05-29 PROCEDURE — 96415 CHEMO IV INFUSION ADDL HR: CPT

## 2019-05-29 PROCEDURE — 86140 C-REACTIVE PROTEIN: CPT

## 2019-05-29 PROCEDURE — 96413 CHEMO IV INFUSION 1 HR: CPT

## 2019-05-29 PROCEDURE — 80076 HEPATIC FUNCTION PANEL: CPT

## 2019-05-29 PROCEDURE — 80048 BASIC METABOLIC PNL TOTAL CA: CPT

## 2019-05-29 PROCEDURE — 80230 DRUG ASSAY INFLIXIMAB: CPT

## 2019-05-29 RX ORDER — ACETAMINOPHEN 500 MG
650 TABLET ORAL ONCE
Refills: 0 | Status: COMPLETED | OUTPATIENT
Start: 2019-05-29 | End: 2019-05-29

## 2019-05-29 RX ORDER — INFLIXIMAB-DYYB 120 MG/ML
400 INJECTION SUBCUTANEOUS ONCE
Refills: 0 | Status: COMPLETED | OUTPATIENT
Start: 2019-05-29 | End: 2019-05-29

## 2019-05-29 RX ORDER — LORATADINE 10 MG/1
10 TABLET ORAL ONCE
Refills: 0 | Status: COMPLETED | OUTPATIENT
Start: 2019-05-29 | End: 2019-05-29

## 2019-05-29 RX ADMIN — LORATADINE 10 MILLIGRAM(S): 10 TABLET ORAL at 08:41

## 2019-05-29 RX ADMIN — INFLIXIMAB-DYYB 400 MILLIGRAM(S): 120 INJECTION SUBCUTANEOUS at 10:47

## 2019-05-29 RX ADMIN — INFLIXIMAB-DYYB 145 MILLIGRAM(S): 120 INJECTION SUBCUTANEOUS at 08:47

## 2019-05-29 RX ADMIN — Medication 650 MILLIGRAM(S): at 08:42

## 2019-05-31 LAB
ANTIBODIES TO INFLIXIMAB (ATI) CONCENTRATION: < 3.1 CD:431151895 — SIGNIFICANT CHANGE UP
PROMETHEUS ANSER IFX RESULT: SIGNIFICANT CHANGE UP
PROMETHEUS LABORATORY FOOTER: SIGNIFICANT CHANGE UP
SERUM INFLIXIMAB (IFX) CONCENTRATION: 2.2 UG/ML — SIGNIFICANT CHANGE UP

## 2019-06-04 LAB
INFLIXIMAB AB SERPL-MCNC: 26 NG/ML — SIGNIFICANT CHANGE UP
INFLIXIMAB SERPL-MCNC: 0.8 UG/ML — SIGNIFICANT CHANGE UP

## 2019-06-05 ENCOUNTER — APPOINTMENT (OUTPATIENT)
Dept: GASTROENTEROLOGY | Facility: CLINIC | Age: 29
End: 2019-06-05
Payer: COMMERCIAL

## 2019-06-05 VITALS
WEIGHT: 160 LBS | HEART RATE: 115 BPM | OXYGEN SATURATION: 98 % | HEIGHT: 71 IN | DIASTOLIC BLOOD PRESSURE: 90 MMHG | SYSTOLIC BLOOD PRESSURE: 130 MMHG | RESPIRATION RATE: 18 BRPM | BODY MASS INDEX: 22.4 KG/M2

## 2019-06-05 PROCEDURE — 99214 OFFICE O/P EST MOD 30 MIN: CPT

## 2019-06-05 RX ORDER — CHROMIUM 200 MCG
TABLET ORAL
Refills: 0 | Status: ACTIVE | COMMUNITY

## 2019-06-05 RX ORDER — INFLIXIMAB 100 MG/10ML
INJECTION, POWDER, LYOPHILIZED, FOR SOLUTION INTRAVENOUS
Refills: 0 | Status: ACTIVE | COMMUNITY

## 2019-06-05 NOTE — CONSULT LETTER
[Dear  ___] : Dear  [unfilled], [Courtesy Letter:] : I had the pleasure of seeing your patient, [unfilled], in my office today. [Please see my note below.] : Please see my note below. [Sincerely,] : Sincerely, [DrJosé  ___] : Dr. WALDEN

## 2019-06-05 NOTE — ASSESSMENT
[FreeTextEntry1] : 29 Y M h/o small bowel CD diagnosed in 2018 (on EGD for GI bleed eval w/ + VCE findings), however h/o multiple SBRs (initially for ileo intussusception 1990, then for GI bleed from anastomotic ulcer 2013, followed by laparoscopic ileocolic resection for GI bleed 2/2 anastomotic ulcer in 7/2018), on monotherapy with IFX, with stable symptoms however fatigue remains. Recent colonoscopy and VCE reveal persistent anastomotic ulceration bleeding despite IFX 5mg/kg.  Has ongoing fatigue which could be related to active disease, raising concern for IFX non-response vs anemia (iron and B12 deficiency) vs stress/anxiety. Certainly has aggressive disease phenotype (SB d/s, young age, multiple surgeries). \par \par # CD\par - given recurrent anastomotic ulceration, will increase IFX to 10mg/kg and monitor drug levels - first dose at 800mg to be given 4 weeks from last to increase drug levels quickly as he was given only 400mg the last few infusions(last level 2.2, no antibodieS) \par - re-evaluate for mucosal healing with cscope after increasing dose x 2 \par - we've encouraged him to cease his use of tobacco, alcohol and cocaine to avoid vasoconstrictive and ischemic effects on GI system (he agrees) - he has stopped all, but still drinking 1 alcoholic beverage per week and is interested in re-introducing more; discussed definitely avoid binge drinking\par - No specific surgical approaches to this bleeding issue were identified at IBD Conf where his case was discussed in detail.\par \par # anemia - hgb stable \par - cont to f/u with Dr. Brar ; he's also r/o bleeding diathesis. \par - continue PO B12\par - iv iron with Dr. 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 Reid for FBSE \par \par RTC after cscope after 2 higher doses of IFX \par

## 2019-06-05 NOTE — HISTORY OF PRESENT ILLNESS
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 His most recent cscope and VCE revealed persistent anastomotic ulcers with normal colon mucosa.   \par \par He says he feels okay. Having 1-2 BMs/day, loose, no blood. Has some abd discomfort before BMs but is improvement compared to pain with food. Reports this has been his baseline for years. Appetite is good, weight unchanged.  No melena. \par Denies nausea, vomiting, fever, chills. Stressful job, long hours. Sleeping better on melatonin 1mg prn. Sees therapist regularly (weekly).\par \par EIMs: None\par \par Labs reviewed, CRP WNL (never really was high), Hgb now normal. \par \par -------------------------------------------------------------------------------------------------------------------\par PRIOR Hx - \par 28 Y M, hx of prior small bowel resection - first for ileo-cecal intussusception as an infant in 1990, and then distal SBR in 2013 for GI bleed; presents today s/p hospitalization and emergent weekend endoscopy for GI bleed and new diagnosis of Crohn's Disease. bx apparently from ileocolonic anastomosis, but VCE showed numerous diffuse sb aphthae.\par \par The patient had sx from his teen years, but moved around and didn't seek care during periods of wellness and also didn't have insurance coverage for a portion of the time.  Before moving to [place], he had several bleeding episodes, one which req. laparoscopic evaluation and resection of small bowel (in [place]) with unclear pathology.  NO one has told him he had any evidence of Crohn's in past. \par \par Pt was hospitalized in February and in April for melanic stools and anemia. He was transfused both admissions. He also had EGD/Colonoscopy and VCE (no reports available) that revealed multiple ulcers at prior ileo-cecal anastomosis w/biopsies that showed active chronic enteritis. \par EGD unrevealing.\par \par Pt was admitted to [facility] from 7/20-7/27/18 for recurrent LGIB 2/2 ileocolic anastomotic ulcer. He underwent a laparoscopic ileocolic resection with side-side anastomosis on 7/23/17. \par Since then, he has received two more loading doses of IFX. \par \par \par Prior biopsies of small bowel reveal active chronic enteritis with cryptitis and crypt architecture distortion. VCE revealed multiple ulcers in the jejunum and terminal ileum. \par Review of op report from [place] 2013 for GIB from anastamotic ulcer:\par 1. ileocectomy with Resection of previous ileal-ileal anastamosis\par 2. Mid ileum resection\par 3. Resection of previous jejunum to jeunum anastamosis; ?which had been bypassed; resected in setting of GIB. \par  \par Fam hx: no IBD or CRC\par Social hx: tobacco user 5x/week, alcohol use 5 drinks/week, marijuana use on occasion not weekly, NSAID use once/two weeks for HAs

## 2019-06-05 NOTE — PHYSICAL EXAM
[General Appearance - Alert] : alert [Sclera] : the sclera and conjunctiva were normal [General Appearance - In No Acute Distress] : in no acute distress [Outer Ear] : the ears and nose were normal in appearance [Neck Appearance] : the appearance of the neck was normal [Examination Of The Oral Cavity] : the lips and gums were normal [Oropharynx] : the oropharynx was normal [] : the neck was supple [Edema] : there was no peripheral edema [Bowel Sounds] : normal bowel sounds [Abdomen Soft] : soft [Abdomen Tenderness] : non-tender [Cervical Lymph Nodes Enlarged Anterior Bilaterally] : anterior cervical [Supraclavicular Lymph Nodes Enlarged Bilaterally] : supraclavicular [No CVA Tenderness] : no ~M costovertebral angle tenderness [No Spinal Tenderness] : no spinal tenderness [Abnormal Walk] : normal gait [Skin Color & Pigmentation] : normal skin color and pigmentation [No Focal Deficits] : no focal deficits [Oriented To Time, Place, And Person] : oriented to person, place, and time [FreeTextEntry1] : Midline periumbilical incision CDI, no surrounding erythema or increased warmth, no TTP

## 2019-06-06 ENCOUNTER — APPOINTMENT (OUTPATIENT)
Dept: DERMATOLOGY | Facility: CLINIC | Age: 29
End: 2019-06-06
Payer: COMMERCIAL

## 2019-06-06 VITALS — DIASTOLIC BLOOD PRESSURE: 77 MMHG | SYSTOLIC BLOOD PRESSURE: 111 MMHG

## 2019-06-06 DIAGNOSIS — B07.8 OTHER VIRAL WARTS: ICD-10-CM

## 2019-06-06 DIAGNOSIS — Z80.8 FAMILY HISTORY OF MALIGNANT NEOPLASM OF OTHER ORGANS OR SYSTEMS: ICD-10-CM

## 2019-06-06 PROCEDURE — 99243 OFF/OP CNSLTJ NEW/EST LOW 30: CPT | Mod: 25

## 2019-06-06 PROCEDURE — 17110 DESTRUCTION B9 LES UP TO 14: CPT

## 2019-06-26 ENCOUNTER — APPOINTMENT (OUTPATIENT)
Age: 29
End: 2019-06-26

## 2019-06-26 ENCOUNTER — OUTPATIENT (OUTPATIENT)
Dept: OUTPATIENT SERVICES | Facility: HOSPITAL | Age: 29
LOS: 1 days | End: 2019-06-26
Payer: COMMERCIAL

## 2019-06-26 VITALS
TEMPERATURE: 98 F | WEIGHT: 160.06 LBS | SYSTOLIC BLOOD PRESSURE: 112 MMHG | DIASTOLIC BLOOD PRESSURE: 77 MMHG | OXYGEN SATURATION: 99 % | RESPIRATION RATE: 16 BRPM | HEART RATE: 70 BPM | HEIGHT: 71 IN

## 2019-06-26 VITALS
TEMPERATURE: 98 F | SYSTOLIC BLOOD PRESSURE: 110 MMHG | RESPIRATION RATE: 16 BRPM | DIASTOLIC BLOOD PRESSURE: 70 MMHG | OXYGEN SATURATION: 99 % | HEART RATE: 65 BPM

## 2019-06-26 DIAGNOSIS — Z98.890 OTHER SPECIFIED POSTPROCEDURAL STATES: Chronic | ICD-10-CM

## 2019-06-26 DIAGNOSIS — K50.90 CROHN'S DISEASE, UNSPECIFIED, WITHOUT COMPLICATIONS: ICD-10-CM

## 2019-06-26 LAB
ALBUMIN SERPL ELPH-MCNC: 4.1 G/DL — SIGNIFICANT CHANGE UP (ref 3.3–5)
ALP SERPL-CCNC: 74 U/L — SIGNIFICANT CHANGE UP (ref 40–120)
ALT FLD-CCNC: 37 U/L — SIGNIFICANT CHANGE UP (ref 10–45)
ANION GAP SERPL CALC-SCNC: 9 MMOL/L — SIGNIFICANT CHANGE UP (ref 5–17)
AST SERPL-CCNC: 27 U/L — SIGNIFICANT CHANGE UP (ref 10–40)
BASOPHILS # BLD AUTO: 0.05 K/UL — SIGNIFICANT CHANGE UP (ref 0–0.2)
BASOPHILS NFR BLD AUTO: 0.9 % — SIGNIFICANT CHANGE UP (ref 0–2)
BILIRUB DIRECT SERPL-MCNC: <0.2 MG/DL — SIGNIFICANT CHANGE UP (ref 0–0.2)
BILIRUB INDIRECT FLD-MCNC: >0.2 MG/DL — SIGNIFICANT CHANGE UP (ref 0.2–1)
BILIRUB SERPL-MCNC: 0.4 MG/DL — SIGNIFICANT CHANGE UP (ref 0.2–1.2)
BUN SERPL-MCNC: 12 MG/DL — SIGNIFICANT CHANGE UP (ref 7–23)
CALCIUM SERPL-MCNC: 9.4 MG/DL — SIGNIFICANT CHANGE UP (ref 8.4–10.5)
CHLORIDE SERPL-SCNC: 109 MMOL/L — HIGH (ref 96–108)
CO2 SERPL-SCNC: 26 MMOL/L — SIGNIFICANT CHANGE UP (ref 22–31)
CREAT SERPL-MCNC: 1.11 MG/DL — SIGNIFICANT CHANGE UP (ref 0.5–1.3)
CRP SERPL-MCNC: 0.07 MG/DL — SIGNIFICANT CHANGE UP (ref 0–0.4)
EOSINOPHIL # BLD AUTO: 0.14 K/UL — SIGNIFICANT CHANGE UP (ref 0–0.5)
EOSINOPHIL NFR BLD AUTO: 2.5 % — SIGNIFICANT CHANGE UP (ref 0–6)
GLUCOSE SERPL-MCNC: 108 MG/DL — HIGH (ref 70–99)
HCT VFR BLD CALC: 44.2 % — SIGNIFICANT CHANGE UP (ref 39–50)
HGB BLD-MCNC: 14.5 G/DL — SIGNIFICANT CHANGE UP (ref 13–17)
IMM GRANULOCYTES NFR BLD AUTO: 0.7 % — SIGNIFICANT CHANGE UP (ref 0–1.5)
LYMPHOCYTES # BLD AUTO: 1.49 K/UL — SIGNIFICANT CHANGE UP (ref 1–3.3)
LYMPHOCYTES # BLD AUTO: 26.2 % — SIGNIFICANT CHANGE UP (ref 13–44)
MCHC RBC-ENTMCNC: 29.4 PG — SIGNIFICANT CHANGE UP (ref 27–34)
MCHC RBC-ENTMCNC: 32.8 GM/DL — SIGNIFICANT CHANGE UP (ref 32–36)
MCV RBC AUTO: 89.5 FL — SIGNIFICANT CHANGE UP (ref 80–100)
MONOCYTES # BLD AUTO: 0.56 K/UL — SIGNIFICANT CHANGE UP (ref 0–0.9)
MONOCYTES NFR BLD AUTO: 9.9 % — SIGNIFICANT CHANGE UP (ref 2–14)
NEUTROPHILS # BLD AUTO: 3.4 K/UL — SIGNIFICANT CHANGE UP (ref 1.8–7.4)
NEUTROPHILS NFR BLD AUTO: 59.8 % — SIGNIFICANT CHANGE UP (ref 43–77)
NRBC # BLD: 0 /100 WBCS — SIGNIFICANT CHANGE UP (ref 0–0)
PLATELET # BLD AUTO: 226 K/UL — SIGNIFICANT CHANGE UP (ref 150–400)
POTASSIUM SERPL-MCNC: 4.5 MMOL/L — SIGNIFICANT CHANGE UP (ref 3.5–5.3)
POTASSIUM SERPL-SCNC: 4.5 MMOL/L — SIGNIFICANT CHANGE UP (ref 3.5–5.3)
PROT SERPL-MCNC: 6.9 G/DL — SIGNIFICANT CHANGE UP (ref 6–8.3)
RBC # BLD: 4.94 M/UL — SIGNIFICANT CHANGE UP (ref 4.2–5.8)
RBC # FLD: 12.9 % — SIGNIFICANT CHANGE UP (ref 10.3–14.5)
SODIUM SERPL-SCNC: 144 MMOL/L — SIGNIFICANT CHANGE UP (ref 135–145)
WBC # BLD: 5.68 K/UL — SIGNIFICANT CHANGE UP (ref 3.8–10.5)
WBC # FLD AUTO: 5.68 K/UL — SIGNIFICANT CHANGE UP (ref 3.8–10.5)

## 2019-06-26 PROCEDURE — 36415 COLL VENOUS BLD VENIPUNCTURE: CPT

## 2019-06-26 PROCEDURE — 82248 BILIRUBIN DIRECT: CPT

## 2019-06-26 PROCEDURE — 80053 COMPREHEN METABOLIC PANEL: CPT

## 2019-06-26 PROCEDURE — 96413 CHEMO IV INFUSION 1 HR: CPT

## 2019-06-26 PROCEDURE — 86140 C-REACTIVE PROTEIN: CPT

## 2019-06-26 PROCEDURE — 96415 CHEMO IV INFUSION ADDL HR: CPT

## 2019-06-26 PROCEDURE — 85025 COMPLETE CBC W/AUTO DIFF WBC: CPT

## 2019-06-26 RX ORDER — LORATADINE 10 MG/1
10 TABLET ORAL ONCE
Refills: 0 | Status: COMPLETED | OUTPATIENT
Start: 2019-06-26 | End: 2019-06-26

## 2019-06-26 RX ORDER — INFLIXIMAB-DYYB 120 MG/ML
800 INJECTION SUBCUTANEOUS ONCE
Refills: 0 | Status: COMPLETED | OUTPATIENT
Start: 2019-06-26 | End: 2019-06-26

## 2019-06-26 RX ORDER — ACETAMINOPHEN 500 MG
650 TABLET ORAL ONCE
Refills: 0 | Status: COMPLETED | OUTPATIENT
Start: 2019-06-26 | End: 2019-06-26

## 2019-06-26 RX ADMIN — INFLIXIMAB-DYYB 155 MILLIGRAM(S): 120 INJECTION SUBCUTANEOUS at 11:00

## 2019-06-26 RX ADMIN — INFLIXIMAB-DYYB 800 MILLIGRAM(S): 120 INJECTION SUBCUTANEOUS at 13:00

## 2019-06-26 RX ADMIN — LORATADINE 10 MILLIGRAM(S): 10 TABLET ORAL at 10:38

## 2019-06-26 RX ADMIN — Medication 650 MILLIGRAM(S): at 10:38

## 2019-06-30 LAB
INFLIXIMAB AB SERPL-MCNC: 41 NG/ML — SIGNIFICANT CHANGE UP
INFLIXIMAB SERPL-MCNC: 12 UG/ML — SIGNIFICANT CHANGE UP

## 2019-07-10 ENCOUNTER — RX RENEWAL (OUTPATIENT)
Age: 29
End: 2019-07-10

## 2019-07-24 ENCOUNTER — APPOINTMENT (OUTPATIENT)
Dept: INFUSION THERAPY | Facility: HOSPITAL | Age: 29
End: 2019-07-24

## 2019-08-28 ENCOUNTER — APPOINTMENT (OUTPATIENT)
Age: 29
End: 2019-08-28

## 2019-08-28 ENCOUNTER — OUTPATIENT (OUTPATIENT)
Dept: OUTPATIENT SERVICES | Facility: HOSPITAL | Age: 29
LOS: 1 days | End: 2019-08-28
Payer: COMMERCIAL

## 2019-08-28 VITALS
OXYGEN SATURATION: 100 % | RESPIRATION RATE: 18 BRPM | SYSTOLIC BLOOD PRESSURE: 111 MMHG | WEIGHT: 164.91 LBS | TEMPERATURE: 97 F | HEART RATE: 64 BPM | DIASTOLIC BLOOD PRESSURE: 74 MMHG | HEIGHT: 70 IN

## 2019-08-28 VITALS
TEMPERATURE: 97 F | DIASTOLIC BLOOD PRESSURE: 74 MMHG | SYSTOLIC BLOOD PRESSURE: 111 MMHG | RESPIRATION RATE: 16 BRPM | HEART RATE: 64 BPM | OXYGEN SATURATION: 100 %

## 2019-08-28 DIAGNOSIS — Z98.890 OTHER SPECIFIED POSTPROCEDURAL STATES: Chronic | ICD-10-CM

## 2019-08-28 DIAGNOSIS — K50.90 CROHN'S DISEASE, UNSPECIFIED, WITHOUT COMPLICATIONS: ICD-10-CM

## 2019-08-28 LAB
ALBUMIN SERPL ELPH-MCNC: 4 G/DL — SIGNIFICANT CHANGE UP (ref 3.3–5)
ALP SERPL-CCNC: 61 U/L — SIGNIFICANT CHANGE UP (ref 40–120)
ALT FLD-CCNC: 29 U/L — SIGNIFICANT CHANGE UP (ref 10–45)
ANION GAP SERPL CALC-SCNC: 7 MMOL/L — SIGNIFICANT CHANGE UP (ref 5–17)
AST SERPL-CCNC: 28 U/L — SIGNIFICANT CHANGE UP (ref 10–40)
BILIRUB DIRECT SERPL-MCNC: <0.2 MG/DL — SIGNIFICANT CHANGE UP (ref 0–0.2)
BILIRUB INDIRECT FLD-MCNC: >0.3 MG/DL — SIGNIFICANT CHANGE UP (ref 0.2–1)
BILIRUB SERPL-MCNC: 0.5 MG/DL — SIGNIFICANT CHANGE UP (ref 0.2–1.2)
BUN SERPL-MCNC: 11 MG/DL — SIGNIFICANT CHANGE UP (ref 7–23)
CALCIUM SERPL-MCNC: 9.1 MG/DL — SIGNIFICANT CHANGE UP (ref 8.4–10.5)
CHLORIDE SERPL-SCNC: 108 MMOL/L — SIGNIFICANT CHANGE UP (ref 96–108)
CO2 SERPL-SCNC: 27 MMOL/L — SIGNIFICANT CHANGE UP (ref 22–31)
CREAT SERPL-MCNC: 0.95 MG/DL — SIGNIFICANT CHANGE UP (ref 0.5–1.3)
CRP SERPL-MCNC: 0.05 MG/DL — SIGNIFICANT CHANGE UP (ref 0–0.4)
GLUCOSE SERPL-MCNC: 92 MG/DL — SIGNIFICANT CHANGE UP (ref 70–99)
HCT VFR BLD CALC: 43.9 % — SIGNIFICANT CHANGE UP (ref 39–50)
HGB BLD-MCNC: 14 G/DL — SIGNIFICANT CHANGE UP (ref 13–17)
MCHC RBC-ENTMCNC: 28.6 PG — SIGNIFICANT CHANGE UP (ref 27–34)
MCHC RBC-ENTMCNC: 31.9 GM/DL — LOW (ref 32–36)
MCV RBC AUTO: 89.8 FL — SIGNIFICANT CHANGE UP (ref 80–100)
NRBC # BLD: 0 /100 WBCS — SIGNIFICANT CHANGE UP (ref 0–0)
PLATELET # BLD AUTO: 217 K/UL — SIGNIFICANT CHANGE UP (ref 150–400)
POTASSIUM SERPL-MCNC: 5.1 MMOL/L — SIGNIFICANT CHANGE UP (ref 3.5–5.3)
POTASSIUM SERPL-SCNC: 5.1 MMOL/L — SIGNIFICANT CHANGE UP (ref 3.5–5.3)
PROT SERPL-MCNC: 6.7 G/DL — SIGNIFICANT CHANGE UP (ref 6–8.3)
RBC # BLD: 4.89 M/UL — SIGNIFICANT CHANGE UP (ref 4.2–5.8)
RBC # FLD: 13.5 % — SIGNIFICANT CHANGE UP (ref 10.3–14.5)
SODIUM SERPL-SCNC: 142 MMOL/L — SIGNIFICANT CHANGE UP (ref 135–145)
WBC # BLD: 6.66 K/UL — SIGNIFICANT CHANGE UP (ref 3.8–10.5)
WBC # FLD AUTO: 6.66 K/UL — SIGNIFICANT CHANGE UP (ref 3.8–10.5)

## 2019-08-28 PROCEDURE — 36415 COLL VENOUS BLD VENIPUNCTURE: CPT

## 2019-08-28 PROCEDURE — 80048 BASIC METABOLIC PNL TOTAL CA: CPT

## 2019-08-28 PROCEDURE — 85027 COMPLETE CBC AUTOMATED: CPT

## 2019-08-28 PROCEDURE — 86140 C-REACTIVE PROTEIN: CPT

## 2019-08-28 PROCEDURE — 96413 CHEMO IV INFUSION 1 HR: CPT

## 2019-08-28 PROCEDURE — 96415 CHEMO IV INFUSION ADDL HR: CPT

## 2019-08-28 PROCEDURE — 80076 HEPATIC FUNCTION PANEL: CPT

## 2019-08-28 RX ORDER — LORATADINE 10 MG/1
10 TABLET ORAL ONCE
Refills: 0 | Status: COMPLETED | OUTPATIENT
Start: 2019-08-28 | End: 2019-08-28

## 2019-08-28 RX ORDER — LORATADINE 10 MG/1
10 TABLET ORAL DAILY
Refills: 0 | Status: DISCONTINUED | OUTPATIENT
Start: 2019-08-28 | End: 2019-08-28

## 2019-08-28 RX ORDER — ACETAMINOPHEN 500 MG
650 TABLET ORAL ONCE
Refills: 0 | Status: COMPLETED | OUTPATIENT
Start: 2019-08-28 | End: 2019-08-28

## 2019-08-28 RX ORDER — INFLIXIMAB-DYYB 120 MG/ML
800 INJECTION SUBCUTANEOUS ONCE
Refills: 0 | Status: COMPLETED | OUTPATIENT
Start: 2019-08-28 | End: 2019-08-28

## 2019-08-28 RX ADMIN — Medication 650 MILLIGRAM(S): at 08:57

## 2019-08-28 RX ADMIN — INFLIXIMAB-DYYB 155 MILLIGRAM(S): 120 INJECTION SUBCUTANEOUS at 08:57

## 2019-08-28 RX ADMIN — Medication 650 MILLIGRAM(S): at 08:46

## 2019-08-28 RX ADMIN — LORATADINE 10 MILLIGRAM(S): 10 TABLET ORAL at 08:46

## 2019-08-28 RX ADMIN — INFLIXIMAB-DYYB 800 MILLIGRAM(S): 120 INJECTION SUBCUTANEOUS at 10:57

## 2019-09-04 LAB
INFLIXIMAB AB SERPL-MCNC: <22 NG/ML — SIGNIFICANT CHANGE UP
INFLIXIMAB SERPL-MCNC: 3 UG/ML — SIGNIFICANT CHANGE UP

## 2019-09-05 ENCOUNTER — RESULT REVIEW (OUTPATIENT)
Age: 29
End: 2019-09-05

## 2019-09-05 ENCOUNTER — OUTPATIENT (OUTPATIENT)
Dept: OUTPATIENT SERVICES | Facility: HOSPITAL | Age: 29
LOS: 1 days | Discharge: HOME | End: 2019-09-05
Payer: COMMERCIAL

## 2019-09-05 ENCOUNTER — APPOINTMENT (OUTPATIENT)
Dept: GASTROENTEROLOGY | Facility: HOSPITAL | Age: 29
End: 2019-09-05

## 2019-09-05 DIAGNOSIS — Z98.890 OTHER SPECIFIED POSTPROCEDURAL STATES: Chronic | ICD-10-CM

## 2019-09-05 PROCEDURE — 45380 COLONOSCOPY AND BIOPSY: CPT

## 2019-09-05 PROCEDURE — 45380 COLONOSCOPY AND BIOPSY: CPT | Mod: GC

## 2019-09-05 PROCEDURE — 88305 TISSUE EXAM BY PATHOLOGIST: CPT

## 2019-09-06 LAB — SURGICAL PATHOLOGY STUDY: SIGNIFICANT CHANGE UP

## 2019-10-02 ENCOUNTER — APPOINTMENT (OUTPATIENT)
Dept: GASTROENTEROLOGY | Facility: CLINIC | Age: 29
End: 2019-10-02
Payer: COMMERCIAL

## 2019-10-02 VITALS
DIASTOLIC BLOOD PRESSURE: 72 MMHG | HEART RATE: 69 BPM | RESPIRATION RATE: 14 BRPM | OXYGEN SATURATION: 98 % | HEIGHT: 71 IN | WEIGHT: 165 LBS | BODY MASS INDEX: 23.1 KG/M2 | SYSTOLIC BLOOD PRESSURE: 114 MMHG

## 2019-10-02 PROCEDURE — 99214 OFFICE O/P EST MOD 30 MIN: CPT

## 2019-10-04 NOTE — PHYSICAL EXAM
[General Appearance - Alert] : alert [General Appearance - In No Acute Distress] : in no acute distress [Sclera] : the sclera and conjunctiva were normal [Outer Ear] : the ears and nose were normal in appearance [Examination Of The Oral Cavity] : the lips and gums were normal [Oropharynx] : the oropharynx was normal [Neck Appearance] : the appearance of the neck was normal [] : no respiratory distress [Edema] : there was no peripheral edema [Bowel Sounds] : normal bowel sounds [Abdomen Soft] : soft [Abdomen Tenderness] : non-tender [Cervical Lymph Nodes Enlarged Anterior Bilaterally] : anterior cervical [Supraclavicular Lymph Nodes Enlarged Bilaterally] : supraclavicular [No CVA Tenderness] : no ~M costovertebral angle tenderness [Abnormal Walk] : normal gait [No Spinal Tenderness] : no spinal tenderness [Skin Color & Pigmentation] : normal skin color and pigmentation [No Focal Deficits] : no focal deficits [Oriented To Time, Place, And Person] : oriented to person, place, and time [FreeTextEntry1] : Thin built, appears chronically ill

## 2019-10-04 NOTE — ASSESSMENT
[FreeTextEntry1] : 29 Y M h/o small bowel CD diagnosed in 2018 (on EGD for GI bleed eval w/ + VCE findings), however h/o multiple SBRs (initially for ileo intussusception 1990, then for GI bleed from anastomotic ulcer 2013, followed by laparoscopic ileocolic resection for GI bleed 2/2 anastomotic ulcer in 7/2018), on monotherapy with IFX, with stable symptoms. Recent colonoscopy and VCE reveal persistent anastomotic ulceration and bleeding, (though no luminal inflammation), despite increased IFX dosing of 10mg/kg.  Certainly has aggressive disease phenotype (SB d/s, young age, multiple surgeries). \par \par # CD\par - will continue IFX at 10mg/kg and continue to monitor drug levels (discussed the dosing and given most recent level is on the low end of normal with higher dosing regimen, will keep for now). \par - we've continued to encourage him to cease his use of tobacco completely (though he states he's only smoking 1 cigarette every 3 weeks, minimize alcohol consumption and definitively avoid binge drinking (currently drinking 2.5 glasses of wine few times a week), (not using cocaine) to avoid vasoconstrictive and ischemic effects on GI system (he agrees)\par - No specific surgical approaches to this bleeding issue were identified at the prior IBD Conf where his case was discussed in detail.\par - plan for referral for hyperbaric chamber treatment. We discussed at length that this is a logical approach given component of ischemia and absence of progression in luminal dz burden with high dose ifx. He will be given referral for [facility] hyper[facility]ic center and wants to try this out to help prevent recurrence of severe anastamotic ulcer bleed\par \par # anemia - hgb stable \par - cont to f/u with Dr. Brar ; he's also r/o bleeding diathesis. \par - continue PO B12\par - iv iron with Dr. 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 Hannah\par \par Sonia Flores M.D. PGY-7\par IBD Fellow [place]

## 2019-10-04 NOTE — HISTORY OF PRESENT ILLNESS
[Heartburn] : denies heartburn [Nausea] : denies nausea [Vomiting] : denies vomiting [Diarrhea] : stable diarrhea [Constipation] : stable constipation [Yellow Skin Or Eyes (Jaundice)] : denies jaundice [Abdominal Pain] : stable abdominal pain [Abdominal Swelling] : denies abdominal swelling [Rectal Pain] : denies rectal pain [___ Month(s) Ago] : [unfilled] month(s) ago [Wt Gain ___ Lbs] : no recent weight gain [Wt Loss ___ Lbs] : no recent weight loss [de-identified] : 29 M, h/o CD, s/p SBR x 3 (initially for ileo-cecal intussusception 1990, then for ?GI bleed 2013 at [facility]) and again in July 2018 for anastomotic bleed, with 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 His most recent cscopes and VCE revealed persistent anastomotic ulcers with normal colon mucosa.\par \par Last visit was June 2019. At last visit June 2019, patient's IFX dose was increased to 10mg/kg from 5mg/kg. After 2 sessions of the increased dose, he underwent a colonoscopy 9/5/19 to see whether there was any improvement in the ulcerations with higher dose of infliximab. Colonoscopy showed i1 disease, and anastatomic ulcer was still present friable with spontaneous oozing, seemed to have extended since prior colonoscopy. At that time discussed the possibility of a hyperbaric chamber with high dose oxygen as treatment for the ulcer, which has been tried for other indications. \par \par Colonoscopy 9/5/19: side to side anastamosis, Leonard-TI with 2 apthous ulcers, Ruutgers score i1, anastamotic ulcer friable with spontaneous oozing. Seemed to have extended since prior colon despite increased IFX. Ulcer not likely inflammatory in nature. \par Pathology: unremark\par \par Patient reports he has not had any bleeding (when he does it gets very severe and requires hospitalization). He notes he has had 3 emergency bowel surgeries for bleeding as well as a surgery for intussusception at 1 years of age. The most recent surgeries have been at [facility] and [place] for intractible bleed. \par \par He continues to have 1-2 loose, non-bloody BMs/day that respond well to cholestyramine, though he notes it has been a little less effective lately. Has some abd discomfort before BMs but is improvement compared to pain with food. Reports this has been his baseline for years. Appetite is good, weight unchanged.  No melena. Denies nausea, vomiting, fever, chills. Stressful job, long hours. Sleeping better on melatonin 1mg e reports having minimal abdominal pain that sometimes happens with food. Denies EIMs.\par \par Reports he continues to smoke only 1 cigarette every 3 weeks. Drinks 2.5 glasses of wine few times a week. No cocaine use. \par \par Labs Aug '19 reviewed: CBC, CMP unremarkable\par CRP from April '19 WNL (never really was high)\par B12 was low normal in April, taking oral B12 right now\par IFX drug levels reviewed and most recently Drug level 3, Ab <22\par \par -------------------------------------------------------------------------------------------------------------------\par PRIOR Hx - \par 28 Y M, hx of prior small bowel resection - first for ileo-cecal intussusception as an infant in 1990, and then distal SBR in 2013 for GI bleed; presents today s/p hospitalization and emergent weekend endoscopy for GI bleed and new diagnosis of Crohn's Disease. bx apparently from ileocolonic anastomosis, but VCE showed numerous diffuse sb aphthae.\par \par The patient had sx from his teen years, but moved around and didn't seek care during periods of wellness and also didn't have insurance coverage for a portion of the time.  Before moving to [place], he had several bleeding episodes, one which req. laparoscopic evaluation and resection of small bowel (in [place]) with unclear pathology.  NO one has told him he had any evidence of Crohn's in past. \par \best Pt was hospitalized in February and in April for melanic stools and anemia. He was transfused both admissions. He also had EGD/Colonoscopy and VCE (no reports available) that revealed multiple ulcers at prior ileo-cecal anastomosis w/biopsies that showed active chronic enteritis. \par EGD unrevealing.\par \best Pt was admitted to [facility] from 7/20-7/27/18 for recurrent LGIB 2/2 ileocolic anastomotic ulcer. He underwent a laparoscopic ileocolic resection with side-side anastomosis on 7/23/17. \par Since then, he has received two more loading doses of IFX. \par \par \par Prior biopsies of small bowel reveal active chronic enteritis with cryptitis and crypt architecture distortion. VCE revealed multiple ulcers in the jejunum and terminal ileum. \par Review of op report from [place] 2013 for GIB from anastamotic ulcer:\par 1. ileocectomy with Resection of previous ileal-ileal anastamosis\par 2. Mid ileum resection\par 3. Resection of previous jejunum to jeunum anastamosis; ?which had been bypassed; resected in setting of GIB. \par  \par Fam hx: no IBD or CRC\par Social hx: tobacco user 5x/week, alcohol use 5 drinks/week, marijuana use on occasion not weekly, NSAID use once/two weeks for HAs

## 2019-10-23 ENCOUNTER — APPOINTMENT (OUTPATIENT)
Age: 29
End: 2019-10-23

## 2019-10-23 ENCOUNTER — OUTPATIENT (OUTPATIENT)
Dept: OUTPATIENT SERVICES | Facility: HOSPITAL | Age: 29
LOS: 1 days | End: 2019-10-23
Payer: COMMERCIAL

## 2019-10-23 VITALS
OXYGEN SATURATION: 99 % | HEART RATE: 56 BPM | TEMPERATURE: 98 F | SYSTOLIC BLOOD PRESSURE: 120 MMHG | DIASTOLIC BLOOD PRESSURE: 78 MMHG | RESPIRATION RATE: 16 BRPM

## 2019-10-23 VITALS
HEART RATE: 70 BPM | HEIGHT: 71 IN | TEMPERATURE: 98 F | WEIGHT: 164.91 LBS | SYSTOLIC BLOOD PRESSURE: 107 MMHG | OXYGEN SATURATION: 98 % | DIASTOLIC BLOOD PRESSURE: 69 MMHG | RESPIRATION RATE: 18 BRPM

## 2019-10-23 DIAGNOSIS — K50.90 CROHN'S DISEASE, UNSPECIFIED, WITHOUT COMPLICATIONS: ICD-10-CM

## 2019-10-23 DIAGNOSIS — Z98.890 OTHER SPECIFIED POSTPROCEDURAL STATES: Chronic | ICD-10-CM

## 2019-10-23 LAB
ALBUMIN SERPL ELPH-MCNC: 4.2 G/DL — SIGNIFICANT CHANGE UP (ref 3.3–5)
ALP SERPL-CCNC: 65 U/L — SIGNIFICANT CHANGE UP (ref 40–120)
ALT FLD-CCNC: 29 U/L — SIGNIFICANT CHANGE UP (ref 10–45)
ANION GAP SERPL CALC-SCNC: 8 MMOL/L — SIGNIFICANT CHANGE UP (ref 5–17)
AST SERPL-CCNC: 32 U/L — SIGNIFICANT CHANGE UP (ref 10–40)
BILIRUB DIRECT SERPL-MCNC: <0.2 MG/DL — SIGNIFICANT CHANGE UP (ref 0–0.2)
BILIRUB INDIRECT FLD-MCNC: >0.3 MG/DL — SIGNIFICANT CHANGE UP (ref 0.2–1)
BILIRUB SERPL-MCNC: 0.5 MG/DL — SIGNIFICANT CHANGE UP (ref 0.2–1.2)
BUN SERPL-MCNC: 12 MG/DL — SIGNIFICANT CHANGE UP (ref 7–23)
CALCIUM SERPL-MCNC: 9.5 MG/DL — SIGNIFICANT CHANGE UP (ref 8.4–10.5)
CHLORIDE SERPL-SCNC: 106 MMOL/L — SIGNIFICANT CHANGE UP (ref 96–108)
CO2 SERPL-SCNC: 28 MMOL/L — SIGNIFICANT CHANGE UP (ref 22–31)
CREAT SERPL-MCNC: 0.95 MG/DL — SIGNIFICANT CHANGE UP (ref 0.5–1.3)
CRP SERPL-MCNC: 0.05 MG/DL — SIGNIFICANT CHANGE UP (ref 0–0.4)
GLUCOSE SERPL-MCNC: 91 MG/DL — SIGNIFICANT CHANGE UP (ref 70–99)
HCT VFR BLD CALC: 41.8 % — SIGNIFICANT CHANGE UP (ref 39–50)
HGB BLD-MCNC: 14.1 G/DL — SIGNIFICANT CHANGE UP (ref 13–17)
MCHC RBC-ENTMCNC: 29.4 PG — SIGNIFICANT CHANGE UP (ref 27–34)
MCHC RBC-ENTMCNC: 33.7 GM/DL — SIGNIFICANT CHANGE UP (ref 32–36)
MCV RBC AUTO: 87.1 FL — SIGNIFICANT CHANGE UP (ref 80–100)
NRBC # BLD: 0 /100 WBCS — SIGNIFICANT CHANGE UP (ref 0–0)
PLATELET # BLD AUTO: 224 K/UL — SIGNIFICANT CHANGE UP (ref 150–400)
POTASSIUM SERPL-MCNC: 4.2 MMOL/L — SIGNIFICANT CHANGE UP (ref 3.5–5.3)
POTASSIUM SERPL-SCNC: 4.2 MMOL/L — SIGNIFICANT CHANGE UP (ref 3.5–5.3)
PROT SERPL-MCNC: 6.4 G/DL — SIGNIFICANT CHANGE UP (ref 6–8.3)
RBC # BLD: 4.8 M/UL — SIGNIFICANT CHANGE UP (ref 4.2–5.8)
RBC # FLD: 13.2 % — SIGNIFICANT CHANGE UP (ref 10.3–14.5)
SODIUM SERPL-SCNC: 142 MMOL/L — SIGNIFICANT CHANGE UP (ref 135–145)
WBC # BLD: 5.37 K/UL — SIGNIFICANT CHANGE UP (ref 3.8–10.5)
WBC # FLD AUTO: 5.37 K/UL — SIGNIFICANT CHANGE UP (ref 3.8–10.5)

## 2019-10-23 PROCEDURE — 96413 CHEMO IV INFUSION 1 HR: CPT

## 2019-10-23 PROCEDURE — 36415 COLL VENOUS BLD VENIPUNCTURE: CPT

## 2019-10-23 PROCEDURE — 96415 CHEMO IV INFUSION ADDL HR: CPT

## 2019-10-23 PROCEDURE — 85027 COMPLETE CBC AUTOMATED: CPT

## 2019-10-23 PROCEDURE — 80076 HEPATIC FUNCTION PANEL: CPT

## 2019-10-23 PROCEDURE — 86140 C-REACTIVE PROTEIN: CPT

## 2019-10-23 PROCEDURE — 80048 BASIC METABOLIC PNL TOTAL CA: CPT

## 2019-10-23 RX ORDER — ACETAMINOPHEN 500 MG
650 TABLET ORAL ONCE
Refills: 0 | Status: COMPLETED | OUTPATIENT
Start: 2019-10-23 | End: 2019-10-23

## 2019-10-23 RX ORDER — INFLIXIMAB-DYYB 120 MG/ML
800 INJECTION SUBCUTANEOUS ONCE
Refills: 0 | Status: COMPLETED | OUTPATIENT
Start: 2019-10-23 | End: 2019-10-23

## 2019-10-23 RX ORDER — LORATADINE 10 MG/1
10 TABLET ORAL ONCE
Refills: 0 | Status: COMPLETED | OUTPATIENT
Start: 2019-10-23 | End: 2019-10-23

## 2019-10-23 RX ADMIN — Medication 650 MILLIGRAM(S): at 08:59

## 2019-10-23 RX ADMIN — Medication 650 MILLIGRAM(S): at 08:40

## 2019-10-23 RX ADMIN — INFLIXIMAB-DYYB 155 MILLIGRAM(S): 120 INJECTION SUBCUTANEOUS at 08:57

## 2019-10-23 RX ADMIN — LORATADINE 10 MILLIGRAM(S): 10 TABLET ORAL at 08:40

## 2019-10-23 RX ADMIN — INFLIXIMAB-DYYB 800 MILLIGRAM(S): 120 INJECTION SUBCUTANEOUS at 10:57

## 2019-11-04 ENCOUNTER — RX RENEWAL (OUTPATIENT)
Age: 29
End: 2019-11-04

## 2019-12-18 ENCOUNTER — APPOINTMENT (OUTPATIENT)
Age: 29
End: 2019-12-18

## 2019-12-18 ENCOUNTER — OUTPATIENT (OUTPATIENT)
Dept: OUTPATIENT SERVICES | Facility: HOSPITAL | Age: 29
LOS: 1 days | End: 2019-12-18
Payer: COMMERCIAL

## 2019-12-18 VITALS
OXYGEN SATURATION: 98 % | SYSTOLIC BLOOD PRESSURE: 137 MMHG | TEMPERATURE: 99 F | DIASTOLIC BLOOD PRESSURE: 77 MMHG | RESPIRATION RATE: 18 BRPM | HEART RATE: 87 BPM

## 2019-12-18 DIAGNOSIS — Z98.890 OTHER SPECIFIED POSTPROCEDURAL STATES: Chronic | ICD-10-CM

## 2019-12-18 DIAGNOSIS — K50.90 CROHN'S DISEASE, UNSPECIFIED, WITHOUT COMPLICATIONS: ICD-10-CM

## 2019-12-18 LAB
ALBUMIN SERPL ELPH-MCNC: 4.3 G/DL — SIGNIFICANT CHANGE UP (ref 3.3–5)
ALP SERPL-CCNC: 61 U/L — SIGNIFICANT CHANGE UP (ref 40–120)
ALT FLD-CCNC: 30 U/L — SIGNIFICANT CHANGE UP (ref 10–45)
ANION GAP SERPL CALC-SCNC: 10 MMOL/L — SIGNIFICANT CHANGE UP (ref 5–17)
AST SERPL-CCNC: 26 U/L — SIGNIFICANT CHANGE UP (ref 10–40)
BILIRUB DIRECT SERPL-MCNC: <0.2 MG/DL — SIGNIFICANT CHANGE UP (ref 0–0.2)
BILIRUB INDIRECT FLD-MCNC: >0.1 MG/DL — LOW (ref 0.2–1)
BILIRUB SERPL-MCNC: 0.3 MG/DL — SIGNIFICANT CHANGE UP (ref 0.2–1.2)
BUN SERPL-MCNC: 10 MG/DL — SIGNIFICANT CHANGE UP (ref 7–23)
CALCIUM SERPL-MCNC: 9.5 MG/DL — SIGNIFICANT CHANGE UP (ref 8.4–10.5)
CHLORIDE SERPL-SCNC: 105 MMOL/L — SIGNIFICANT CHANGE UP (ref 96–108)
CO2 SERPL-SCNC: 26 MMOL/L — SIGNIFICANT CHANGE UP (ref 22–31)
CREAT SERPL-MCNC: 0.91 MG/DL — SIGNIFICANT CHANGE UP (ref 0.5–1.3)
CRP SERPL-MCNC: 0.04 MG/DL — SIGNIFICANT CHANGE UP (ref 0–0.4)
GLUCOSE SERPL-MCNC: 118 MG/DL — HIGH (ref 70–99)
HCT VFR BLD CALC: 43.1 % — SIGNIFICANT CHANGE UP (ref 39–50)
HGB BLD-MCNC: 14 G/DL — SIGNIFICANT CHANGE UP (ref 13–17)
MCHC RBC-ENTMCNC: 29 PG — SIGNIFICANT CHANGE UP (ref 27–34)
MCHC RBC-ENTMCNC: 32.5 GM/DL — SIGNIFICANT CHANGE UP (ref 32–36)
MCV RBC AUTO: 89.2 FL — SIGNIFICANT CHANGE UP (ref 80–100)
NRBC # BLD: 0 /100 WBCS — SIGNIFICANT CHANGE UP (ref 0–0)
PLATELET # BLD AUTO: 234 K/UL — SIGNIFICANT CHANGE UP (ref 150–400)
POTASSIUM SERPL-MCNC: 4 MMOL/L — SIGNIFICANT CHANGE UP (ref 3.5–5.3)
POTASSIUM SERPL-SCNC: 4 MMOL/L — SIGNIFICANT CHANGE UP (ref 3.5–5.3)
PROT SERPL-MCNC: 6.9 G/DL — SIGNIFICANT CHANGE UP (ref 6–8.3)
RBC # BLD: 4.83 M/UL — SIGNIFICANT CHANGE UP (ref 4.2–5.8)
RBC # FLD: 12.1 % — SIGNIFICANT CHANGE UP (ref 10.3–14.5)
SODIUM SERPL-SCNC: 141 MMOL/L — SIGNIFICANT CHANGE UP (ref 135–145)
WBC # BLD: 6.55 K/UL — SIGNIFICANT CHANGE UP (ref 3.8–10.5)
WBC # FLD AUTO: 6.55 K/UL — SIGNIFICANT CHANGE UP (ref 3.8–10.5)

## 2019-12-18 PROCEDURE — 80048 BASIC METABOLIC PNL TOTAL CA: CPT

## 2019-12-18 PROCEDURE — 36415 COLL VENOUS BLD VENIPUNCTURE: CPT

## 2019-12-18 PROCEDURE — 85027 COMPLETE CBC AUTOMATED: CPT

## 2019-12-18 PROCEDURE — 96413 CHEMO IV INFUSION 1 HR: CPT

## 2019-12-18 PROCEDURE — 96415 CHEMO IV INFUSION ADDL HR: CPT

## 2019-12-18 PROCEDURE — 80076 HEPATIC FUNCTION PANEL: CPT

## 2019-12-18 PROCEDURE — 80230 DRUG ASSAY INFLIXIMAB: CPT

## 2019-12-18 PROCEDURE — 86140 C-REACTIVE PROTEIN: CPT

## 2019-12-18 RX ORDER — ACETAMINOPHEN 500 MG
650 TABLET ORAL ONCE
Refills: 0 | Status: COMPLETED | OUTPATIENT
Start: 2019-12-18 | End: 2019-12-18

## 2019-12-18 RX ORDER — INFLIXIMAB-DYYB 120 MG/ML
800 INJECTION SUBCUTANEOUS ONCE
Refills: 0 | Status: COMPLETED | OUTPATIENT
Start: 2019-12-18 | End: 2019-12-18

## 2019-12-18 RX ORDER — SERTRALINE 25 MG/1
0 TABLET, FILM COATED ORAL
Qty: 0 | Refills: 0 | DISCHARGE

## 2019-12-18 RX ORDER — LORATADINE 10 MG/1
10 TABLET ORAL ONCE
Refills: 0 | Status: COMPLETED | OUTPATIENT
Start: 2019-12-18 | End: 2019-12-18

## 2019-12-18 RX ADMIN — Medication 650 MILLIGRAM(S): at 16:27

## 2019-12-18 RX ADMIN — INFLIXIMAB-DYYB 155 MILLIGRAM(S): 120 INJECTION SUBCUTANEOUS at 16:30

## 2019-12-18 RX ADMIN — LORATADINE 10 MILLIGRAM(S): 10 TABLET ORAL at 16:27

## 2019-12-18 RX ADMIN — Medication 650 MILLIGRAM(S): at 16:31

## 2019-12-18 RX ADMIN — INFLIXIMAB-DYYB 800 MILLIGRAM(S): 120 INJECTION SUBCUTANEOUS at 18:30

## 2019-12-24 LAB
ANTIBODIES TO INFLIXIMAB (ATI) CONCENTRATION: < 3.1 U/ML — SIGNIFICANT CHANGE UP
PROMETHEUS ANSER IFX RESULT: SIGNIFICANT CHANGE UP
PROMETHEUS LABORATORY FOOTER: SIGNIFICANT CHANGE UP
SERUM INFLIXIMAB (IFX) CONCENTRATION: 8.5 UG/ML — SIGNIFICANT CHANGE UP

## 2020-01-09 ENCOUNTER — RX RENEWAL (OUTPATIENT)
Age: 30
End: 2020-01-09

## 2020-02-21 ENCOUNTER — APPOINTMENT (OUTPATIENT)
Age: 30
End: 2020-02-21

## 2020-02-21 ENCOUNTER — OUTPATIENT (OUTPATIENT)
Dept: OUTPATIENT SERVICES | Facility: HOSPITAL | Age: 30
LOS: 1 days | End: 2020-02-21
Payer: COMMERCIAL

## 2020-02-21 VITALS
SYSTOLIC BLOOD PRESSURE: 125 MMHG | RESPIRATION RATE: 18 BRPM | DIASTOLIC BLOOD PRESSURE: 72 MMHG | HEIGHT: 70 IN | TEMPERATURE: 99 F | WEIGHT: 179.9 LBS | HEART RATE: 82 BPM | OXYGEN SATURATION: 99 %

## 2020-02-21 VITALS
RESPIRATION RATE: 18 BRPM | HEART RATE: 88 BPM | TEMPERATURE: 99 F | SYSTOLIC BLOOD PRESSURE: 127 MMHG | OXYGEN SATURATION: 99 % | DIASTOLIC BLOOD PRESSURE: 78 MMHG

## 2020-02-21 DIAGNOSIS — Z98.890 OTHER SPECIFIED POSTPROCEDURAL STATES: Chronic | ICD-10-CM

## 2020-02-21 DIAGNOSIS — K50.90 CROHN'S DISEASE, UNSPECIFIED, WITHOUT COMPLICATIONS: ICD-10-CM

## 2020-02-21 LAB
ALBUMIN SERPL ELPH-MCNC: 4.4 G/DL — SIGNIFICANT CHANGE UP (ref 3.3–5)
ALP SERPL-CCNC: 57 U/L — SIGNIFICANT CHANGE UP (ref 40–120)
ALT FLD-CCNC: 27 U/L — SIGNIFICANT CHANGE UP (ref 10–45)
ANION GAP SERPL CALC-SCNC: 14 MMOL/L — SIGNIFICANT CHANGE UP (ref 5–17)
AST SERPL-CCNC: 27 U/L — SIGNIFICANT CHANGE UP (ref 10–40)
BILIRUB DIRECT SERPL-MCNC: <0.2 MG/DL — SIGNIFICANT CHANGE UP (ref 0–0.2)
BILIRUB INDIRECT FLD-MCNC: >0.1 MG/DL — LOW (ref 0.2–1)
BILIRUB SERPL-MCNC: 0.3 MG/DL — SIGNIFICANT CHANGE UP (ref 0.2–1.2)
BUN SERPL-MCNC: 12 MG/DL — SIGNIFICANT CHANGE UP (ref 7–23)
CALCIUM SERPL-MCNC: 9.6 MG/DL — SIGNIFICANT CHANGE UP (ref 8.4–10.5)
CHLORIDE SERPL-SCNC: 103 MMOL/L — SIGNIFICANT CHANGE UP (ref 96–108)
CO2 SERPL-SCNC: 25 MMOL/L — SIGNIFICANT CHANGE UP (ref 22–31)
CREAT SERPL-MCNC: 0.96 MG/DL — SIGNIFICANT CHANGE UP (ref 0.5–1.3)
CRP SERPL-MCNC: 0.04 MG/DL — SIGNIFICANT CHANGE UP (ref 0–0.4)
GLUCOSE SERPL-MCNC: 102 MG/DL — HIGH (ref 70–99)
HCT VFR BLD CALC: 46.7 % — SIGNIFICANT CHANGE UP (ref 39–50)
HGB BLD-MCNC: 15.3 G/DL — SIGNIFICANT CHANGE UP (ref 13–17)
MCHC RBC-ENTMCNC: 29.3 PG — SIGNIFICANT CHANGE UP (ref 27–34)
MCHC RBC-ENTMCNC: 32.8 GM/DL — SIGNIFICANT CHANGE UP (ref 32–36)
MCV RBC AUTO: 89.3 FL — SIGNIFICANT CHANGE UP (ref 80–100)
NRBC # BLD: 0 /100 WBCS — SIGNIFICANT CHANGE UP (ref 0–0)
PLATELET # BLD AUTO: 252 K/UL — SIGNIFICANT CHANGE UP (ref 150–400)
POTASSIUM SERPL-MCNC: 4.5 MMOL/L — SIGNIFICANT CHANGE UP (ref 3.5–5.3)
POTASSIUM SERPL-SCNC: 4.5 MMOL/L — SIGNIFICANT CHANGE UP (ref 3.5–5.3)
PROT SERPL-MCNC: 7.1 G/DL — SIGNIFICANT CHANGE UP (ref 6–8.3)
RBC # BLD: 5.23 M/UL — SIGNIFICANT CHANGE UP (ref 4.2–5.8)
RBC # FLD: 12.8 % — SIGNIFICANT CHANGE UP (ref 10.3–14.5)
SODIUM SERPL-SCNC: 142 MMOL/L — SIGNIFICANT CHANGE UP (ref 135–145)
WBC # BLD: 6.87 K/UL — SIGNIFICANT CHANGE UP (ref 3.8–10.5)
WBC # FLD AUTO: 6.87 K/UL — SIGNIFICANT CHANGE UP (ref 3.8–10.5)

## 2020-02-21 PROCEDURE — 86140 C-REACTIVE PROTEIN: CPT

## 2020-02-21 PROCEDURE — 80076 HEPATIC FUNCTION PANEL: CPT

## 2020-02-21 PROCEDURE — 36415 COLL VENOUS BLD VENIPUNCTURE: CPT

## 2020-02-21 PROCEDURE — 96415 CHEMO IV INFUSION ADDL HR: CPT

## 2020-02-21 PROCEDURE — 80048 BASIC METABOLIC PNL TOTAL CA: CPT

## 2020-02-21 PROCEDURE — 85027 COMPLETE CBC AUTOMATED: CPT

## 2020-02-21 PROCEDURE — 96413 CHEMO IV INFUSION 1 HR: CPT

## 2020-02-21 RX ORDER — LORATADINE 10 MG/1
10 TABLET ORAL ONCE
Refills: 0 | Status: COMPLETED | OUTPATIENT
Start: 2020-02-21 | End: 2020-02-21

## 2020-02-21 RX ORDER — INFLIXIMAB-DYYB 120 MG/ML
800 INJECTION SUBCUTANEOUS ONCE
Refills: 0 | Status: COMPLETED | OUTPATIENT
Start: 2020-02-21 | End: 2020-02-21

## 2020-02-21 RX ORDER — ACETAMINOPHEN 500 MG
650 TABLET ORAL ONCE
Refills: 0 | Status: COMPLETED | OUTPATIENT
Start: 2020-02-21 | End: 2020-02-21

## 2020-02-21 RX ADMIN — LORATADINE 10 MILLIGRAM(S): 10 TABLET ORAL at 15:20

## 2020-02-21 RX ADMIN — INFLIXIMAB-DYYB 800 MILLIGRAM(S): 120 INJECTION SUBCUTANEOUS at 17:49

## 2020-02-21 RX ADMIN — Medication 650 MILLIGRAM(S): at 15:20

## 2020-02-21 RX ADMIN — Medication 650 MILLIGRAM(S): at 16:13

## 2020-02-21 RX ADMIN — INFLIXIMAB-DYYB 155 MILLIGRAM(S): 120 INJECTION SUBCUTANEOUS at 15:49

## 2020-02-29 LAB
INFLIXIMAB AB SERPL-MCNC: 25 NG/ML — SIGNIFICANT CHANGE UP
INFLIXIMAB SERPL-MCNC: 3.3 UG/ML — SIGNIFICANT CHANGE UP

## 2020-03-16 ENCOUNTER — RX RENEWAL (OUTPATIENT)
Age: 30
End: 2020-03-16

## 2020-03-26 NOTE — ED ADULT NURSE NOTE - [id number]_ED_A_ED
Patient underwent CT simulation.     Tyree Remy M.D.  Department of Radiation Oncology  [facility]     
No

## 2020-04-06 ENCOUNTER — RX RENEWAL (OUTPATIENT)
Age: 30
End: 2020-04-06

## 2020-04-07 ENCOUNTER — APPOINTMENT (OUTPATIENT)
Age: 30
End: 2020-04-07

## 2020-04-15 ENCOUNTER — OUTPATIENT (OUTPATIENT)
Dept: OUTPATIENT SERVICES | Facility: HOSPITAL | Age: 30
LOS: 1 days | End: 2020-04-15
Payer: COMMERCIAL

## 2020-04-15 VITALS
HEART RATE: 62 BPM | RESPIRATION RATE: 18 BRPM | HEIGHT: 71 IN | OXYGEN SATURATION: 99 % | TEMPERATURE: 98 F | SYSTOLIC BLOOD PRESSURE: 129 MMHG | WEIGHT: 160.06 LBS | DIASTOLIC BLOOD PRESSURE: 78 MMHG

## 2020-04-15 DIAGNOSIS — K50.90 CROHN'S DISEASE, UNSPECIFIED, WITHOUT COMPLICATIONS: ICD-10-CM

## 2020-04-15 DIAGNOSIS — Z98.890 OTHER SPECIFIED POSTPROCEDURAL STATES: Chronic | ICD-10-CM

## 2020-04-15 LAB
ALBUMIN SERPL ELPH-MCNC: 4.2 G/DL — SIGNIFICANT CHANGE UP (ref 3.3–5)
ALP SERPL-CCNC: 50 U/L — SIGNIFICANT CHANGE UP (ref 40–120)
ALT FLD-CCNC: 27 U/L — SIGNIFICANT CHANGE UP (ref 10–45)
ANION GAP SERPL CALC-SCNC: 12 MMOL/L — SIGNIFICANT CHANGE UP (ref 5–17)
AST SERPL-CCNC: 24 U/L — SIGNIFICANT CHANGE UP (ref 10–40)
BILIRUB DIRECT SERPL-MCNC: <0.2 MG/DL — SIGNIFICANT CHANGE UP (ref 0–0.2)
BILIRUB INDIRECT FLD-MCNC: >0.3 MG/DL — SIGNIFICANT CHANGE UP (ref 0.2–1)
BILIRUB SERPL-MCNC: 0.5 MG/DL — SIGNIFICANT CHANGE UP (ref 0.2–1.2)
BUN SERPL-MCNC: 10 MG/DL — SIGNIFICANT CHANGE UP (ref 7–23)
CALCIUM SERPL-MCNC: 9.3 MG/DL — SIGNIFICANT CHANGE UP (ref 8.4–10.5)
CHLORIDE SERPL-SCNC: 104 MMOL/L — SIGNIFICANT CHANGE UP (ref 96–108)
CK SERPL-CCNC: 125 U/L — SIGNIFICANT CHANGE UP (ref 30–200)
CO2 SERPL-SCNC: 23 MMOL/L — SIGNIFICANT CHANGE UP (ref 22–31)
CREAT SERPL-MCNC: 0.93 MG/DL — SIGNIFICANT CHANGE UP (ref 0.5–1.3)
GLUCOSE SERPL-MCNC: 107 MG/DL — HIGH (ref 70–99)
HCT VFR BLD CALC: 46 % — SIGNIFICANT CHANGE UP (ref 39–50)
HGB BLD-MCNC: 15.3 G/DL — SIGNIFICANT CHANGE UP (ref 13–17)
MCHC RBC-ENTMCNC: 29.3 PG — SIGNIFICANT CHANGE UP (ref 27–34)
MCHC RBC-ENTMCNC: 33.3 GM/DL — SIGNIFICANT CHANGE UP (ref 32–36)
MCV RBC AUTO: 88.1 FL — SIGNIFICANT CHANGE UP (ref 80–100)
NRBC # BLD: 0 /100 WBCS — SIGNIFICANT CHANGE UP (ref 0–0)
PLATELET # BLD AUTO: 220 K/UL — SIGNIFICANT CHANGE UP (ref 150–400)
POTASSIUM SERPL-MCNC: 3.5 MMOL/L — SIGNIFICANT CHANGE UP (ref 3.5–5.3)
POTASSIUM SERPL-SCNC: 3.5 MMOL/L — SIGNIFICANT CHANGE UP (ref 3.5–5.3)
PROT SERPL-MCNC: 6.9 G/DL — SIGNIFICANT CHANGE UP (ref 6–8.3)
RBC # BLD: 5.22 M/UL — SIGNIFICANT CHANGE UP (ref 4.2–5.8)
RBC # FLD: 13.3 % — SIGNIFICANT CHANGE UP (ref 10.3–14.5)
SODIUM SERPL-SCNC: 139 MMOL/L — SIGNIFICANT CHANGE UP (ref 135–145)
WBC # BLD: 6.8 K/UL — SIGNIFICANT CHANGE UP (ref 3.8–10.5)
WBC # FLD AUTO: 6.8 K/UL — SIGNIFICANT CHANGE UP (ref 3.8–10.5)

## 2020-04-15 PROCEDURE — 80048 BASIC METABOLIC PNL TOTAL CA: CPT

## 2020-04-15 PROCEDURE — 80076 HEPATIC FUNCTION PANEL: CPT

## 2020-04-15 PROCEDURE — 82550 ASSAY OF CK (CPK): CPT

## 2020-04-15 PROCEDURE — 85027 COMPLETE CBC AUTOMATED: CPT

## 2020-04-15 PROCEDURE — 96413 CHEMO IV INFUSION 1 HR: CPT

## 2020-04-15 PROCEDURE — 36415 COLL VENOUS BLD VENIPUNCTURE: CPT

## 2020-04-15 PROCEDURE — 96415 CHEMO IV INFUSION ADDL HR: CPT

## 2020-04-15 RX ORDER — LORATADINE 10 MG/1
10 TABLET ORAL ONCE
Refills: 0 | Status: COMPLETED | OUTPATIENT
Start: 2020-04-15 | End: 2020-04-15

## 2020-04-15 RX ORDER — INFLIXIMAB-DYYB 120 MG/ML
800 INJECTION SUBCUTANEOUS ONCE
Refills: 0 | Status: COMPLETED | OUTPATIENT
Start: 2020-04-15 | End: 2020-04-15

## 2020-04-15 RX ORDER — ACETAMINOPHEN 500 MG
650 TABLET ORAL ONCE
Refills: 0 | Status: COMPLETED | OUTPATIENT
Start: 2020-04-15 | End: 2020-04-15

## 2020-04-15 RX ADMIN — INFLIXIMAB-DYYB 800 MILLIGRAM(S): 120 INJECTION SUBCUTANEOUS at 17:11

## 2020-04-15 RX ADMIN — LORATADINE 10 MILLIGRAM(S): 10 TABLET ORAL at 14:59

## 2020-04-15 RX ADMIN — INFLIXIMAB-DYYB 165 MILLIGRAM(S): 120 INJECTION SUBCUTANEOUS at 15:11

## 2020-04-15 RX ADMIN — Medication 650 MILLIGRAM(S): at 14:59

## 2020-04-23 LAB
INFLIXIMAB AB SERPL-MCNC: <22 NG/ML — SIGNIFICANT CHANGE UP
INFLIXIMAB SERPL-MCNC: 6.8 UG/ML — SIGNIFICANT CHANGE UP

## 2020-04-29 ENCOUNTER — APPOINTMENT (OUTPATIENT)
Dept: GASTROENTEROLOGY | Facility: CLINIC | Age: 30
End: 2020-04-29
Payer: COMMERCIAL

## 2020-04-29 PROCEDURE — 99213 OFFICE O/P EST LOW 20 MIN: CPT | Mod: 95

## 2020-04-30 NOTE — PHYSICAL EXAM
[General Appearance - Alert] : alert [General Appearance - In No Acute Distress] : in no acute distress [Sclera] : the sclera and conjunctiva were normal [] : no respiratory distress [No Spinal Tenderness] : no spinal tenderness [Skin Color & Pigmentation] : normal skin color and pigmentation [Oriented To Time, Place, And Person] : oriented to person, place, and time [FreeTextEntry1] : Thin built, appears chronically ill

## 2020-04-30 NOTE — HISTORY OF PRESENT ILLNESS
[Home] : at home, [unfilled] , at the time of the visit. [Other Location: e.g. Home (Enter Location, City,State)___] : at [unfilled] [Patient] : the patient [Heartburn] : denies heartburn [___ Month(s) Ago] : [unfilled] month(s) ago [Diarrhea] : stable diarrhea [Nausea] : denies nausea [Vomiting] : denies vomiting [Constipation] : stable constipation [Yellow Skin Or Eyes (Jaundice)] : denies jaundice [Abdominal Pain] : stable abdominal pain [Rectal Pain] : denies rectal pain [Abdominal Swelling] : denies abdominal swelling [Wt Gain ___ Lbs] : no recent weight gain [Wt Loss ___ Lbs] : no recent weight loss [de-identified] : 30 Y M, h/o CD, s/p SBR x 3 (initially for ileo-cecal intussusception 1990, then for ?GI bleed 2013 at OSH) and again in July 2018 for anastomotic bleed, with 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 His most recent cscopes and VCE revealed persistent anastomotic ulcers with normal colon mucosa.\par \par June 2019, patient's IFX dose was increased to 10mg/kg from 5mg/kg. After 2 sessions of the increased dose, he underwent a colonoscopy 9/5/19 to see whether there was any improvement in the ulcerations with higher dose of infliximab. Colonoscopy showed i1 disease, and anastatomic ulcer was still present friable with spontaneous oozing, seemed to have extended since prior colonoscopy. At that time discussed the possibility of a hyperbaric chamber with high dose oxygen as treatment for the ulcer, which has been tried for other indications. He was unable to find a place that accepted his insurance. \par \par Colonoscopy 9/5/19: side to side anastamosis, Leonard-TI with 2 apthous ulcers, Ruutgers score i1, anastamotic ulcer friable with spontaneous oozing. Seemed to have extended since prior colon despite increased IFX. Ulcer not likely inflammatory in nature. \par Pathology: unremarkable\par \par Patient reports he has not had any bleeding (when he does it gets very severe and requires hospitalization). He notes he has had 3 emergency bowel surgeries for bleeding as well as a surgery for intussusception at 1 years of age. The most recent surgeries have been at [facility] and [place] for intractible bleed. \par \par He continues to have 1-2 loose, non-bloody BMs/day that respond well to cholestyramine, though he notes it has been a little less effective lately. Has some abd discomfort before BMs but is improvement compared to pain with food. Reports this has been his baseline for years. Appetite is good, weight unchanged.  No melena. Denies nausea, vomiting, fever, chills. Working from home for [employer] in [place]. Keeping busy. Sleeping better on melatonin 1mg prn.\par \par EIMs: dry rash above eye after last IFX infusion, which resolved after topical steroid\par \par Reports he continues to smoke only 1 cigarette every 3 weeks. Drinks 2.5 glasses of wine few times a week. No cocaine use. \par \par CBC, CMP unremarkable from last infusion April 2020\par CRP WNL (never really was high)\par B12 was low normal in April 2019, taking oral B12 right now\par IFX drug levels reviewed and most recently Drug level 6, Ab <22\par \par -------------------------------------------------------------------------------------------------------------------\par PRIOR Hx - \par 28 Y M, hx of prior small bowel resection - first for ileo-cecal intussusception as an infant in 1990, and then distal SBR in 2013 for GI bleed; presents today s/p hospitalization and emergent weekend endoscopy for GI bleed and new diagnosis of Crohn's Disease. bx apparently from ileocolonic anastomosis, but VCE showed numerous diffuse sb aphthae.\par \par The patient had sx from his teen years, but moved around and didn't seek care during periods of wellness and also didn't have insurance coverage for a portion of the time.  Before moving to [place], he had several bleeding episodes, one which req. laparoscopic evaluation and resection of small bowel (in [place]) with unclear pathology.  NO one has told him he had any evidence of Crohn's in past. \par \par Pt was hospitalized in February and in April for melanic stools and anemia. He was transfused both admissions. He also had EGD/Colonoscopy and VCE (no reports available) that revealed multiple ulcers at prior ileo-cecal anastomosis w/biopsies that showed active chronic enteritis. \par EGD unrevealing.\par \best Pt was admitted to [facility] from 7/20-7/27/18 for recurrent LGIB 2/2 ileocolic anastomotic ulcer. He underwent a laparoscopic ileocolic resection with side-side anastomosis on 7/23/17. \par Since then, he has received two more loading doses of IFX. \par \par \par Prior biopsies of small bowel reveal active chronic enteritis with cryptitis and crypt architecture distortion. VCE revealed multiple ulcers in the jejunum and terminal ileum. \par Review of op report from [place] 2013 for GIB from anastamotic ulcer:\par 1. ileocectomy with Resection of previous ileal-ileal anastamosis\par 2. Mid ileum resection\par 3. Resection of previous jejunum to jeunum anastamosis; ?which had been bypassed; resected in setting of GIB. \par  \par Fam hx: no IBD or CRC\par Social hx: tobacco user 5x/week, alcohol use 5 drinks/week, marijuana use on occasion not weekly, NSAID use once/two weeks for HAs

## 2020-04-30 NOTE — ASSESSMENT
[FreeTextEntry1] : 30 Y M h/o small bowel CD diagnosed in 2018 (on EGD for GI bleed eval w/ + VCE findings), however h/o multiple SBRs (initially for ileo intussusception 1990, then for GI bleed from anastomotic ulcer 2013, followed by laparoscopic ileocolic resection for GI bleed 2/2 anastomotic ulcer in 7/2018), on monotherapy with IFX, with stable symptoms. Recent colonoscopy and VCE reveal persistent anastomotic ulceration and bleeding, (though no luminal inflammation), despite increased IFX dosing of 10mg/kg.  Certainly has aggressive disease phenotype (SB d/s, young age, multiple surgeries). Telemedicine visit conducted for chronic medical problems. \par \par # CD\par - will continue IFX at 10mg/kg and continue to monitor drug levels (discussed the dosing and given most recent level is on the low end of normal with higher dosing regimen, will keep for now). \par - we've continued to encourage him to cease his use of tobacco completely (though he states he's only smoking 1 cigarette every 3 weeks, minimize alcohol consumption and definitively avoid binge drinking (currently drinking 2.5 glasses of wine few times a week), (not using cocaine) to avoid vasoconstrictive and ischemic effects on GI system (he agrees)\par - No specific surgical approaches to this bleeding issue were identified at the prior IBD Conf where his case was discussed in detail.\par - plan for referral for hyperbaric chamber treatment - he will continue looking once pandemic slowing and offices are oepning again; We discussed at length that this is a logical approach given component of ischemia and absence of progression in luminal dz burden with high dose ifx. He was given referral for [facility] hyperbaric center however reports they did not accept his insurance \par \par # anemia - hgb stable \par - cont to f/u with Dr. Brar ; he's also r/o bleeding diathesis. \par - continue PO B12\par - iv iron with Dr. 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

## 2020-06-10 ENCOUNTER — TRANSCRIPTION ENCOUNTER (OUTPATIENT)
Age: 30
End: 2020-06-10

## 2020-06-16 ENCOUNTER — APPOINTMENT (OUTPATIENT)
Age: 30
End: 2020-06-16

## 2020-06-16 ENCOUNTER — OUTPATIENT (OUTPATIENT)
Dept: OUTPATIENT SERVICES | Facility: HOSPITAL | Age: 30
LOS: 1 days | End: 2020-06-16
Payer: COMMERCIAL

## 2020-06-16 VITALS
HEART RATE: 60 BPM | DIASTOLIC BLOOD PRESSURE: 63 MMHG | SYSTOLIC BLOOD PRESSURE: 111 MMHG | OXYGEN SATURATION: 97 % | TEMPERATURE: 97 F | RESPIRATION RATE: 18 BRPM

## 2020-06-16 VITALS
OXYGEN SATURATION: 97 % | DIASTOLIC BLOOD PRESSURE: 71 MMHG | HEIGHT: 71 IN | WEIGHT: 164.91 LBS | HEART RATE: 61 BPM | SYSTOLIC BLOOD PRESSURE: 119 MMHG | RESPIRATION RATE: 16 BRPM | TEMPERATURE: 98 F

## 2020-06-16 DIAGNOSIS — Z98.890 OTHER SPECIFIED POSTPROCEDURAL STATES: Chronic | ICD-10-CM

## 2020-06-16 DIAGNOSIS — K50.90 CROHN'S DISEASE, UNSPECIFIED, WITHOUT COMPLICATIONS: ICD-10-CM

## 2020-06-16 LAB
ALBUMIN SERPL ELPH-MCNC: 4 G/DL — SIGNIFICANT CHANGE UP (ref 3.3–5)
ALP SERPL-CCNC: 49 U/L — SIGNIFICANT CHANGE UP (ref 40–120)
ALT FLD-CCNC: 21 U/L — SIGNIFICANT CHANGE UP (ref 10–45)
ANION GAP SERPL CALC-SCNC: 11 MMOL/L — SIGNIFICANT CHANGE UP (ref 5–17)
AST SERPL-CCNC: 23 U/L — SIGNIFICANT CHANGE UP (ref 10–40)
BILIRUB DIRECT SERPL-MCNC: <0.2 MG/DL — SIGNIFICANT CHANGE UP (ref 0–0.2)
BILIRUB INDIRECT FLD-MCNC: >0.2 MG/DL — SIGNIFICANT CHANGE UP (ref 0.2–1)
BILIRUB SERPL-MCNC: 0.4 MG/DL — SIGNIFICANT CHANGE UP (ref 0.2–1.2)
BUN SERPL-MCNC: 13 MG/DL — SIGNIFICANT CHANGE UP (ref 7–23)
CALCIUM SERPL-MCNC: 9.3 MG/DL — SIGNIFICANT CHANGE UP (ref 8.4–10.5)
CHLORIDE SERPL-SCNC: 105 MMOL/L — SIGNIFICANT CHANGE UP (ref 96–108)
CO2 SERPL-SCNC: 24 MMOL/L — SIGNIFICANT CHANGE UP (ref 22–31)
CREAT SERPL-MCNC: 0.96 MG/DL — SIGNIFICANT CHANGE UP (ref 0.5–1.3)
CRP SERPL-MCNC: 0.06 MG/DL — SIGNIFICANT CHANGE UP (ref 0–0.4)
GLUCOSE SERPL-MCNC: 98 MG/DL — SIGNIFICANT CHANGE UP (ref 70–99)
HCT VFR BLD CALC: 43.6 % — SIGNIFICANT CHANGE UP (ref 39–50)
HGB BLD-MCNC: 14.4 G/DL — SIGNIFICANT CHANGE UP (ref 13–17)
MCHC RBC-ENTMCNC: 29.9 PG — SIGNIFICANT CHANGE UP (ref 27–34)
MCHC RBC-ENTMCNC: 33 GM/DL — SIGNIFICANT CHANGE UP (ref 32–36)
MCV RBC AUTO: 90.6 FL — SIGNIFICANT CHANGE UP (ref 80–100)
NRBC # BLD: 0 /100 WBCS — SIGNIFICANT CHANGE UP (ref 0–0)
PLATELET # BLD AUTO: 248 K/UL — SIGNIFICANT CHANGE UP (ref 150–400)
POTASSIUM SERPL-MCNC: 4.2 MMOL/L — SIGNIFICANT CHANGE UP (ref 3.5–5.3)
POTASSIUM SERPL-SCNC: 4.2 MMOL/L — SIGNIFICANT CHANGE UP (ref 3.5–5.3)
PROT SERPL-MCNC: 6.7 G/DL — SIGNIFICANT CHANGE UP (ref 6–8.3)
RBC # BLD: 4.81 M/UL — SIGNIFICANT CHANGE UP (ref 4.2–5.8)
RBC # FLD: 13.1 % — SIGNIFICANT CHANGE UP (ref 10.3–14.5)
SODIUM SERPL-SCNC: 140 MMOL/L — SIGNIFICANT CHANGE UP (ref 135–145)
WBC # BLD: 6.64 K/UL — SIGNIFICANT CHANGE UP (ref 3.8–10.5)
WBC # FLD AUTO: 6.64 K/UL — SIGNIFICANT CHANGE UP (ref 3.8–10.5)

## 2020-06-16 PROCEDURE — 80076 HEPATIC FUNCTION PANEL: CPT

## 2020-06-16 PROCEDURE — 96415 CHEMO IV INFUSION ADDL HR: CPT

## 2020-06-16 PROCEDURE — 86140 C-REACTIVE PROTEIN: CPT

## 2020-06-16 PROCEDURE — 96413 CHEMO IV INFUSION 1 HR: CPT

## 2020-06-16 PROCEDURE — 36415 COLL VENOUS BLD VENIPUNCTURE: CPT

## 2020-06-16 PROCEDURE — 80048 BASIC METABOLIC PNL TOTAL CA: CPT

## 2020-06-16 PROCEDURE — 85027 COMPLETE CBC AUTOMATED: CPT

## 2020-06-16 RX ORDER — INFLIXIMAB-DYYB 120 MG/ML
800 INJECTION SUBCUTANEOUS ONCE
Refills: 0 | Status: COMPLETED | OUTPATIENT
Start: 2020-06-16 | End: 2020-06-16

## 2020-06-16 RX ORDER — ACETAMINOPHEN 500 MG
650 TABLET ORAL ONCE
Refills: 0 | Status: COMPLETED | OUTPATIENT
Start: 2020-06-16 | End: 2020-06-16

## 2020-06-16 RX ORDER — LORATADINE 10 MG/1
10 TABLET ORAL ONCE
Refills: 0 | Status: COMPLETED | OUTPATIENT
Start: 2020-06-16 | End: 2020-06-16

## 2020-06-16 RX ADMIN — LORATADINE 10 MILLIGRAM(S): 10 TABLET ORAL at 14:35

## 2020-06-16 RX ADMIN — INFLIXIMAB-DYYB 155 MILLIGRAM(S): 120 INJECTION SUBCUTANEOUS at 14:50

## 2020-06-16 RX ADMIN — INFLIXIMAB-DYYB 800 MILLIGRAM(S): 120 INJECTION SUBCUTANEOUS at 16:50

## 2020-06-16 RX ADMIN — Medication 650 MILLIGRAM(S): at 14:35

## 2020-06-23 ENCOUNTER — RX RENEWAL (OUTPATIENT)
Age: 30
End: 2020-06-23

## 2020-06-24 LAB
INFLIXIMAB AB SERPL-MCNC: <22 NG/ML — SIGNIFICANT CHANGE UP
INFLIXIMAB SERPL-MCNC: 5.1 UG/ML — SIGNIFICANT CHANGE UP

## 2020-07-30 ENCOUNTER — APPOINTMENT (OUTPATIENT)
Dept: DERMATOLOGY | Facility: CLINIC | Age: 30
End: 2020-07-30
Payer: COMMERCIAL

## 2020-07-30 VITALS — TEMPERATURE: 99.2 F

## 2020-07-30 PROCEDURE — 99214 OFFICE O/P EST MOD 30 MIN: CPT

## 2020-07-30 NOTE — HISTORY OF PRESENT ILLNESS
[FreeTextEntry1] : moles [de-identified] : 29 yo M here for above. No personal history of skin cancer. Moisturizes with sunscreen regularly. Hx of Crohns disease and on infliximab x ~2 years. Spot on neck new, also spot R armpit that came off while wrestling then grew back.

## 2020-07-30 NOTE — PHYSICAL EXAM
[Alert] : alert [Oriented x 3] : ~L oriented x 3 [Well Nourished] : well nourished [Conjunctiva Non-injected] : conjunctiva non-injected [No Visual Lymphadenopathy] : no visual  lymphadenopathy [No Clubbing] : no clubbing [No Edema] : no edema [No Bromhidrosis] : no bromhidrosis [No Chromhidrosis] : no chromhidrosis [Full Body Skin Exam Performed] : performed [FreeTextEntry3] : fair skin, lentigines\par stuck on brown papules right axilla and neck

## 2020-08-12 ENCOUNTER — TRANSCRIPTION ENCOUNTER (OUTPATIENT)
Age: 30
End: 2020-08-12

## 2020-08-18 ENCOUNTER — APPOINTMENT (OUTPATIENT)
Age: 30
End: 2020-08-18

## 2020-08-18 ENCOUNTER — OUTPATIENT (OUTPATIENT)
Dept: OUTPATIENT SERVICES | Facility: HOSPITAL | Age: 30
LOS: 1 days | End: 2020-08-18
Payer: COMMERCIAL

## 2020-08-18 VITALS
TEMPERATURE: 98 F | RESPIRATION RATE: 18 BRPM | OXYGEN SATURATION: 97 % | HEART RATE: 66 BPM | SYSTOLIC BLOOD PRESSURE: 97 MMHG | DIASTOLIC BLOOD PRESSURE: 57 MMHG

## 2020-08-18 VITALS
RESPIRATION RATE: 18 BRPM | DIASTOLIC BLOOD PRESSURE: 65 MMHG | HEART RATE: 59 BPM | OXYGEN SATURATION: 98 % | SYSTOLIC BLOOD PRESSURE: 102 MMHG | TEMPERATURE: 98 F

## 2020-08-18 DIAGNOSIS — Z98.890 OTHER SPECIFIED POSTPROCEDURAL STATES: Chronic | ICD-10-CM

## 2020-08-18 DIAGNOSIS — K50.90 CROHN'S DISEASE, UNSPECIFIED, WITHOUT COMPLICATIONS: ICD-10-CM

## 2020-08-18 LAB
ALBUMIN SERPL ELPH-MCNC: 3.8 G/DL — SIGNIFICANT CHANGE UP (ref 3.3–5)
ALP SERPL-CCNC: 40 U/L — SIGNIFICANT CHANGE UP (ref 40–120)
ALT FLD-CCNC: 19 U/L — SIGNIFICANT CHANGE UP (ref 10–45)
ANION GAP SERPL CALC-SCNC: 10 MMOL/L — SIGNIFICANT CHANGE UP (ref 5–17)
AST SERPL-CCNC: 23 U/L — SIGNIFICANT CHANGE UP (ref 10–40)
BILIRUB DIRECT SERPL-MCNC: <0.2 MG/DL — SIGNIFICANT CHANGE UP (ref 0–0.2)
BILIRUB INDIRECT FLD-MCNC: SIGNIFICANT CHANGE UP MG/DL (ref 0.2–1.2)
BILIRUB SERPL-MCNC: 0.2 MG/DL — SIGNIFICANT CHANGE UP (ref 0.2–1.2)
BUN SERPL-MCNC: 11 MG/DL — SIGNIFICANT CHANGE UP (ref 7–23)
CALCIUM SERPL-MCNC: 9 MG/DL — SIGNIFICANT CHANGE UP (ref 8.4–10.5)
CHLORIDE SERPL-SCNC: 105 MMOL/L — SIGNIFICANT CHANGE UP (ref 96–108)
CO2 SERPL-SCNC: 25 MMOL/L — SIGNIFICANT CHANGE UP (ref 22–31)
CREAT SERPL-MCNC: 0.96 MG/DL — SIGNIFICANT CHANGE UP (ref 0.5–1.3)
CRP SERPL-MCNC: <0.03 MG/DL — SIGNIFICANT CHANGE UP (ref 0–0.4)
GLUCOSE SERPL-MCNC: 103 MG/DL — HIGH (ref 70–99)
HCT VFR BLD CALC: 39.7 % — SIGNIFICANT CHANGE UP (ref 39–50)
HGB BLD-MCNC: 13.1 G/DL — SIGNIFICANT CHANGE UP (ref 13–17)
MCHC RBC-ENTMCNC: 29.8 PG — SIGNIFICANT CHANGE UP (ref 27–34)
MCHC RBC-ENTMCNC: 33 GM/DL — SIGNIFICANT CHANGE UP (ref 32–36)
MCV RBC AUTO: 90.4 FL — SIGNIFICANT CHANGE UP (ref 80–100)
NRBC # BLD: 0 /100 WBCS — SIGNIFICANT CHANGE UP (ref 0–0)
PLATELET # BLD AUTO: 215 K/UL — SIGNIFICANT CHANGE UP (ref 150–400)
POTASSIUM SERPL-MCNC: 3.8 MMOL/L — SIGNIFICANT CHANGE UP (ref 3.5–5.3)
POTASSIUM SERPL-SCNC: 3.8 MMOL/L — SIGNIFICANT CHANGE UP (ref 3.5–5.3)
PROT SERPL-MCNC: 6.5 G/DL — SIGNIFICANT CHANGE UP (ref 6–8.3)
RBC # BLD: 4.39 M/UL — SIGNIFICANT CHANGE UP (ref 4.2–5.8)
RBC # FLD: 13.2 % — SIGNIFICANT CHANGE UP (ref 10.3–14.5)
SODIUM SERPL-SCNC: 140 MMOL/L — SIGNIFICANT CHANGE UP (ref 135–145)
WBC # BLD: 6.71 K/UL — SIGNIFICANT CHANGE UP (ref 3.8–10.5)
WBC # FLD AUTO: 6.71 K/UL — SIGNIFICANT CHANGE UP (ref 3.8–10.5)

## 2020-08-18 PROCEDURE — 86140 C-REACTIVE PROTEIN: CPT

## 2020-08-18 PROCEDURE — 80048 BASIC METABOLIC PNL TOTAL CA: CPT

## 2020-08-18 PROCEDURE — 96415 CHEMO IV INFUSION ADDL HR: CPT

## 2020-08-18 PROCEDURE — 36415 COLL VENOUS BLD VENIPUNCTURE: CPT

## 2020-08-18 PROCEDURE — 96413 CHEMO IV INFUSION 1 HR: CPT

## 2020-08-18 PROCEDURE — 80230 DRUG ASSAY INFLIXIMAB: CPT

## 2020-08-18 PROCEDURE — 80076 HEPATIC FUNCTION PANEL: CPT

## 2020-08-18 PROCEDURE — 85027 COMPLETE CBC AUTOMATED: CPT

## 2020-08-18 RX ORDER — ACETAMINOPHEN 500 MG
650 TABLET ORAL ONCE
Refills: 0 | Status: COMPLETED | OUTPATIENT
Start: 2020-08-18 | End: 2020-08-18

## 2020-08-18 RX ORDER — INFLIXIMAB-DYYB 120 MG/ML
800 INJECTION SUBCUTANEOUS ONCE
Refills: 0 | Status: COMPLETED | OUTPATIENT
Start: 2020-08-18 | End: 2020-08-18

## 2020-08-18 RX ORDER — LORATADINE 10 MG/1
10 TABLET ORAL ONCE
Refills: 0 | Status: COMPLETED | OUTPATIENT
Start: 2020-08-18 | End: 2020-08-18

## 2020-08-18 RX ADMIN — Medication 650 MILLIGRAM(S): at 09:10

## 2020-08-18 RX ADMIN — INFLIXIMAB-DYYB 800 MILLIGRAM(S): 120 INJECTION SUBCUTANEOUS at 10:45

## 2020-08-18 RX ADMIN — Medication 650 MILLIGRAM(S): at 08:40

## 2020-08-18 RX ADMIN — INFLIXIMAB-DYYB 155 MILLIGRAM(S): 120 INJECTION SUBCUTANEOUS at 08:45

## 2020-08-18 RX ADMIN — LORATADINE 10 MILLIGRAM(S): 10 TABLET ORAL at 08:40

## 2020-08-27 LAB
ANTIBODIES TO INFLIXIMAB (ATI) CONCENTRATION: < 3.1 U/ML — SIGNIFICANT CHANGE UP
PROMETHEUS ANSER IFX RESULT: SIGNIFICANT CHANGE UP
PROMETHEUS LABORATORY FOOTER: SIGNIFICANT CHANGE UP
SERUM INFLIXIMAB (IFX) CONCENTRATION: 4 UG/ML — SIGNIFICANT CHANGE UP

## 2020-09-09 ENCOUNTER — TRANSCRIPTION ENCOUNTER (OUTPATIENT)
Age: 30
End: 2020-09-09

## 2020-10-13 ENCOUNTER — TRANSCRIPTION ENCOUNTER (OUTPATIENT)
Age: 30
End: 2020-10-13

## 2020-10-16 ENCOUNTER — APPOINTMENT (OUTPATIENT)
Dept: GASTROENTEROLOGY | Facility: CLINIC | Age: 30
End: 2020-10-16
Payer: COMMERCIAL

## 2020-10-16 ENCOUNTER — OUTPATIENT (OUTPATIENT)
Dept: OUTPATIENT SERVICES | Facility: HOSPITAL | Age: 30
LOS: 1 days | End: 2020-10-16
Payer: COMMERCIAL

## 2020-10-16 ENCOUNTER — APPOINTMENT (OUTPATIENT)
Age: 30
End: 2020-10-16

## 2020-10-16 VITALS
HEART RATE: 94 BPM | OXYGEN SATURATION: 98 % | SYSTOLIC BLOOD PRESSURE: 100 MMHG | TEMPERATURE: 97.6 F | BODY MASS INDEX: 21.98 KG/M2 | HEIGHT: 71 IN | DIASTOLIC BLOOD PRESSURE: 80 MMHG | RESPIRATION RATE: 16 BRPM | WEIGHT: 157 LBS

## 2020-10-16 VITALS
RESPIRATION RATE: 18 BRPM | TEMPERATURE: 97 F | HEART RATE: 73 BPM | SYSTOLIC BLOOD PRESSURE: 113 MMHG | DIASTOLIC BLOOD PRESSURE: 71 MMHG | OXYGEN SATURATION: 98 %

## 2020-10-16 VITALS
TEMPERATURE: 97 F | RESPIRATION RATE: 18 BRPM | HEART RATE: 77 BPM | DIASTOLIC BLOOD PRESSURE: 72 MMHG | OXYGEN SATURATION: 97 % | SYSTOLIC BLOOD PRESSURE: 106 MMHG

## 2020-10-16 DIAGNOSIS — K50.90 CROHN'S DISEASE, UNSPECIFIED, WITHOUT COMPLICATIONS: ICD-10-CM

## 2020-10-16 DIAGNOSIS — R10.13 EPIGASTRIC PAIN: ICD-10-CM

## 2020-10-16 DIAGNOSIS — Z98.890 OTHER SPECIFIED POSTPROCEDURAL STATES: Chronic | ICD-10-CM

## 2020-10-16 LAB
ALBUMIN SERPL ELPH-MCNC: 4.2 G/DL — SIGNIFICANT CHANGE UP (ref 3.3–5)
ALP SERPL-CCNC: 53 U/L — SIGNIFICANT CHANGE UP (ref 40–120)
ALT FLD-CCNC: 18 U/L — SIGNIFICANT CHANGE UP (ref 10–45)
ANION GAP SERPL CALC-SCNC: 12 MMOL/L — SIGNIFICANT CHANGE UP (ref 5–17)
AST SERPL-CCNC: 23 U/L — SIGNIFICANT CHANGE UP (ref 10–40)
BILIRUB DIRECT SERPL-MCNC: <0.2 MG/DL — SIGNIFICANT CHANGE UP (ref 0–0.2)
BILIRUB INDIRECT FLD-MCNC: SIGNIFICANT CHANGE UP MG/DL (ref 0.2–1)
BILIRUB SERPL-MCNC: 0.5 MG/DL — SIGNIFICANT CHANGE UP (ref 0.2–1.2)
BUN SERPL-MCNC: 13 MG/DL — SIGNIFICANT CHANGE UP (ref 7–23)
CALCIUM SERPL-MCNC: 9.1 MG/DL — SIGNIFICANT CHANGE UP (ref 8.4–10.5)
CHLORIDE SERPL-SCNC: 106 MMOL/L — SIGNIFICANT CHANGE UP (ref 96–108)
CO2 SERPL-SCNC: 22 MMOL/L — SIGNIFICANT CHANGE UP (ref 22–31)
CREAT SERPL-MCNC: 0.91 MG/DL — SIGNIFICANT CHANGE UP (ref 0.5–1.3)
CRP SERPL-MCNC: 0.04 MG/DL — SIGNIFICANT CHANGE UP (ref 0–0.4)
GLUCOSE SERPL-MCNC: 104 MG/DL — HIGH (ref 70–99)
HCT VFR BLD CALC: 43.4 % — SIGNIFICANT CHANGE UP (ref 39–50)
HGB BLD-MCNC: 14.5 G/DL — SIGNIFICANT CHANGE UP (ref 13–17)
MCHC RBC-ENTMCNC: 29.2 PG — SIGNIFICANT CHANGE UP (ref 27–34)
MCHC RBC-ENTMCNC: 33.4 GM/DL — SIGNIFICANT CHANGE UP (ref 32–36)
MCV RBC AUTO: 87.5 FL — SIGNIFICANT CHANGE UP (ref 80–100)
NRBC # BLD: 0 /100 WBCS — SIGNIFICANT CHANGE UP (ref 0–0)
PLATELET # BLD AUTO: 210 K/UL — SIGNIFICANT CHANGE UP (ref 150–400)
POTASSIUM SERPL-MCNC: 4 MMOL/L — SIGNIFICANT CHANGE UP (ref 3.5–5.3)
POTASSIUM SERPL-SCNC: 4 MMOL/L — SIGNIFICANT CHANGE UP (ref 3.5–5.3)
PROT SERPL-MCNC: 6.8 G/DL — SIGNIFICANT CHANGE UP (ref 6–8.3)
RBC # BLD: 4.96 M/UL — SIGNIFICANT CHANGE UP (ref 4.2–5.8)
RBC # FLD: 15.2 % — HIGH (ref 10.3–14.5)
SODIUM SERPL-SCNC: 140 MMOL/L — SIGNIFICANT CHANGE UP (ref 135–145)
WBC # BLD: 6.86 K/UL — SIGNIFICANT CHANGE UP (ref 3.8–10.5)
WBC # FLD AUTO: 6.86 K/UL — SIGNIFICANT CHANGE UP (ref 3.8–10.5)

## 2020-10-16 PROCEDURE — 96415 CHEMO IV INFUSION ADDL HR: CPT

## 2020-10-16 PROCEDURE — 96413 CHEMO IV INFUSION 1 HR: CPT

## 2020-10-16 PROCEDURE — 80048 BASIC METABOLIC PNL TOTAL CA: CPT

## 2020-10-16 PROCEDURE — 36415 COLL VENOUS BLD VENIPUNCTURE: CPT

## 2020-10-16 PROCEDURE — 80076 HEPATIC FUNCTION PANEL: CPT

## 2020-10-16 PROCEDURE — 99214 OFFICE O/P EST MOD 30 MIN: CPT

## 2020-10-16 PROCEDURE — 80230 DRUG ASSAY INFLIXIMAB: CPT

## 2020-10-16 PROCEDURE — 85027 COMPLETE CBC AUTOMATED: CPT

## 2020-10-16 PROCEDURE — 86140 C-REACTIVE PROTEIN: CPT

## 2020-10-16 RX ORDER — INFLIXIMAB-DYYB 120 MG/ML
800 INJECTION SUBCUTANEOUS ONCE
Refills: 0 | Status: COMPLETED | OUTPATIENT
Start: 2020-10-16 | End: 2020-10-16

## 2020-10-16 RX ORDER — CHLORHEXIDINE GLUCONATE 4 %
1000 LIQUID (ML) TOPICAL
Qty: 30 | Refills: 2 | Status: DISCONTINUED | COMMUNITY
Start: 2020-01-09 | End: 2020-10-16

## 2020-10-16 RX ORDER — ACETAMINOPHEN 500 MG
650 TABLET ORAL ONCE
Refills: 0 | Status: COMPLETED | OUTPATIENT
Start: 2020-10-16 | End: 2020-10-16

## 2020-10-16 RX ORDER — LORATADINE 10 MG/1
10 TABLET ORAL ONCE
Refills: 0 | Status: COMPLETED | OUTPATIENT
Start: 2020-10-16 | End: 2020-10-16

## 2020-10-16 RX ADMIN — Medication 650 MILLIGRAM(S): at 09:00

## 2020-10-16 RX ADMIN — INFLIXIMAB-DYYB 155 MILLIGRAM(S): 120 INJECTION SUBCUTANEOUS at 09:00

## 2020-10-16 RX ADMIN — LORATADINE 10 MILLIGRAM(S): 10 TABLET ORAL at 09:00

## 2020-10-16 RX ADMIN — INFLIXIMAB-DYYB 800 MILLIGRAM(S): 120 INJECTION SUBCUTANEOUS at 11:00

## 2020-10-16 RX ADMIN — Medication 650 MILLIGRAM(S): at 09:30

## 2020-10-16 NOTE — PHYSICAL EXAM
[FreeTextEntry1] : Thin built, appears chronically ill  [General Appearance - Alert] : alert [General Appearance - In No Acute Distress] : in no acute distress [Sclera] : the sclera and conjunctiva were normal [Outer Ear] : the ears and nose were normal in appearance [Neck Appearance] : the appearance of the neck was normal [Neck Cervical Mass (___cm)] : no neck mass was observed [Respiration, Rhythm And Depth] : normal respiratory rhythm and effort [Bowel Sounds] : normal bowel sounds [Exaggerated Use Of Accessory Muscles For Inspiration] : no accessory muscle use [Abdomen Tenderness] : non-tender [Abdomen Soft] : soft [] : no rash [Skin Color & Pigmentation] : normal skin color and pigmentation [No Spinal Tenderness] : no spinal tenderness [Affect] : the affect was normal [Oriented To Time, Place, And Person] : oriented to person, place, and time [Skin Lesions] : no skin lesions [Mood] : the mood was normal

## 2020-10-16 NOTE — HISTORY OF PRESENT ILLNESS
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 His most recent cscopes and VCE revealed persistent anastomotic ulcers with normal colon mucosa.\par \par June 2019, patient's IFX dose was increased to 10mg/kg from 5mg/kg. After 2 sessions of the increased dose, he underwent a colonoscopy 9/5/19 to see whether there was any improvement in the ulcerations with higher dose of infliximab. Colonoscopy showed i1 disease, and anastatomic ulcer was still present friable with spontaneous oozing, seemed to have extended since prior colonoscopy.  At that time discussed the possibility of a hyperbaric chamber with high dose oxygen as treatment for the ulcer, which has been tried for other indications. He was unable to find a place that accepted his insurance. \par He is working with a dietician and is interested in starting digestive enzymes, probiotics and multivitamin.\par \par Colonoscopy 9/5/19: side to side anastamosis, Leonard-TI with 2 apthous ulcers, Ruutgers score i1, anastamotic ulcer friable with spontaneous oozing. Seemed to have extended since prior colon despite increased IFX. Ulcer not likely inflammatory in nature. \par Pathology: unremarkable\par \par Patient reports he has not had any bleeding (when he does it gets very severe and requires hospitalization). He notes he has had 3 emergency bowel surgeries for bleeding as well as a surgery for intussusception at 1 years of age. The most recent surgeries have been at [facility] and [place] for intractable bleed. \par \par He continues to have 1-2 loose, non-bloody BMs/day that respond well to cholestyramine. He takes 1/2 packet twice daily, avoiding other meds within 4 hours of administration. Has some abd discomfort before BMs but is improvement compared to pain with food. Reports this has been his baseline for years. Appetite is good, weight unchanged.  No melena. Denies nausea, vomiting, fever, chills. Working from home for [employer] in [place]. Keeping busy. Sleeping better on melatonin 1mg prn.\par \par EIMs: dry rash above eye after last IFX infusion, which resolved after topical steroid\par \par Reports he continues to smoke 1 cigarette every week. Drinks 2.5 glasses of wine few times a week. No cocaine use. \par \par CBC, CMP unremarkable from today's infusion \par CRP WNL (never really was high)\par B12 was low normal in April 2019, taking oral B12 right now\par IFX drug levels reviewed and most recently Drug level 6, Ab <22\par \par -------------------------------------------------------------------------------------------------------------------\par PRIOR Hx - \par 28 Y M, hx of prior small bowel resection - first for ileo-cecal intussusception as an infant in 1990, and then distal SBR in 2013 for GI bleed; presents today s/p hospitalization and emergent weekend endoscopy for GI bleed and new diagnosis of Crohn's Disease. bx apparently from ileocolonic anastomosis, but VCE showed numerous diffuse sb aphthae.\par \par The patient had sx from his teen years, but moved around and didn't seek care during periods of wellness and also didn't have insurance coverage for a portion of the time.  Before moving to [place], he had several bleeding episodes, one which req. laparoscopic evaluation and resection of small bowel (in [place]) with unclear pathology.  NO one has told him he had any evidence of Crohn's in past. \par \par Pt was hospitalized in February and in April for melanic stools and anemia. He was transfused both admissions. He also had EGD/Colonoscopy and VCE (no reports available) that revealed multiple ulcers at prior ileo-cecal anastomosis w/biopsies that showed active chronic enteritis. \par EGD unrevealing.\par \par Pt was admitted to [facility] from 7/20-7/27/18 for recurrent LGIB 2/2 ileocolic anastomotic ulcer. He underwent a laparoscopic ileocolic resection with side-side anastomosis on 7/23/17. \par Since then, he has received two more loading doses of IFX. \par \par \par Prior biopsies of small bowel reveal active chronic enteritis with cryptitis and crypt architecture distortion. VCE revealed multiple ulcers in the jejunum and terminal ileum. \par Review of op report from [place] 2013 for GIB from anastamotic ulcer:\par 1. ileocectomy with Resection of previous ileal-ileal anastamosis\par 2. Mid ileum resection\par 3. Resection of previous jejunum to jeunum anastamosis; ?which had been bypassed; resected in setting of GIB. \par  \par Fam hx: no IBD or CRC\par Social hx: tobacco user 5x/week, alcohol use 5 drinks/week, marijuana use on occasion not weekly, NSAID use once/two weeks for HAs

## 2020-10-16 NOTE — ASSESSMENT
[FreeTextEntry1] : 30 Y M h/o small bowel CD diagnosed in 2018 (on EGD for GI bleed eval w/ + VCE findings), however h/o multiple SBRs (initially for ileo intussusception 1990, then for GI bleed from anastomotic ulcer 2013, followed by laparoscopic ileocolic resection for GI bleed 2/2 anastomotic ulcer in 7/2018), on monotherapy with IFX, with stable symptoms. Recent colonoscopy and VCE reveal persistent anastomotic ulceration and bleeding, (though no luminal inflammation), despite increased IFX dosing of 10mg/kg.  Certainly has aggressive disease phenotype (SB d/s, young age, multiple surgeries). \par \par # CD\par - will continue IFX at 10mg/kg and continue to monitor drug levels\par - we've continued to encourage him to cease his use of tobacco completely (though he states he's only smoking 1 cigarette ever wk, minimize alcohol consumption and definitively avoid binge drinking (currently drinking 2.5 glasses of wine few times a week), (not using cocaine) to avoid vasoconstrictive and ischemic effects on GI system (he agrees)\par - No specific surgical approaches to this bleeding issue were identified at the prior IBD Conf where his case was discussed in detail.\par - plan for referral for hyperbaric chamber treatment - he will restart search after cscope; We discussed at length that this is a logical approach given component of ischemia and absence of progression in luminal dz burden with high dose ifx. He was given referral for [facility] hyperbaric center however reports they did not accept his insurance \par \par # anemia - hgb stable \par - cont to f/u with Dr. Brar ; he's also r/o bleeding diathesis. \par - continue PO B12\par - iv iron with Dr. 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 Brar \par - immune to Hep B, consider checking Hep A immunity with next infusion\par - TB negative 2018 \par - UTD derm FSBE\par \par F/U post-procedure

## 2020-10-22 LAB
ANTIBODIES TO INFLIXIMAB (ATI) CONCENTRATION: < 3.1 U/ML — SIGNIFICANT CHANGE UP
PROMETHEUS ANSER IFX RESULT: SIGNIFICANT CHANGE UP
PROMETHEUS LABORATORY FOOTER: SIGNIFICANT CHANGE UP
SERUM INFLIXIMAB (IFX) CONCENTRATION: 4.3 UG/ML — SIGNIFICANT CHANGE UP

## 2020-11-03 ENCOUNTER — TRANSCRIPTION ENCOUNTER (OUTPATIENT)
Age: 30
End: 2020-11-03

## 2020-11-20 NOTE — PROGRESS NOTE ADULT - ATTENDING COMMENTS
Patient called the office back.  He states he had scheduled an appointment to see Dr. Rodriguez on 12/17/20 but is calling to see if he should move that appointment up.  He has been seeing a counselor intermittently for the past few years.  Recently the counselor had recommended he talk to Dr. Rodriguez about the possibility of medication to help with his depression and anxiety.  He has not previously taken any BH medications for depression or anxiety.   He states he doesn't think that his Sx are worse now than they were before or that his Sx were previously better controlled.  He reports these depression and anxiety symptoms have been present for the better part of his adult life.  Some depression symptoms but seems more anxiety related concerns.  Depression sx r/t feelings of inadequacy or not living up to his potential.  Denies any thoughts or plans of self harm, but does admit to an occasional passing thought of things being better off without him.  Writer discussed recommendations with him for an OV with Dr. Rodriguez to discuss further.  He was in agreement with an appt to see Dr. Rodriguez on Monday, 11/23/20 at 0840.  Appt scheduled.  COVID screen negative.  Writer did also discuss with him being seen in the ER or contacting crisis center if having worsening depression or anxiety symptoms.  He declined the Crisis Center number stating he doesn't think that will be necessary.     After discussion with Dr. Boo, will delay IFX loading #3 until after his post op visit in a week.   Brooks for office visit Aug 6th and infusion following.   We will arrange this GI follow up.

## 2020-12-04 ENCOUNTER — EMERGENCY (EMERGENCY)
Facility: HOSPITAL | Age: 30
LOS: 1 days | Discharge: HOME | End: 2020-12-04
Admitting: EMERGENCY MEDICINE
Payer: COMMERCIAL

## 2020-12-04 ENCOUNTER — RESULT REVIEW (OUTPATIENT)
Age: 30
End: 2020-12-04

## 2020-12-04 ENCOUNTER — OUTPATIENT (OUTPATIENT)
Dept: OUTPATIENT SERVICES | Facility: HOSPITAL | Age: 30
LOS: 1 days | Discharge: HOME | End: 2020-12-04
Payer: COMMERCIAL

## 2020-12-04 ENCOUNTER — APPOINTMENT (OUTPATIENT)
Dept: GASTROENTEROLOGY | Facility: HOSPITAL | Age: 30
End: 2020-12-04

## 2020-12-04 VITALS
HEART RATE: 90 BPM | HEIGHT: 71 IN | RESPIRATION RATE: 16 BRPM | TEMPERATURE: 98 F | SYSTOLIC BLOOD PRESSURE: 135 MMHG | OXYGEN SATURATION: 97 % | DIASTOLIC BLOOD PRESSURE: 96 MMHG

## 2020-12-04 DIAGNOSIS — Z20.828 CONTACT WITH AND (SUSPECTED) EXPOSURE TO OTHER VIRAL COMMUNICABLE DISEASES: ICD-10-CM

## 2020-12-04 DIAGNOSIS — Z98.890 OTHER SPECIFIED POSTPROCEDURAL STATES: Chronic | ICD-10-CM

## 2020-12-04 LAB — SARS-COV-2 RNA SPEC QL NAA+PROBE: SIGNIFICANT CHANGE UP

## 2020-12-04 PROCEDURE — 87635 SARS-COV-2 COVID-19 AMP PRB: CPT

## 2020-12-04 PROCEDURE — 99283 EMERGENCY DEPT VISIT LOW MDM: CPT

## 2020-12-04 PROCEDURE — 45380 COLONOSCOPY AND BIOPSY: CPT

## 2020-12-04 PROCEDURE — 88305 TISSUE EXAM BY PATHOLOGIST: CPT | Mod: 26

## 2020-12-04 NOTE — ED PROVIDER NOTE - PATIENT PORTAL LINK FT
You can access the FollowMyHealth Patient Portal offered by [facility] by registering at the following website: [email or web address]. By joining [employer]’s FollowMyHealth portal, you will also be able to view your health information using other applications (apps) compatible with our system.

## 2020-12-07 LAB — SURGICAL PATHOLOGY STUDY: SIGNIFICANT CHANGE UP

## 2020-12-08 DIAGNOSIS — K50.90 CROHN'S DISEASE, UNSPECIFIED, WITHOUT COMPLICATIONS: ICD-10-CM

## 2020-12-08 DIAGNOSIS — Z98.0 INTESTINAL BYPASS AND ANASTOMOSIS STATUS: ICD-10-CM

## 2020-12-08 DIAGNOSIS — F17.200 NICOTINE DEPENDENCE, UNSPECIFIED, UNCOMPLICATED: ICD-10-CM

## 2020-12-08 DIAGNOSIS — K27.9 PEPTIC ULCER, SITE UNSPECIFIED, UNSPECIFIED AS ACUTE OR CHRONIC, WITHOUT HEMORRHAGE OR PERFORATION: ICD-10-CM

## 2020-12-08 DIAGNOSIS — D64.9 ANEMIA, UNSPECIFIED: ICD-10-CM

## 2020-12-11 ENCOUNTER — APPOINTMENT (OUTPATIENT)
Age: 30
End: 2020-12-11

## 2021-02-05 ENCOUNTER — TRANSCRIPTION ENCOUNTER (OUTPATIENT)
Age: 31
End: 2021-02-05

## 2021-02-05 ENCOUNTER — APPOINTMENT (OUTPATIENT)
Age: 31
End: 2021-02-05

## 2021-02-05 ENCOUNTER — OUTPATIENT (OUTPATIENT)
Dept: OUTPATIENT SERVICES | Facility: HOSPITAL | Age: 31
LOS: 1 days | End: 2021-02-05
Payer: COMMERCIAL

## 2021-02-05 VITALS
DIASTOLIC BLOOD PRESSURE: 78 MMHG | SYSTOLIC BLOOD PRESSURE: 120 MMHG | RESPIRATION RATE: 18 BRPM | TEMPERATURE: 98 F | OXYGEN SATURATION: 99 % | HEART RATE: 70 BPM

## 2021-02-05 DIAGNOSIS — Z98.890 OTHER SPECIFIED POSTPROCEDURAL STATES: Chronic | ICD-10-CM

## 2021-02-05 DIAGNOSIS — K50.90 CROHN'S DISEASE, UNSPECIFIED, WITHOUT COMPLICATIONS: ICD-10-CM

## 2021-02-05 LAB
ALBUMIN SERPL ELPH-MCNC: 4.3 G/DL — SIGNIFICANT CHANGE UP (ref 3.3–5)
ALP SERPL-CCNC: 61 U/L — SIGNIFICANT CHANGE UP (ref 40–120)
ALT FLD-CCNC: 15 U/L — SIGNIFICANT CHANGE UP (ref 10–45)
AST SERPL-CCNC: 18 U/L — SIGNIFICANT CHANGE UP (ref 10–40)
BILIRUB DIRECT SERPL-MCNC: <0.2 MG/DL — SIGNIFICANT CHANGE UP (ref 0–0.2)
BILIRUB INDIRECT FLD-MCNC: SIGNIFICANT CHANGE UP MG/DL (ref 0.2–1)
BILIRUB SERPL-MCNC: 0.6 MG/DL — SIGNIFICANT CHANGE UP (ref 0.2–1.2)
CRP SERPL-MCNC: 0.11 MG/DL — SIGNIFICANT CHANGE UP (ref 0–0.4)
HCT VFR BLD CALC: 45.6 % — SIGNIFICANT CHANGE UP (ref 39–50)
HGB BLD-MCNC: 15.1 G/DL — SIGNIFICANT CHANGE UP (ref 13–17)
MCHC RBC-ENTMCNC: 30.4 PG — SIGNIFICANT CHANGE UP (ref 27–34)
MCHC RBC-ENTMCNC: 33.1 GM/DL — SIGNIFICANT CHANGE UP (ref 32–36)
MCV RBC AUTO: 91.9 FL — SIGNIFICANT CHANGE UP (ref 80–100)
NRBC # BLD: 0 /100 WBCS — SIGNIFICANT CHANGE UP (ref 0–0)
PLATELET # BLD AUTO: 244 K/UL — SIGNIFICANT CHANGE UP (ref 150–400)
PROT SERPL-MCNC: 7.5 G/DL — SIGNIFICANT CHANGE UP (ref 6–8.3)
RBC # BLD: 4.96 M/UL — SIGNIFICANT CHANGE UP (ref 4.2–5.8)
RBC # FLD: 12.7 % — SIGNIFICANT CHANGE UP (ref 10.3–14.5)
SARS-COV-2 RNA SPEC QL NAA+PROBE: NEGATIVE — SIGNIFICANT CHANGE UP
WBC # BLD: 6.39 K/UL — SIGNIFICANT CHANGE UP (ref 3.8–10.5)
WBC # FLD AUTO: 6.39 K/UL — SIGNIFICANT CHANGE UP (ref 3.8–10.5)

## 2021-02-05 PROCEDURE — 36415 COLL VENOUS BLD VENIPUNCTURE: CPT

## 2021-02-05 PROCEDURE — 82397 CHEMILUMINESCENT ASSAY: CPT

## 2021-02-05 PROCEDURE — 45380 COLONOSCOPY AND BIOPSY: CPT

## 2021-02-05 PROCEDURE — 86140 C-REACTIVE PROTEIN: CPT

## 2021-02-05 PROCEDURE — 80076 HEPATIC FUNCTION PANEL: CPT

## 2021-02-05 PROCEDURE — 96415 CHEMO IV INFUSION ADDL HR: CPT

## 2021-02-05 PROCEDURE — 80230 DRUG ASSAY INFLIXIMAB: CPT

## 2021-02-05 PROCEDURE — 96413 CHEMO IV INFUSION 1 HR: CPT

## 2021-02-05 PROCEDURE — U0003: CPT

## 2021-02-05 PROCEDURE — 88305 TISSUE EXAM BY PATHOLOGIST: CPT

## 2021-02-05 PROCEDURE — U0005: CPT

## 2021-02-05 PROCEDURE — 85027 COMPLETE CBC AUTOMATED: CPT

## 2021-02-05 RX ORDER — INFLIXIMAB-DYYB 120 MG/ML
800 INJECTION SUBCUTANEOUS ONCE
Refills: 0 | Status: COMPLETED | OUTPATIENT
Start: 2021-02-05 | End: 2021-02-05

## 2021-02-05 RX ADMIN — INFLIXIMAB-DYYB 155 MILLIGRAM(S): 120 INJECTION SUBCUTANEOUS at 12:40

## 2021-02-05 RX ADMIN — INFLIXIMAB-DYYB 800 MILLIGRAM(S): 120 INJECTION SUBCUTANEOUS at 14:40

## 2021-02-17 LAB
INFLIXIMAB AB SERPL-MCNC: <22 NG/ML — SIGNIFICANT CHANGE UP
INFLIXIMAB SERPL-MCNC: <0.4 UG/ML — SIGNIFICANT CHANGE UP

## 2021-02-18 ENCOUNTER — TRANSCRIPTION ENCOUNTER (OUTPATIENT)
Age: 31
End: 2021-02-18

## 2021-03-03 ENCOUNTER — APPOINTMENT (OUTPATIENT)
Dept: GASTROENTEROLOGY | Facility: CLINIC | Age: 31
End: 2021-03-03
Payer: COMMERCIAL

## 2021-03-03 DIAGNOSIS — Z00.00 ENCOUNTER FOR GENERAL ADULT MEDICAL EXAMINATION W/OUT ABNORMAL FINDINGS: ICD-10-CM

## 2021-03-03 PROCEDURE — 99214 OFFICE O/P EST MOD 30 MIN: CPT | Mod: 95

## 2021-03-03 NOTE — PHYSICAL EXAM
[General Appearance - Alert] : alert [General Appearance - In No Acute Distress] : in no acute distress [] : no respiratory distress [Oriented To Time, Place, And Person] : oriented to person, place, and time [Impaired Insight] : insight and judgment were intact [Affect] : the affect was normal [Mood] : the mood was normal

## 2021-03-06 NOTE — ASSESSMENT
[FreeTextEntry1] : 31 Y M h/o small bowel CD diagnosed in 2018 (on EGD for GI bleed eval w/ + VCE findings), however h/o multiple SBRs (initially for ileo intussusception 1990, then for GI bleed from anastomotic ulcer 2013, followed by laparoscopic ileocolic resection for GI bleed 2/2 anastomotic ulcer in 7/2018), on monotherapy with IFX 10 mg/kg q 8 weeks, with improved symptoms. \par \par # CD in clinical, endoscopic and histologic remission\par - Considering low drug concentration and severe disease in past will proactively change IFX at 10mg/kg to q 4 weeks with next dose \par - continue to monitor drug levels (previous drug levels <0.04 and Ab <0.22)\par - Hb is stable and no more bleeding episodes. \par - plan for referral for hyperbaric chamber treatment if anastomotic ulcer recurs \par - will repeat quantiferon, hep b and hep c work-up per infusion center\par \par # anemia - hgb stable \par - cont to f/u with Dr. Brar ; he's also r/o bleeding diathesis. 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 Brar \par - immune to Hep B, consider checking Hep A immunity with next infusion\par - TB negative 2018 \par - UTD derm FSBE\par \par F/u after 2 infusion.

## 2021-03-06 NOTE — HISTORY OF PRESENT ILLNESS
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 His most recent cscopes in dec 2020 revealed crohns disease in remission and nearly resolved anastomotic ulcer. \par \par Patient currently has no GI complains and is doing really well. 1-2 semi-well formed stools. No blood. No abdominal/rectal pain. No nausea vomiting. \par \par Patients last IFX drug levels <0.4 and ab <0.22. \par \par June 2019, patient's IFX dose was increased to 10mg/kg from 5mg/kg. After 2 sessions of the increased dose, he underwent a colonoscopy 9/5/19 to see whether there was any improvement in the ulcerations with higher dose of infliximab. Colonoscopy showed i1 disease, and anastatomic ulcer was still present friable with spontaneous oozing, seemed to have extended since prior colonoscopy.  At that time discussed the possibility of a hyperbaric chamber with high dose oxygen as treatment for the ulcer, which has been tried for other indications. He was unable to find a place that accepted his insurance. \par \par Colonoscopy 9/5/19: side to side anastamosis, Leonard-TI with 2 apthous ulcers, Ruutgers score i1, anastamotic ulcer friable with spontaneous oozing. Seemed to have extended since prior colon despite increased IFX. Ulcer not likely inflammatory in nature. \par Pathology: unremarkable\par \par EIMs: No\par \par CBC, CMP unremarkable from previous infusion \par -------------------------------------------------------------------------------------------------------------------\par PRIOR Hx - \par 28 Y M, hx of prior small bowel resection - first for ileo-cecal intussusception as an infant in 1990, and then distal SBR in 2013 for GI bleed; presents today s/p hospitalization and emergent weekend endoscopy for GI bleed and new diagnosis of Crohn's Disease. bx apparently from ileocolonic anastomosis, but VCE showed numerous diffuse sb aphthae.\par \par The patient had sx from his teen years, but moved around and didn't seek care during periods of wellness and also didn't have insurance coverage for a portion of the time.  Before moving to [place], he had several bleeding episodes, one which req. laparoscopic evaluation and resection of small bowel (in [place]) with unclear pathology.  NO one has told him he had any evidence of Crohn's in past. \par \par Pt was hospitalized in February and in April for melanic stools and anemia. He was transfused both admissions. He also had EGD/Colonoscopy and VCE (no reports available) that revealed multiple ulcers at prior ileo-cecal anastomosis w/biopsies that showed active chronic enteritis. \par EGD unrevealing.\par \par Pt was admitted to [facility] from 7/20-7/27/18 for recurrent LGIB 2/2 ileocolic anastomotic ulcer. He underwent a laparoscopic ileocolic resection with side-side anastomosis on 7/23/17. \par Since then, he has received two more loading doses of IFX. \par \par \par Prior biopsies of small bowel reveal active chronic enteritis with cryptitis and crypt architecture distortion. VCE revealed multiple ulcers in the jejunum and terminal ileum. \par Review of op report from [place] 2013 for GIB from anastamotic ulcer:\par 1. ileocectomy with Resection of previous ileal-ileal anastamosis\par 2. Mid ileum resection\par 3. Resection of previous jejunum to jeunum anastamosis; ?which had been bypassed; resected in setting of GIB. \par  \par Fam hx: no IBD or CRC\par Social hx: tobacco user 5x/week, alcohol use 5 drinks/week, marijuana use on occasion not weekly, NSAID use once/two weeks for HAs

## 2021-03-15 ENCOUNTER — APPOINTMENT (OUTPATIENT)
Age: 31
End: 2021-03-15

## 2021-03-15 ENCOUNTER — OUTPATIENT (OUTPATIENT)
Dept: OUTPATIENT SERVICES | Facility: HOSPITAL | Age: 31
LOS: 1 days | End: 2021-03-15
Payer: COMMERCIAL

## 2021-03-15 VITALS
TEMPERATURE: 98 F | SYSTOLIC BLOOD PRESSURE: 113 MMHG | OXYGEN SATURATION: 98 % | RESPIRATION RATE: 18 BRPM | DIASTOLIC BLOOD PRESSURE: 69 MMHG | HEART RATE: 62 BPM

## 2021-03-15 DIAGNOSIS — Z98.890 OTHER SPECIFIED POSTPROCEDURAL STATES: Chronic | ICD-10-CM

## 2021-03-15 DIAGNOSIS — K50.90 CROHN'S DISEASE, UNSPECIFIED, WITHOUT COMPLICATIONS: ICD-10-CM

## 2021-03-15 LAB
ALBUMIN SERPL ELPH-MCNC: 3.4 G/DL — SIGNIFICANT CHANGE UP (ref 3.3–5)
ALP SERPL-CCNC: 46 U/L — SIGNIFICANT CHANGE UP (ref 40–120)
ALT FLD-CCNC: 21 U/L — SIGNIFICANT CHANGE UP (ref 10–45)
ANION GAP SERPL CALC-SCNC: 3 MMOL/L — LOW (ref 5–17)
AST SERPL-CCNC: 30 U/L — SIGNIFICANT CHANGE UP (ref 10–40)
BILIRUB SERPL-MCNC: 0.7 MG/DL — SIGNIFICANT CHANGE UP (ref 0.2–1.2)
BUN SERPL-MCNC: 13 MG/DL — SIGNIFICANT CHANGE UP (ref 7–23)
CALCIUM SERPL-MCNC: 9.4 MG/DL — SIGNIFICANT CHANGE UP (ref 8.4–10.5)
CHLORIDE SERPL-SCNC: 107 MMOL/L — SIGNIFICANT CHANGE UP (ref 96–108)
CO2 SERPL-SCNC: 30 MMOL/L — SIGNIFICANT CHANGE UP (ref 22–31)
CREAT SERPL-MCNC: 0.9 MG/DL — SIGNIFICANT CHANGE UP (ref 0.5–1.3)
CRP SERPL-MCNC: 0.4 MG/L — SIGNIFICANT CHANGE UP (ref 0–4)
GLUCOSE SERPL-MCNC: 101 MG/DL — HIGH (ref 70–99)
HCT VFR BLD CALC: 45 % — SIGNIFICANT CHANGE UP (ref 39–50)
HGB BLD-MCNC: 15.1 G/DL — SIGNIFICANT CHANGE UP (ref 13–17)
MCHC RBC-ENTMCNC: 30.5 PG — SIGNIFICANT CHANGE UP (ref 27–34)
MCHC RBC-ENTMCNC: 33.6 GM/DL — SIGNIFICANT CHANGE UP (ref 32–36)
MCV RBC AUTO: 90.9 FL — SIGNIFICANT CHANGE UP (ref 80–100)
PLATELET # BLD AUTO: 211 K/UL — SIGNIFICANT CHANGE UP (ref 150–400)
POTASSIUM SERPL-MCNC: 4.7 MMOL/L — SIGNIFICANT CHANGE UP (ref 3.5–5.3)
POTASSIUM SERPL-SCNC: 4.7 MMOL/L — SIGNIFICANT CHANGE UP (ref 3.5–5.3)
PROT SERPL-MCNC: 6.6 G/DL — SIGNIFICANT CHANGE UP (ref 6–8.3)
RBC # BLD: 4.95 M/UL — SIGNIFICANT CHANGE UP (ref 4.2–5.8)
RBC # FLD: 13.3 % — SIGNIFICANT CHANGE UP (ref 10.3–14.5)
SARS-COV-2 RNA SPEC QL NAA+PROBE: NEGATIVE — SIGNIFICANT CHANGE UP
SODIUM SERPL-SCNC: 140 MMOL/L — SIGNIFICANT CHANGE UP (ref 135–145)
WBC # BLD: 5.5 K/UL — SIGNIFICANT CHANGE UP (ref 3.8–10.5)
WBC # FLD AUTO: 5.5 K/UL — SIGNIFICANT CHANGE UP (ref 3.8–10.5)

## 2021-03-15 PROCEDURE — 80053 COMPREHEN METABOLIC PANEL: CPT

## 2021-03-15 PROCEDURE — 96415 CHEMO IV INFUSION ADDL HR: CPT

## 2021-03-15 PROCEDURE — 80230 DRUG ASSAY INFLIXIMAB: CPT

## 2021-03-15 PROCEDURE — 86140 C-REACTIVE PROTEIN: CPT

## 2021-03-15 PROCEDURE — U0003: CPT

## 2021-03-15 PROCEDURE — 36415 COLL VENOUS BLD VENIPUNCTURE: CPT

## 2021-03-15 PROCEDURE — 85027 COMPLETE CBC AUTOMATED: CPT

## 2021-03-15 PROCEDURE — U0005: CPT

## 2021-03-15 PROCEDURE — 96413 CHEMO IV INFUSION 1 HR: CPT

## 2021-03-15 RX ORDER — INFLIXIMAB-DYYB 120 MG/ML
800 INJECTION SUBCUTANEOUS ONCE
Refills: 0 | Status: COMPLETED | OUTPATIENT
Start: 2021-03-15 | End: 2021-03-15

## 2021-03-15 RX ADMIN — INFLIXIMAB-DYYB 800 MILLIGRAM(S): 120 INJECTION SUBCUTANEOUS at 12:20

## 2021-03-15 RX ADMIN — INFLIXIMAB-DYYB 155 MILLIGRAM(S): 120 INJECTION SUBCUTANEOUS at 10:20

## 2021-03-19 LAB
ANTIBODIES TO INFLIXIMAB (ATI) CONCENTRATION: < 3.1 U/ML — SIGNIFICANT CHANGE UP
PROMETHEUS ANSER IFX RESULT: SIGNIFICANT CHANGE UP
PROMETHEUS LABORATORY FOOTER: SIGNIFICANT CHANGE UP
SERUM INFLIXIMAB (IFX) CONCENTRATION: 14.6 UG/ML — SIGNIFICANT CHANGE UP

## 2021-04-12 ENCOUNTER — APPOINTMENT (OUTPATIENT)
Age: 31
End: 2021-04-12

## 2021-04-12 ENCOUNTER — OUTPATIENT (OUTPATIENT)
Dept: OUTPATIENT SERVICES | Facility: HOSPITAL | Age: 31
LOS: 1 days | End: 2021-04-12
Payer: COMMERCIAL

## 2021-04-12 VITALS
DIASTOLIC BLOOD PRESSURE: 73 MMHG | HEART RATE: 64 BPM | SYSTOLIC BLOOD PRESSURE: 130 MMHG | RESPIRATION RATE: 18 BRPM | TEMPERATURE: 98 F | OXYGEN SATURATION: 99 %

## 2021-04-12 DIAGNOSIS — K50.90 CROHN'S DISEASE, UNSPECIFIED, WITHOUT COMPLICATIONS: ICD-10-CM

## 2021-04-12 DIAGNOSIS — Z98.890 OTHER SPECIFIED POSTPROCEDURAL STATES: Chronic | ICD-10-CM

## 2021-04-12 LAB
ALBUMIN SERPL ELPH-MCNC: 3.4 G/DL — SIGNIFICANT CHANGE UP (ref 3.3–5)
ALP SERPL-CCNC: 43 U/L — SIGNIFICANT CHANGE UP (ref 40–120)
ALT FLD-CCNC: 20 U/L — SIGNIFICANT CHANGE UP (ref 10–45)
ANION GAP SERPL CALC-SCNC: 3 MMOL/L — LOW (ref 5–17)
AST SERPL-CCNC: 34 U/L — SIGNIFICANT CHANGE UP (ref 10–40)
BILIRUB SERPL-MCNC: 0.4 MG/DL — SIGNIFICANT CHANGE UP (ref 0.2–1.2)
BUN SERPL-MCNC: 12 MG/DL — SIGNIFICANT CHANGE UP (ref 7–23)
CALCIUM SERPL-MCNC: 9.8 MG/DL — SIGNIFICANT CHANGE UP (ref 8.4–10.5)
CHLORIDE SERPL-SCNC: 108 MMOL/L — SIGNIFICANT CHANGE UP (ref 96–108)
CO2 SERPL-SCNC: 28 MMOL/L — SIGNIFICANT CHANGE UP (ref 22–31)
CREAT SERPL-MCNC: 1 MG/DL — SIGNIFICANT CHANGE UP (ref 0.5–1.3)
CRP SERPL-MCNC: <0.3 MG/L — SIGNIFICANT CHANGE UP (ref 0–4)
GLUCOSE SERPL-MCNC: 104 MG/DL — HIGH (ref 70–99)
HCT VFR BLD CALC: 44.6 % — SIGNIFICANT CHANGE UP (ref 39–50)
HGB BLD-MCNC: 14.5 G/DL — SIGNIFICANT CHANGE UP (ref 13–17)
MCHC RBC-ENTMCNC: 30.3 PG — SIGNIFICANT CHANGE UP (ref 27–34)
MCHC RBC-ENTMCNC: 32.5 GM/DL — SIGNIFICANT CHANGE UP (ref 32–36)
MCV RBC AUTO: 93.1 FL — SIGNIFICANT CHANGE UP (ref 80–100)
PLATELET # BLD AUTO: 232 K/UL — SIGNIFICANT CHANGE UP (ref 150–400)
POTASSIUM SERPL-MCNC: 4.9 MMOL/L — SIGNIFICANT CHANGE UP (ref 3.5–5.3)
POTASSIUM SERPL-SCNC: 4.9 MMOL/L — SIGNIFICANT CHANGE UP (ref 3.5–5.3)
PROT SERPL-MCNC: 6.8 G/DL — SIGNIFICANT CHANGE UP (ref 6–8.3)
RBC # BLD: 4.79 M/UL — SIGNIFICANT CHANGE UP (ref 4.2–5.8)
RBC # FLD: 13 % — SIGNIFICANT CHANGE UP (ref 10.3–14.5)
SODIUM SERPL-SCNC: 139 MMOL/L — SIGNIFICANT CHANGE UP (ref 135–145)
WBC # BLD: 7.1 K/UL — SIGNIFICANT CHANGE UP (ref 3.8–10.5)
WBC # FLD AUTO: 7.1 K/UL — SIGNIFICANT CHANGE UP (ref 3.8–10.5)

## 2021-04-12 PROCEDURE — 86140 C-REACTIVE PROTEIN: CPT

## 2021-04-12 PROCEDURE — 96413 CHEMO IV INFUSION 1 HR: CPT

## 2021-04-12 PROCEDURE — 82397 CHEMILUMINESCENT ASSAY: CPT

## 2021-04-12 PROCEDURE — 85027 COMPLETE CBC AUTOMATED: CPT

## 2021-04-12 PROCEDURE — 80230 DRUG ASSAY INFLIXIMAB: CPT

## 2021-04-12 PROCEDURE — 80053 COMPREHEN METABOLIC PANEL: CPT

## 2021-04-12 PROCEDURE — 36415 COLL VENOUS BLD VENIPUNCTURE: CPT

## 2021-04-12 RX ORDER — INFLIXIMAB-DYYB 120 MG/ML
800 INJECTION SUBCUTANEOUS ONCE
Refills: 0 | Status: COMPLETED | OUTPATIENT
Start: 2021-04-12 | End: 2021-04-12

## 2021-04-12 RX ADMIN — INFLIXIMAB-DYYB 800 MILLIGRAM(S): 120 INJECTION SUBCUTANEOUS at 10:59

## 2021-04-12 RX ADMIN — INFLIXIMAB-DYYB 155 MILLIGRAM(S): 120 INJECTION SUBCUTANEOUS at 09:30

## 2021-05-10 ENCOUNTER — APPOINTMENT (OUTPATIENT)
Age: 31
End: 2021-05-10

## 2021-05-10 ENCOUNTER — OUTPATIENT (OUTPATIENT)
Dept: OUTPATIENT SERVICES | Facility: HOSPITAL | Age: 31
LOS: 1 days | End: 2021-05-10
Payer: COMMERCIAL

## 2021-05-10 VITALS
TEMPERATURE: 97 F | WEIGHT: 169.98 LBS | HEIGHT: 60.11 IN | RESPIRATION RATE: 18 BRPM | OXYGEN SATURATION: 99 % | DIASTOLIC BLOOD PRESSURE: 77 MMHG | HEART RATE: 60 BPM | SYSTOLIC BLOOD PRESSURE: 113 MMHG

## 2021-05-10 DIAGNOSIS — K50.90 CROHN'S DISEASE, UNSPECIFIED, WITHOUT COMPLICATIONS: ICD-10-CM

## 2021-05-10 DIAGNOSIS — Z98.890 OTHER SPECIFIED POSTPROCEDURAL STATES: Chronic | ICD-10-CM

## 2021-05-10 LAB
ALBUMIN SERPL ELPH-MCNC: 3.9 G/DL — SIGNIFICANT CHANGE UP (ref 3.3–5)
ALP SERPL-CCNC: 42 U/L — SIGNIFICANT CHANGE UP (ref 40–120)
ALT FLD-CCNC: 41 U/L — SIGNIFICANT CHANGE UP (ref 10–45)
ANION GAP SERPL CALC-SCNC: 5 MMOL/L — SIGNIFICANT CHANGE UP (ref 5–17)
AST SERPL-CCNC: 72 U/L — HIGH (ref 10–40)
BILIRUB DIRECT SERPL-MCNC: <0.2 MG/DL — SIGNIFICANT CHANGE UP (ref 0–0.2)
BILIRUB INDIRECT FLD-MCNC: SIGNIFICANT CHANGE UP MG/DL (ref 0.2–1)
BILIRUB SERPL-MCNC: 0.2 MG/DL — SIGNIFICANT CHANGE UP (ref 0.2–1.2)
BUN SERPL-MCNC: 16 MG/DL — SIGNIFICANT CHANGE UP (ref 7–23)
CALCIUM SERPL-MCNC: 8.9 MG/DL — SIGNIFICANT CHANGE UP (ref 8.4–10.5)
CHLORIDE SERPL-SCNC: 108 MMOL/L — SIGNIFICANT CHANGE UP (ref 96–108)
CO2 SERPL-SCNC: 26 MMOL/L — SIGNIFICANT CHANGE UP (ref 22–31)
CREAT SERPL-MCNC: 1.15 MG/DL — SIGNIFICANT CHANGE UP (ref 0.5–1.3)
CRP SERPL-MCNC: 0.3 MG/L — SIGNIFICANT CHANGE UP (ref 0–4)
GLUCOSE SERPL-MCNC: 101 MG/DL — HIGH (ref 70–99)
HCT VFR BLD CALC: 39.9 % — SIGNIFICANT CHANGE UP (ref 39–50)
HGB BLD-MCNC: 13.1 G/DL — SIGNIFICANT CHANGE UP (ref 13–17)
MCHC RBC-ENTMCNC: 29.8 PG — SIGNIFICANT CHANGE UP (ref 27–34)
MCHC RBC-ENTMCNC: 32.8 GM/DL — SIGNIFICANT CHANGE UP (ref 32–36)
MCV RBC AUTO: 90.7 FL — SIGNIFICANT CHANGE UP (ref 80–100)
NRBC # BLD: 0 /100 WBCS — SIGNIFICANT CHANGE UP (ref 0–0)
PLATELET # BLD AUTO: 223 K/UL — SIGNIFICANT CHANGE UP (ref 150–400)
POTASSIUM SERPL-MCNC: 4.5 MMOL/L — SIGNIFICANT CHANGE UP (ref 3.5–5.3)
POTASSIUM SERPL-SCNC: 4.5 MMOL/L — SIGNIFICANT CHANGE UP (ref 3.5–5.3)
PROT SERPL-MCNC: 6.4 G/DL — SIGNIFICANT CHANGE UP (ref 6–8.3)
RBC # BLD: 4.4 M/UL — SIGNIFICANT CHANGE UP (ref 4.2–5.8)
RBC # FLD: 12.8 % — SIGNIFICANT CHANGE UP (ref 10.3–14.5)
SODIUM SERPL-SCNC: 139 MMOL/L — SIGNIFICANT CHANGE UP (ref 135–145)
WBC # BLD: 5.84 K/UL — SIGNIFICANT CHANGE UP (ref 3.8–10.5)
WBC # FLD AUTO: 5.84 K/UL — SIGNIFICANT CHANGE UP (ref 3.8–10.5)

## 2021-05-10 PROCEDURE — 86140 C-REACTIVE PROTEIN: CPT

## 2021-05-10 PROCEDURE — 36415 COLL VENOUS BLD VENIPUNCTURE: CPT

## 2021-05-10 PROCEDURE — 80230 DRUG ASSAY INFLIXIMAB: CPT

## 2021-05-10 PROCEDURE — 80076 HEPATIC FUNCTION PANEL: CPT

## 2021-05-10 PROCEDURE — 96413 CHEMO IV INFUSION 1 HR: CPT

## 2021-05-10 PROCEDURE — 80048 BASIC METABOLIC PNL TOTAL CA: CPT

## 2021-05-10 PROCEDURE — 85027 COMPLETE CBC AUTOMATED: CPT

## 2021-05-10 RX ORDER — INFLIXIMAB-DYYB 120 MG/ML
800 INJECTION SUBCUTANEOUS ONCE
Refills: 0 | Status: COMPLETED | OUTPATIENT
Start: 2021-05-10 | End: 2021-05-10

## 2021-05-10 RX ADMIN — INFLIXIMAB-DYYB 155 MILLIGRAM(S): 120 INJECTION SUBCUTANEOUS at 09:53

## 2021-05-10 RX ADMIN — INFLIXIMAB-DYYB 800 MILLIGRAM(S): 120 INJECTION SUBCUTANEOUS at 11:05

## 2021-05-12 LAB
INFLIXIMAB AB SERPL-MCNC: < 22 — SIGNIFICANT CHANGE UP
INFLIXIMAB SERPL-MCNC: 29 — SIGNIFICANT CHANGE UP

## 2021-05-13 LAB
ANTIBODIES TO INFLIXIMAB (ATI) CONCENTRATION: < 3.1 U/ML — SIGNIFICANT CHANGE UP
PROMETHEUS ANSER IFX RESULT: SIGNIFICANT CHANGE UP
PROMETHEUS LABORATORY FOOTER: SIGNIFICANT CHANGE UP
SERUM INFLIXIMAB (IFX) CONCENTRATION: 33.4 UG/ML — SIGNIFICANT CHANGE UP

## 2021-06-17 ENCOUNTER — OUTPATIENT (OUTPATIENT)
Dept: OUTPATIENT SERVICES | Facility: HOSPITAL | Age: 31
LOS: 1 days | End: 2021-06-17
Payer: COMMERCIAL

## 2021-06-17 ENCOUNTER — APPOINTMENT (OUTPATIENT)
Age: 31
End: 2021-06-17

## 2021-06-17 VITALS
OXYGEN SATURATION: 98 % | TEMPERATURE: 98 F | HEART RATE: 70 BPM | SYSTOLIC BLOOD PRESSURE: 119 MMHG | RESPIRATION RATE: 18 BRPM | DIASTOLIC BLOOD PRESSURE: 74 MMHG

## 2021-06-17 VITALS
SYSTOLIC BLOOD PRESSURE: 117 MMHG | TEMPERATURE: 98 F | OXYGEN SATURATION: 99 % | HEART RATE: 68 BPM | RESPIRATION RATE: 18 BRPM | DIASTOLIC BLOOD PRESSURE: 73 MMHG

## 2021-06-17 DIAGNOSIS — Z98.890 OTHER SPECIFIED POSTPROCEDURAL STATES: Chronic | ICD-10-CM

## 2021-06-17 DIAGNOSIS — K50.90 CROHN'S DISEASE, UNSPECIFIED, WITHOUT COMPLICATIONS: ICD-10-CM

## 2021-06-17 LAB
ALBUMIN SERPL ELPH-MCNC: 4 G/DL — SIGNIFICANT CHANGE UP (ref 3.3–5)
ALP SERPL-CCNC: 54 U/L — SIGNIFICANT CHANGE UP (ref 40–120)
ALT FLD-CCNC: 15 U/L — SIGNIFICANT CHANGE UP (ref 10–45)
ANION GAP SERPL CALC-SCNC: 10 MMOL/L — SIGNIFICANT CHANGE UP (ref 5–17)
AST SERPL-CCNC: 19 U/L — SIGNIFICANT CHANGE UP (ref 10–40)
BILIRUB SERPL-MCNC: 0.3 MG/DL — SIGNIFICANT CHANGE UP (ref 0.2–1.2)
BUN SERPL-MCNC: 13 MG/DL — SIGNIFICANT CHANGE UP (ref 7–23)
CALCIUM SERPL-MCNC: 9 MG/DL — SIGNIFICANT CHANGE UP (ref 8.4–10.5)
CHLORIDE SERPL-SCNC: 106 MMOL/L — SIGNIFICANT CHANGE UP (ref 96–108)
CO2 SERPL-SCNC: 22 MMOL/L — SIGNIFICANT CHANGE UP (ref 22–31)
CREAT SERPL-MCNC: 1.01 MG/DL — SIGNIFICANT CHANGE UP (ref 0.5–1.3)
CRP SERPL-MCNC: 0.8 MG/L — SIGNIFICANT CHANGE UP (ref 0–4)
GLUCOSE SERPL-MCNC: 98 MG/DL — SIGNIFICANT CHANGE UP (ref 70–99)
HCT VFR BLD CALC: 40.5 % — SIGNIFICANT CHANGE UP (ref 39–50)
HGB BLD-MCNC: 12.7 G/DL — LOW (ref 13–17)
MCHC RBC-ENTMCNC: 27.1 PG — SIGNIFICANT CHANGE UP (ref 27–34)
MCHC RBC-ENTMCNC: 31.4 GM/DL — LOW (ref 32–36)
MCV RBC AUTO: 86.4 FL — SIGNIFICANT CHANGE UP (ref 80–100)
PLATELET # BLD AUTO: 237 K/UL — SIGNIFICANT CHANGE UP (ref 150–400)
POTASSIUM SERPL-MCNC: 4.4 MMOL/L — SIGNIFICANT CHANGE UP (ref 3.5–5.3)
POTASSIUM SERPL-SCNC: 4.4 MMOL/L — SIGNIFICANT CHANGE UP (ref 3.5–5.3)
PROT SERPL-MCNC: 7 G/DL — SIGNIFICANT CHANGE UP (ref 6–8.3)
RBC # BLD: 4.69 M/UL — SIGNIFICANT CHANGE UP (ref 4.2–5.8)
RBC # FLD: 13.9 % — SIGNIFICANT CHANGE UP (ref 10.3–14.5)
SODIUM SERPL-SCNC: 138 MMOL/L — SIGNIFICANT CHANGE UP (ref 135–145)
WBC # BLD: 6.6 K/UL — SIGNIFICANT CHANGE UP (ref 3.8–10.5)
WBC # FLD AUTO: 6.6 K/UL — SIGNIFICANT CHANGE UP (ref 3.8–10.5)

## 2021-06-17 PROCEDURE — 80230 DRUG ASSAY INFLIXIMAB: CPT

## 2021-06-17 PROCEDURE — 80053 COMPREHEN METABOLIC PANEL: CPT

## 2021-06-17 PROCEDURE — 96413 CHEMO IV INFUSION 1 HR: CPT

## 2021-06-17 PROCEDURE — 96415 CHEMO IV INFUSION ADDL HR: CPT

## 2021-06-17 PROCEDURE — 36415 COLL VENOUS BLD VENIPUNCTURE: CPT

## 2021-06-17 PROCEDURE — 86140 C-REACTIVE PROTEIN: CPT

## 2021-06-17 PROCEDURE — 85027 COMPLETE CBC AUTOMATED: CPT

## 2021-06-17 RX ORDER — INFLIXIMAB-DYYB 120 MG/ML
800 INJECTION SUBCUTANEOUS ONCE
Refills: 0 | Status: COMPLETED | OUTPATIENT
Start: 2021-06-17 | End: 2021-06-17

## 2021-06-17 RX ADMIN — INFLIXIMAB-DYYB 155 MILLIGRAM(S): 120 INJECTION SUBCUTANEOUS at 09:48

## 2021-06-17 RX ADMIN — INFLIXIMAB-DYYB 800 MILLIGRAM(S): 120 INJECTION SUBCUTANEOUS at 11:38

## 2021-06-23 LAB
ANTIBODIES TO INFLIXIMAB (ATI) CONCENTRATION: < 3.1 U/ML — SIGNIFICANT CHANGE UP
PROMETHEUS ANSER IFX RESULT: SIGNIFICANT CHANGE UP
PROMETHEUS LABORATORY FOOTER: SIGNIFICANT CHANGE UP
SERUM INFLIXIMAB (IFX) CONCENTRATION: 22.4 UG/ML — SIGNIFICANT CHANGE UP

## 2021-07-12 ENCOUNTER — APPOINTMENT (OUTPATIENT)
Age: 31
End: 2021-07-12

## 2021-07-15 ENCOUNTER — APPOINTMENT (OUTPATIENT)
Age: 31
End: 2021-07-15

## 2021-07-15 ENCOUNTER — OUTPATIENT (OUTPATIENT)
Dept: OUTPATIENT SERVICES | Facility: HOSPITAL | Age: 31
LOS: 1 days | End: 2021-07-15
Payer: COMMERCIAL

## 2021-07-15 VITALS
HEART RATE: 71 BPM | SYSTOLIC BLOOD PRESSURE: 104 MMHG | WEIGHT: 164.91 LBS | TEMPERATURE: 97 F | RESPIRATION RATE: 17 BRPM | HEIGHT: 71 IN | DIASTOLIC BLOOD PRESSURE: 70 MMHG | OXYGEN SATURATION: 98 %

## 2021-07-15 VITALS
TEMPERATURE: 98 F | DIASTOLIC BLOOD PRESSURE: 68 MMHG | SYSTOLIC BLOOD PRESSURE: 110 MMHG | RESPIRATION RATE: 15 BRPM | OXYGEN SATURATION: 99 % | HEART RATE: 68 BPM

## 2021-07-15 DIAGNOSIS — Z98.890 OTHER SPECIFIED POSTPROCEDURAL STATES: Chronic | ICD-10-CM

## 2021-07-15 DIAGNOSIS — K50.90 CROHN'S DISEASE, UNSPECIFIED, WITHOUT COMPLICATIONS: ICD-10-CM

## 2021-07-15 LAB
ALBUMIN SERPL ELPH-MCNC: 4.1 G/DL — SIGNIFICANT CHANGE UP (ref 3.3–5)
ALP SERPL-CCNC: 58 U/L — SIGNIFICANT CHANGE UP (ref 40–120)
ALT FLD-CCNC: 17 U/L — SIGNIFICANT CHANGE UP (ref 10–45)
ANION GAP SERPL CALC-SCNC: 9 MMOL/L — SIGNIFICANT CHANGE UP (ref 5–17)
AST SERPL-CCNC: 24 U/L — SIGNIFICANT CHANGE UP (ref 10–40)
BILIRUB DIRECT SERPL-MCNC: <0.2 MG/DL — SIGNIFICANT CHANGE UP (ref 0–0.2)
BILIRUB INDIRECT FLD-MCNC: SIGNIFICANT CHANGE UP MG/DL (ref 0.2–1)
BILIRUB SERPL-MCNC: 0.2 MG/DL — SIGNIFICANT CHANGE UP (ref 0.2–1.2)
BUN SERPL-MCNC: 15 MG/DL — SIGNIFICANT CHANGE UP (ref 7–23)
CALCIUM SERPL-MCNC: 9.1 MG/DL — SIGNIFICANT CHANGE UP (ref 8.4–10.5)
CHLORIDE SERPL-SCNC: 106 MMOL/L — SIGNIFICANT CHANGE UP (ref 96–108)
CO2 SERPL-SCNC: 25 MMOL/L — SIGNIFICANT CHANGE UP (ref 22–31)
CREAT SERPL-MCNC: 1.16 MG/DL — SIGNIFICANT CHANGE UP (ref 0.5–1.3)
CRP SERPL-MCNC: 0.9 MG/L — SIGNIFICANT CHANGE UP (ref 0–4)
GLUCOSE SERPL-MCNC: 90 MG/DL — SIGNIFICANT CHANGE UP (ref 70–99)
HCT VFR BLD CALC: 40.9 % — SIGNIFICANT CHANGE UP (ref 39–50)
HGB BLD-MCNC: 12.9 G/DL — LOW (ref 13–17)
MCHC RBC-ENTMCNC: 26.8 PG — LOW (ref 27–34)
MCHC RBC-ENTMCNC: 31.5 GM/DL — LOW (ref 32–36)
MCV RBC AUTO: 85 FL — SIGNIFICANT CHANGE UP (ref 80–100)
NRBC # BLD: 0 /100 WBCS — SIGNIFICANT CHANGE UP (ref 0–0)
PLATELET # BLD AUTO: 244 K/UL — SIGNIFICANT CHANGE UP (ref 150–400)
POTASSIUM SERPL-MCNC: 4 MMOL/L — SIGNIFICANT CHANGE UP (ref 3.5–5.3)
POTASSIUM SERPL-SCNC: 4 MMOL/L — SIGNIFICANT CHANGE UP (ref 3.5–5.3)
PROT SERPL-MCNC: 7.2 G/DL — SIGNIFICANT CHANGE UP (ref 6–8.3)
RBC # BLD: 4.81 M/UL — SIGNIFICANT CHANGE UP (ref 4.2–5.8)
RBC # FLD: 14.4 % — SIGNIFICANT CHANGE UP (ref 10.3–14.5)
SODIUM SERPL-SCNC: 140 MMOL/L — SIGNIFICANT CHANGE UP (ref 135–145)
WBC # BLD: 6.7 K/UL — SIGNIFICANT CHANGE UP (ref 3.8–10.5)
WBC # FLD AUTO: 6.7 K/UL — SIGNIFICANT CHANGE UP (ref 3.8–10.5)

## 2021-07-15 PROCEDURE — 96365 THER/PROPH/DIAG IV INF INIT: CPT

## 2021-07-15 PROCEDURE — 96366 THER/PROPH/DIAG IV INF ADDON: CPT

## 2021-07-15 PROCEDURE — 36415 COLL VENOUS BLD VENIPUNCTURE: CPT

## 2021-07-15 PROCEDURE — 80048 BASIC METABOLIC PNL TOTAL CA: CPT

## 2021-07-15 PROCEDURE — 80076 HEPATIC FUNCTION PANEL: CPT

## 2021-07-15 PROCEDURE — 80230 DRUG ASSAY INFLIXIMAB: CPT

## 2021-07-15 PROCEDURE — 85027 COMPLETE CBC AUTOMATED: CPT

## 2021-07-15 PROCEDURE — 86140 C-REACTIVE PROTEIN: CPT

## 2021-07-15 RX ORDER — INFLIXIMAB-DYYB 120 MG/ML
800 INJECTION SUBCUTANEOUS ONCE
Refills: 0 | Status: COMPLETED | OUTPATIENT
Start: 2021-07-15 | End: 2021-07-15

## 2021-07-15 RX ADMIN — INFLIXIMAB-DYYB 800 MILLIGRAM(S): 120 INJECTION SUBCUTANEOUS at 12:05

## 2021-07-15 RX ADMIN — INFLIXIMAB-DYYB 155 MILLIGRAM(S): 120 INJECTION SUBCUTANEOUS at 09:36

## 2021-07-21 LAB
ANTIBODIES TO INFLIXIMAB (ATI) CONCENTRATION: < 3.1 U/ML — SIGNIFICANT CHANGE UP
PROMETHEUS ANSER IFX RESULT: SIGNIFICANT CHANGE UP
PROMETHEUS LABORATORY FOOTER: SIGNIFICANT CHANGE UP
SERUM INFLIXIMAB (IFX) CONCENTRATION: 28.7 UG/ML — SIGNIFICANT CHANGE UP

## 2021-08-12 ENCOUNTER — APPOINTMENT (OUTPATIENT)
Age: 31
End: 2021-08-12

## 2021-08-12 ENCOUNTER — OUTPATIENT (OUTPATIENT)
Dept: OUTPATIENT SERVICES | Facility: HOSPITAL | Age: 31
LOS: 1 days | End: 2021-08-12
Payer: COMMERCIAL

## 2021-08-12 VITALS
HEART RATE: 70 BPM | OXYGEN SATURATION: 98 % | TEMPERATURE: 98 F | RESPIRATION RATE: 17 BRPM | WEIGHT: 164.91 LBS | SYSTOLIC BLOOD PRESSURE: 114 MMHG | HEIGHT: 71 IN | DIASTOLIC BLOOD PRESSURE: 67 MMHG

## 2021-08-12 VITALS
RESPIRATION RATE: 15 BRPM | DIASTOLIC BLOOD PRESSURE: 66 MMHG | SYSTOLIC BLOOD PRESSURE: 110 MMHG | HEART RATE: 66 BPM | TEMPERATURE: 98 F | OXYGEN SATURATION: 99 %

## 2021-08-12 DIAGNOSIS — K50.90 CROHN'S DISEASE, UNSPECIFIED, WITHOUT COMPLICATIONS: ICD-10-CM

## 2021-08-12 DIAGNOSIS — Z98.890 OTHER SPECIFIED POSTPROCEDURAL STATES: Chronic | ICD-10-CM

## 2021-08-12 LAB
ALBUMIN SERPL ELPH-MCNC: 4 G/DL — SIGNIFICANT CHANGE UP (ref 3.3–5)
ALP SERPL-CCNC: 46 U/L — SIGNIFICANT CHANGE UP (ref 40–120)
ALT FLD-CCNC: 14 U/L — SIGNIFICANT CHANGE UP (ref 10–45)
ANION GAP SERPL CALC-SCNC: 6 MMOL/L — SIGNIFICANT CHANGE UP (ref 5–17)
AST SERPL-CCNC: 19 U/L — SIGNIFICANT CHANGE UP (ref 10–40)
BILIRUB SERPL-MCNC: 0.2 MG/DL — SIGNIFICANT CHANGE UP (ref 0.2–1.2)
BUN SERPL-MCNC: 12 MG/DL — SIGNIFICANT CHANGE UP (ref 7–23)
CALCIUM SERPL-MCNC: 9.3 MG/DL — SIGNIFICANT CHANGE UP (ref 8.4–10.5)
CHLORIDE SERPL-SCNC: 108 MMOL/L — SIGNIFICANT CHANGE UP (ref 96–108)
CO2 SERPL-SCNC: 24 MMOL/L — SIGNIFICANT CHANGE UP (ref 22–31)
CREAT SERPL-MCNC: 1.03 MG/DL — SIGNIFICANT CHANGE UP (ref 0.5–1.3)
CRP SERPL-MCNC: <3 MG/L — SIGNIFICANT CHANGE UP (ref 0–4)
GLUCOSE SERPL-MCNC: 92 MG/DL — SIGNIFICANT CHANGE UP (ref 70–99)
HCT VFR BLD CALC: 39.2 % — SIGNIFICANT CHANGE UP (ref 39–50)
HGB BLD-MCNC: 12.3 G/DL — LOW (ref 13–17)
MCHC RBC-ENTMCNC: 26.1 PG — LOW (ref 27–34)
MCHC RBC-ENTMCNC: 31.4 GM/DL — LOW (ref 32–36)
MCV RBC AUTO: 83.2 FL — SIGNIFICANT CHANGE UP (ref 80–100)
NRBC # BLD: 0 /100 WBCS — SIGNIFICANT CHANGE UP (ref 0–0)
PLATELET # BLD AUTO: 258 K/UL — SIGNIFICANT CHANGE UP (ref 150–400)
POTASSIUM SERPL-MCNC: 4.4 MMOL/L — SIGNIFICANT CHANGE UP (ref 3.5–5.3)
POTASSIUM SERPL-SCNC: 4.4 MMOL/L — SIGNIFICANT CHANGE UP (ref 3.5–5.3)
PROT SERPL-MCNC: 6.6 G/DL — SIGNIFICANT CHANGE UP (ref 6–8.3)
RBC # BLD: 4.71 M/UL — SIGNIFICANT CHANGE UP (ref 4.2–5.8)
RBC # FLD: 14.8 % — HIGH (ref 10.3–14.5)
SODIUM SERPL-SCNC: 138 MMOL/L — SIGNIFICANT CHANGE UP (ref 135–145)
WBC # BLD: 6.37 K/UL — SIGNIFICANT CHANGE UP (ref 3.8–10.5)
WBC # FLD AUTO: 6.37 K/UL — SIGNIFICANT CHANGE UP (ref 3.8–10.5)

## 2021-08-12 PROCEDURE — 85027 COMPLETE CBC AUTOMATED: CPT

## 2021-08-12 PROCEDURE — 80053 COMPREHEN METABOLIC PANEL: CPT

## 2021-08-12 PROCEDURE — 96413 CHEMO IV INFUSION 1 HR: CPT

## 2021-08-12 PROCEDURE — 86140 C-REACTIVE PROTEIN: CPT

## 2021-08-12 PROCEDURE — 36415 COLL VENOUS BLD VENIPUNCTURE: CPT

## 2021-08-12 PROCEDURE — 96415 CHEMO IV INFUSION ADDL HR: CPT

## 2021-08-12 PROCEDURE — 80230 DRUG ASSAY INFLIXIMAB: CPT

## 2021-08-12 RX ORDER — INFLIXIMAB-DYYB 120 MG/ML
800 INJECTION SUBCUTANEOUS ONCE
Refills: 0 | Status: COMPLETED | OUTPATIENT
Start: 2021-08-12 | End: 2021-08-12

## 2021-08-12 RX ADMIN — INFLIXIMAB-DYYB 155 MILLIGRAM(S): 120 INJECTION SUBCUTANEOUS at 09:37

## 2021-08-12 RX ADMIN — INFLIXIMAB-DYYB 800 MILLIGRAM(S): 120 INJECTION SUBCUTANEOUS at 11:05

## 2021-08-17 LAB
ANTIBODIES TO INFLIXIMAB (ATI) CONCENTRATION: < 3.1 U/ML — SIGNIFICANT CHANGE UP
PROMETHEUS ANSER IFX RESULT: SIGNIFICANT CHANGE UP
PROMETHEUS LABORATORY FOOTER: SIGNIFICANT CHANGE UP
SERUM INFLIXIMAB (IFX) CONCENTRATION: > 34 UG/ML — SIGNIFICANT CHANGE UP

## 2021-09-09 ENCOUNTER — OUTPATIENT (OUTPATIENT)
Dept: OUTPATIENT SERVICES | Facility: HOSPITAL | Age: 31
LOS: 1 days | End: 2021-09-09
Payer: COMMERCIAL

## 2021-09-09 VITALS
RESPIRATION RATE: 16 BRPM | HEART RATE: 94 BPM | OXYGEN SATURATION: 98 % | TEMPERATURE: 98 F | DIASTOLIC BLOOD PRESSURE: 94 MMHG | SYSTOLIC BLOOD PRESSURE: 117 MMHG

## 2021-09-09 VITALS
DIASTOLIC BLOOD PRESSURE: 77 MMHG | OXYGEN SATURATION: 98 % | RESPIRATION RATE: 16 BRPM | WEIGHT: 169.98 LBS | HEIGHT: 71 IN | SYSTOLIC BLOOD PRESSURE: 118 MMHG | HEART RATE: 70 BPM | TEMPERATURE: 98 F

## 2021-09-09 DIAGNOSIS — K50.90 CROHN'S DISEASE, UNSPECIFIED, WITHOUT COMPLICATIONS: ICD-10-CM

## 2021-09-09 DIAGNOSIS — Z98.890 OTHER SPECIFIED POSTPROCEDURAL STATES: Chronic | ICD-10-CM

## 2021-09-09 LAB
ALBUMIN SERPL ELPH-MCNC: 4.1 G/DL — SIGNIFICANT CHANGE UP (ref 3.3–5)
ALP SERPL-CCNC: 51 U/L — SIGNIFICANT CHANGE UP (ref 40–120)
ALT FLD-CCNC: 15 U/L — SIGNIFICANT CHANGE UP (ref 10–45)
ANION GAP SERPL CALC-SCNC: 7 MMOL/L — SIGNIFICANT CHANGE UP (ref 5–17)
AST SERPL-CCNC: 20 U/L — SIGNIFICANT CHANGE UP (ref 10–40)
BILIRUB SERPL-MCNC: 0.3 MG/DL — SIGNIFICANT CHANGE UP (ref 0.2–1.2)
BUN SERPL-MCNC: 12 MG/DL — SIGNIFICANT CHANGE UP (ref 7–23)
CALCIUM SERPL-MCNC: 9.3 MG/DL — SIGNIFICANT CHANGE UP (ref 8.4–10.5)
CHLORIDE SERPL-SCNC: 104 MMOL/L — SIGNIFICANT CHANGE UP (ref 96–108)
CO2 SERPL-SCNC: 25 MMOL/L — SIGNIFICANT CHANGE UP (ref 22–31)
CREAT SERPL-MCNC: 1.14 MG/DL — SIGNIFICANT CHANGE UP (ref 0.5–1.3)
CRP SERPL-MCNC: <3 MG/L — SIGNIFICANT CHANGE UP (ref 0–4)
GLUCOSE SERPL-MCNC: 89 MG/DL — SIGNIFICANT CHANGE UP (ref 70–99)
HCT VFR BLD CALC: 38.7 % — LOW (ref 39–50)
HGB BLD-MCNC: 12.2 G/DL — LOW (ref 13–17)
MCHC RBC-ENTMCNC: 25.6 PG — LOW (ref 27–34)
MCHC RBC-ENTMCNC: 31.5 GM/DL — LOW (ref 32–36)
MCV RBC AUTO: 81.1 FL — SIGNIFICANT CHANGE UP (ref 80–100)
NRBC # BLD: 0 /100 WBCS — SIGNIFICANT CHANGE UP (ref 0–0)
PLATELET # BLD AUTO: 263 K/UL — SIGNIFICANT CHANGE UP (ref 150–400)
POTASSIUM SERPL-MCNC: 3.9 MMOL/L — SIGNIFICANT CHANGE UP (ref 3.5–5.3)
POTASSIUM SERPL-SCNC: 3.9 MMOL/L — SIGNIFICANT CHANGE UP (ref 3.5–5.3)
PROT SERPL-MCNC: 6.8 G/DL — SIGNIFICANT CHANGE UP (ref 6–8.3)
RBC # BLD: 4.77 M/UL — SIGNIFICANT CHANGE UP (ref 4.2–5.8)
RBC # FLD: 15.2 % — HIGH (ref 10.3–14.5)
SODIUM SERPL-SCNC: 136 MMOL/L — SIGNIFICANT CHANGE UP (ref 135–145)
WBC # BLD: 6.21 K/UL — SIGNIFICANT CHANGE UP (ref 3.8–10.5)
WBC # FLD AUTO: 6.21 K/UL — SIGNIFICANT CHANGE UP (ref 3.8–10.5)

## 2021-09-09 PROCEDURE — 80230 DRUG ASSAY INFLIXIMAB: CPT

## 2021-09-09 PROCEDURE — 80053 COMPREHEN METABOLIC PANEL: CPT

## 2021-09-09 PROCEDURE — 96415 CHEMO IV INFUSION ADDL HR: CPT

## 2021-09-09 PROCEDURE — 86140 C-REACTIVE PROTEIN: CPT

## 2021-09-09 PROCEDURE — 85027 COMPLETE CBC AUTOMATED: CPT

## 2021-09-09 PROCEDURE — 96413 CHEMO IV INFUSION 1 HR: CPT

## 2021-09-09 PROCEDURE — 36415 COLL VENOUS BLD VENIPUNCTURE: CPT

## 2021-09-09 RX ORDER — INFLIXIMAB-DYYB 120 MG/ML
800 INJECTION SUBCUTANEOUS ONCE
Refills: 0 | Status: COMPLETED | OUTPATIENT
Start: 2021-09-09 | End: 2021-09-09

## 2021-09-09 RX ADMIN — INFLIXIMAB-DYYB 800 MILLIGRAM(S): 120 INJECTION SUBCUTANEOUS at 11:10

## 2021-09-09 RX ADMIN — INFLIXIMAB-DYYB 155 MILLIGRAM(S): 120 INJECTION SUBCUTANEOUS at 09:10

## 2021-10-07 ENCOUNTER — APPOINTMENT (OUTPATIENT)
Age: 31
End: 2021-10-07

## 2021-10-29 ENCOUNTER — OUTPATIENT (OUTPATIENT)
Dept: OUTPATIENT SERVICES | Facility: HOSPITAL | Age: 31
LOS: 1 days | End: 2021-10-29
Payer: COMMERCIAL

## 2021-10-29 ENCOUNTER — APPOINTMENT (OUTPATIENT)
Age: 31
End: 2021-10-29

## 2021-10-29 VITALS
HEART RATE: 72 BPM | OXYGEN SATURATION: 98 % | RESPIRATION RATE: 15 BRPM | TEMPERATURE: 98 F | SYSTOLIC BLOOD PRESSURE: 118 MMHG | DIASTOLIC BLOOD PRESSURE: 76 MMHG

## 2021-10-29 VITALS
SYSTOLIC BLOOD PRESSURE: 112 MMHG | TEMPERATURE: 98 F | HEIGHT: 71 IN | HEART RATE: 84 BPM | RESPIRATION RATE: 17 BRPM | OXYGEN SATURATION: 99 % | DIASTOLIC BLOOD PRESSURE: 82 MMHG | WEIGHT: 169.09 LBS

## 2021-10-29 DIAGNOSIS — K50.90 CROHN'S DISEASE, UNSPECIFIED, WITHOUT COMPLICATIONS: ICD-10-CM

## 2021-10-29 DIAGNOSIS — Z98.890 OTHER SPECIFIED POSTPROCEDURAL STATES: Chronic | ICD-10-CM

## 2021-10-29 LAB
ALBUMIN SERPL ELPH-MCNC: 4.4 G/DL — SIGNIFICANT CHANGE UP (ref 3.3–5)
ALP SERPL-CCNC: 64 U/L — SIGNIFICANT CHANGE UP (ref 40–120)
ALT FLD-CCNC: 16 U/L — SIGNIFICANT CHANGE UP (ref 10–45)
ANION GAP SERPL CALC-SCNC: 8 MMOL/L — SIGNIFICANT CHANGE UP (ref 5–17)
AST SERPL-CCNC: 19 U/L — SIGNIFICANT CHANGE UP (ref 10–40)
BILIRUB SERPL-MCNC: 0.4 MG/DL — SIGNIFICANT CHANGE UP (ref 0.2–1.2)
BUN SERPL-MCNC: 10 MG/DL — SIGNIFICANT CHANGE UP (ref 7–23)
CALCIUM SERPL-MCNC: 9.4 MG/DL — SIGNIFICANT CHANGE UP (ref 8.4–10.5)
CHLORIDE SERPL-SCNC: 105 MMOL/L — SIGNIFICANT CHANGE UP (ref 96–108)
CO2 SERPL-SCNC: 26 MMOL/L — SIGNIFICANT CHANGE UP (ref 22–31)
CREAT SERPL-MCNC: 0.94 MG/DL — SIGNIFICANT CHANGE UP (ref 0.5–1.3)
CRP SERPL-MCNC: <3 MG/L — SIGNIFICANT CHANGE UP (ref 0–4)
GLUCOSE SERPL-MCNC: 114 MG/DL — HIGH (ref 70–99)
HCT VFR BLD CALC: 40.3 % — SIGNIFICANT CHANGE UP (ref 39–50)
HGB BLD-MCNC: 12.8 G/DL — LOW (ref 13–17)
MCHC RBC-ENTMCNC: 25.4 PG — LOW (ref 27–34)
MCHC RBC-ENTMCNC: 31.8 GM/DL — LOW (ref 32–36)
MCV RBC AUTO: 80.1 FL — SIGNIFICANT CHANGE UP (ref 80–100)
NRBC # BLD: 0 /100 WBCS — SIGNIFICANT CHANGE UP (ref 0–0)
PLATELET # BLD AUTO: 314 K/UL — SIGNIFICANT CHANGE UP (ref 150–400)
POTASSIUM SERPL-MCNC: 4.2 MMOL/L — SIGNIFICANT CHANGE UP (ref 3.5–5.3)
POTASSIUM SERPL-SCNC: 4.2 MMOL/L — SIGNIFICANT CHANGE UP (ref 3.5–5.3)
PROT SERPL-MCNC: 7.5 G/DL — SIGNIFICANT CHANGE UP (ref 6–8.3)
RBC # BLD: 5.03 M/UL — SIGNIFICANT CHANGE UP (ref 4.2–5.8)
RBC # FLD: 15.4 % — HIGH (ref 10.3–14.5)
SODIUM SERPL-SCNC: 139 MMOL/L — SIGNIFICANT CHANGE UP (ref 135–145)
WBC # BLD: 6.85 K/UL — SIGNIFICANT CHANGE UP (ref 3.8–10.5)
WBC # FLD AUTO: 6.85 K/UL — SIGNIFICANT CHANGE UP (ref 3.8–10.5)

## 2021-10-29 PROCEDURE — 96415 CHEMO IV INFUSION ADDL HR: CPT

## 2021-10-29 PROCEDURE — 80230 DRUG ASSAY INFLIXIMAB: CPT

## 2021-10-29 PROCEDURE — 86140 C-REACTIVE PROTEIN: CPT

## 2021-10-29 PROCEDURE — 85027 COMPLETE CBC AUTOMATED: CPT

## 2021-10-29 PROCEDURE — 36415 COLL VENOUS BLD VENIPUNCTURE: CPT

## 2021-10-29 PROCEDURE — 96413 CHEMO IV INFUSION 1 HR: CPT

## 2021-10-29 PROCEDURE — 80053 COMPREHEN METABOLIC PANEL: CPT

## 2021-10-29 RX ORDER — INFLIXIMAB-DYYB 120 MG/ML
800 INJECTION SUBCUTANEOUS ONCE
Refills: 0 | Status: COMPLETED | OUTPATIENT
Start: 2021-10-29 | End: 2021-10-29

## 2021-10-29 RX ADMIN — INFLIXIMAB-DYYB 800 MILLIGRAM(S): 120 INJECTION SUBCUTANEOUS at 15:45

## 2021-10-29 RX ADMIN — INFLIXIMAB-DYYB 155 MILLIGRAM(S): 120 INJECTION SUBCUTANEOUS at 14:15

## 2021-11-05 LAB
ANTIBODIES TO INFLIXIMAB (ATI) CONCENTRATION: < 3.1 U/ML — SIGNIFICANT CHANGE UP
PROMETHEUS ANSER IFX RESULT: SIGNIFICANT CHANGE UP
PROMETHEUS LABORATORY FOOTER: SIGNIFICANT CHANGE UP
SERUM INFLIXIMAB (IFX) CONCENTRATION: 12.9 UG/ML — SIGNIFICANT CHANGE UP

## 2021-12-03 ENCOUNTER — APPOINTMENT (OUTPATIENT)
Age: 31
End: 2021-12-03

## 2021-12-03 ENCOUNTER — OUTPATIENT (OUTPATIENT)
Dept: OUTPATIENT SERVICES | Facility: HOSPITAL | Age: 31
LOS: 1 days | End: 2021-12-03
Payer: COMMERCIAL

## 2021-12-03 VITALS
DIASTOLIC BLOOD PRESSURE: 78 MMHG | SYSTOLIC BLOOD PRESSURE: 140 MMHG | RESPIRATION RATE: 18 BRPM | HEART RATE: 86 BPM | TEMPERATURE: 98 F | OXYGEN SATURATION: 97 %

## 2021-12-03 DIAGNOSIS — K50.90 CROHN'S DISEASE, UNSPECIFIED, WITHOUT COMPLICATIONS: ICD-10-CM

## 2021-12-03 DIAGNOSIS — Z98.890 OTHER SPECIFIED POSTPROCEDURAL STATES: Chronic | ICD-10-CM

## 2021-12-03 LAB
ALBUMIN SERPL ELPH-MCNC: 4.7 G/DL — SIGNIFICANT CHANGE UP (ref 3.3–5)
ALP SERPL-CCNC: 65 U/L — SIGNIFICANT CHANGE UP (ref 40–120)
ALT FLD-CCNC: 16 U/L — SIGNIFICANT CHANGE UP (ref 10–45)
ANION GAP SERPL CALC-SCNC: 10 MMOL/L — SIGNIFICANT CHANGE UP (ref 5–17)
AST SERPL-CCNC: 19 U/L — SIGNIFICANT CHANGE UP (ref 10–40)
BILIRUB SERPL-MCNC: 0.5 MG/DL — SIGNIFICANT CHANGE UP (ref 0.2–1.2)
BUN SERPL-MCNC: 10 MG/DL — SIGNIFICANT CHANGE UP (ref 7–23)
CALCIUM SERPL-MCNC: 9.6 MG/DL — SIGNIFICANT CHANGE UP (ref 8.4–10.5)
CHLORIDE SERPL-SCNC: 102 MMOL/L — SIGNIFICANT CHANGE UP (ref 96–108)
CO2 SERPL-SCNC: 28 MMOL/L — SIGNIFICANT CHANGE UP (ref 22–31)
CREAT SERPL-MCNC: 0.88 MG/DL — SIGNIFICANT CHANGE UP (ref 0.5–1.3)
CRP SERPL-MCNC: <3 MG/L — SIGNIFICANT CHANGE UP (ref 0–4)
GLUCOSE SERPL-MCNC: 142 MG/DL — HIGH (ref 70–99)
HCT VFR BLD CALC: 49.6 % — SIGNIFICANT CHANGE UP (ref 39–50)
HGB BLD-MCNC: 15.9 G/DL — SIGNIFICANT CHANGE UP (ref 13–17)
MCHC RBC-ENTMCNC: 27.8 PG — SIGNIFICANT CHANGE UP (ref 27–34)
MCHC RBC-ENTMCNC: 32.1 GM/DL — SIGNIFICANT CHANGE UP (ref 32–36)
MCV RBC AUTO: 86.9 FL — SIGNIFICANT CHANGE UP (ref 80–100)
NRBC # BLD: 0 /100 WBCS — SIGNIFICANT CHANGE UP (ref 0–0)
PLATELET # BLD AUTO: 268 K/UL — SIGNIFICANT CHANGE UP (ref 150–400)
POTASSIUM SERPL-MCNC: 4.5 MMOL/L — SIGNIFICANT CHANGE UP (ref 3.5–5.3)
POTASSIUM SERPL-SCNC: 4.5 MMOL/L — SIGNIFICANT CHANGE UP (ref 3.5–5.3)
PROT SERPL-MCNC: 7.6 G/DL — SIGNIFICANT CHANGE UP (ref 6–8.3)
RBC # BLD: 5.71 M/UL — SIGNIFICANT CHANGE UP (ref 4.2–5.8)
RBC # FLD: 20.8 % — HIGH (ref 10.3–14.5)
SODIUM SERPL-SCNC: 140 MMOL/L — SIGNIFICANT CHANGE UP (ref 135–145)
WBC # BLD: 8.95 K/UL — SIGNIFICANT CHANGE UP (ref 3.8–10.5)
WBC # FLD AUTO: 8.95 K/UL — SIGNIFICANT CHANGE UP (ref 3.8–10.5)

## 2021-12-03 PROCEDURE — 96415 CHEMO IV INFUSION ADDL HR: CPT

## 2021-12-03 PROCEDURE — 36415 COLL VENOUS BLD VENIPUNCTURE: CPT

## 2021-12-03 PROCEDURE — 96413 CHEMO IV INFUSION 1 HR: CPT

## 2021-12-03 PROCEDURE — 80053 COMPREHEN METABOLIC PANEL: CPT

## 2021-12-03 PROCEDURE — 86140 C-REACTIVE PROTEIN: CPT

## 2021-12-03 PROCEDURE — 80230 DRUG ASSAY INFLIXIMAB: CPT

## 2021-12-03 PROCEDURE — 85027 COMPLETE CBC AUTOMATED: CPT

## 2021-12-03 RX ORDER — INFLIXIMAB-DYYB 120 MG/ML
800 INJECTION SUBCUTANEOUS ONCE
Refills: 0 | Status: COMPLETED | OUTPATIENT
Start: 2021-12-03 | End: 2021-12-03

## 2021-12-03 RX ADMIN — INFLIXIMAB-DYYB 155 MILLIGRAM(S): 120 INJECTION SUBCUTANEOUS at 15:20

## 2021-12-03 RX ADMIN — INFLIXIMAB-DYYB 800 MILLIGRAM(S): 120 INJECTION SUBCUTANEOUS at 17:20

## 2021-12-13 LAB
ANTIBODIES TO INFLIXIMAB (ATI) CONCENTRATION: < 3.1 U/ML — SIGNIFICANT CHANGE UP
PROMETHEUS ANSER IFX RESULT: SIGNIFICANT CHANGE UP
PROMETHEUS LABORATORY FOOTER: SIGNIFICANT CHANGE UP
SERUM INFLIXIMAB (IFX) CONCENTRATION: 15.5 UG/ML — SIGNIFICANT CHANGE UP

## 2021-12-30 ENCOUNTER — OUTPATIENT (OUTPATIENT)
Dept: OUTPATIENT SERVICES | Facility: HOSPITAL | Age: 31
LOS: 1 days | End: 2021-12-30
Payer: COMMERCIAL

## 2021-12-30 ENCOUNTER — APPOINTMENT (OUTPATIENT)
Age: 31
End: 2021-12-30

## 2021-12-30 DIAGNOSIS — Z98.890 OTHER SPECIFIED POSTPROCEDURAL STATES: Chronic | ICD-10-CM

## 2021-12-30 DIAGNOSIS — K50.90 CROHN'S DISEASE, UNSPECIFIED, WITHOUT COMPLICATIONS: ICD-10-CM

## 2021-12-30 LAB
ALBUMIN SERPL ELPH-MCNC: 4.3 G/DL — SIGNIFICANT CHANGE UP (ref 3.3–5)
ALP SERPL-CCNC: 63 U/L — SIGNIFICANT CHANGE UP (ref 40–120)
ALT FLD-CCNC: 40 U/L — SIGNIFICANT CHANGE UP (ref 10–45)
ANION GAP SERPL CALC-SCNC: 8 MMOL/L — SIGNIFICANT CHANGE UP (ref 5–17)
AST SERPL-CCNC: 45 U/L — HIGH (ref 10–40)
BILIRUB SERPL-MCNC: 0.2 MG/DL — SIGNIFICANT CHANGE UP (ref 0.2–1.2)
BUN SERPL-MCNC: 11 MG/DL — SIGNIFICANT CHANGE UP (ref 7–23)
CALCIUM SERPL-MCNC: 8.8 MG/DL — SIGNIFICANT CHANGE UP (ref 8.4–10.5)
CHLORIDE SERPL-SCNC: 108 MMOL/L — SIGNIFICANT CHANGE UP (ref 96–108)
CO2 SERPL-SCNC: 27 MMOL/L — SIGNIFICANT CHANGE UP (ref 22–31)
CREAT SERPL-MCNC: 0.95 MG/DL — SIGNIFICANT CHANGE UP (ref 0.5–1.3)
CRP SERPL-MCNC: <3 MG/L — SIGNIFICANT CHANGE UP (ref 0–4)
GLUCOSE SERPL-MCNC: 96 MG/DL — SIGNIFICANT CHANGE UP (ref 70–99)
HCT VFR BLD CALC: 40.6 % — SIGNIFICANT CHANGE UP (ref 39–50)
HGB BLD-MCNC: 13.9 G/DL — SIGNIFICANT CHANGE UP (ref 13–17)
MCHC RBC-ENTMCNC: 29.3 PG — SIGNIFICANT CHANGE UP (ref 27–34)
MCHC RBC-ENTMCNC: 34.2 GM/DL — SIGNIFICANT CHANGE UP (ref 32–36)
MCV RBC AUTO: 85.7 FL — SIGNIFICANT CHANGE UP (ref 80–100)
NRBC # BLD: 0 /100 WBCS — SIGNIFICANT CHANGE UP (ref 0–0)
PLATELET # BLD AUTO: 253 K/UL — SIGNIFICANT CHANGE UP (ref 150–400)
POTASSIUM SERPL-MCNC: 4.5 MMOL/L — SIGNIFICANT CHANGE UP (ref 3.5–5.3)
POTASSIUM SERPL-SCNC: 4.5 MMOL/L — SIGNIFICANT CHANGE UP (ref 3.5–5.3)
PROT SERPL-MCNC: 6.3 G/DL — SIGNIFICANT CHANGE UP (ref 6–8.3)
RBC # BLD: 4.74 M/UL — SIGNIFICANT CHANGE UP (ref 4.2–5.8)
RBC # FLD: 17.8 % — HIGH (ref 10.3–14.5)
SODIUM SERPL-SCNC: 143 MMOL/L — SIGNIFICANT CHANGE UP (ref 135–145)
WBC # BLD: 7.48 K/UL — SIGNIFICANT CHANGE UP (ref 3.8–10.5)
WBC # FLD AUTO: 7.48 K/UL — SIGNIFICANT CHANGE UP (ref 3.8–10.5)

## 2021-12-30 PROCEDURE — 86140 C-REACTIVE PROTEIN: CPT

## 2021-12-30 PROCEDURE — 36415 COLL VENOUS BLD VENIPUNCTURE: CPT

## 2021-12-30 PROCEDURE — 80053 COMPREHEN METABOLIC PANEL: CPT

## 2021-12-30 PROCEDURE — 96413 CHEMO IV INFUSION 1 HR: CPT

## 2021-12-30 PROCEDURE — 96415 CHEMO IV INFUSION ADDL HR: CPT

## 2021-12-30 PROCEDURE — 85027 COMPLETE CBC AUTOMATED: CPT

## 2021-12-30 PROCEDURE — 80230 DRUG ASSAY INFLIXIMAB: CPT

## 2021-12-30 RX ORDER — INFLIXIMAB-DYYB 120 MG/ML
800 INJECTION SUBCUTANEOUS ONCE
Refills: 0 | Status: COMPLETED | OUTPATIENT
Start: 2021-12-30 | End: 2021-12-30

## 2021-12-30 RX ADMIN — INFLIXIMAB-DYYB 155 MILLIGRAM(S): 120 INJECTION SUBCUTANEOUS at 14:53

## 2021-12-30 RX ADMIN — INFLIXIMAB-DYYB 800 MILLIGRAM(S): 120 INJECTION SUBCUTANEOUS at 15:53

## 2022-01-06 LAB
ANTIBODIES TO INFLIXIMAB (ATI) CONCENTRATION: < 3.1 U/ML — SIGNIFICANT CHANGE UP
PROMETHEUS ANSER IFX RESULT: SIGNIFICANT CHANGE UP
PROMETHEUS LABORATORY FOOTER: SIGNIFICANT CHANGE UP
SERUM INFLIXIMAB (IFX) CONCENTRATION: 27.6 UG/ML — SIGNIFICANT CHANGE UP

## 2022-02-04 ENCOUNTER — APPOINTMENT (OUTPATIENT)
Age: 32
End: 2022-02-04

## 2022-02-04 ENCOUNTER — OUTPATIENT (OUTPATIENT)
Dept: OUTPATIENT SERVICES | Facility: HOSPITAL | Age: 32
LOS: 1 days | End: 2022-02-04
Payer: COMMERCIAL

## 2022-02-04 VITALS
WEIGHT: 169.09 LBS | DIASTOLIC BLOOD PRESSURE: 86 MMHG | TEMPERATURE: 100 F | OXYGEN SATURATION: 97 % | HEART RATE: 76 BPM | SYSTOLIC BLOOD PRESSURE: 127 MMHG | HEIGHT: 71 IN | RESPIRATION RATE: 18 BRPM

## 2022-02-04 DIAGNOSIS — K50.90 CROHN'S DISEASE, UNSPECIFIED, WITHOUT COMPLICATIONS: ICD-10-CM

## 2022-02-04 DIAGNOSIS — Z98.890 OTHER SPECIFIED POSTPROCEDURAL STATES: Chronic | ICD-10-CM

## 2022-02-04 LAB
ALBUMIN SERPL ELPH-MCNC: 4 G/DL — SIGNIFICANT CHANGE UP (ref 3.3–5)
ALP SERPL-CCNC: 58 U/L — SIGNIFICANT CHANGE UP (ref 40–120)
ALT FLD-CCNC: 21 U/L — SIGNIFICANT CHANGE UP (ref 10–45)
ANION GAP SERPL CALC-SCNC: 12 MMOL/L — SIGNIFICANT CHANGE UP (ref 5–17)
AST SERPL-CCNC: 22 U/L — SIGNIFICANT CHANGE UP (ref 10–40)
BILIRUB SERPL-MCNC: 0.2 MG/DL — SIGNIFICANT CHANGE UP (ref 0.2–1.2)
BUN SERPL-MCNC: 9 MG/DL — SIGNIFICANT CHANGE UP (ref 7–23)
CALCIUM SERPL-MCNC: 9.1 MG/DL — SIGNIFICANT CHANGE UP (ref 8.4–10.5)
CHLORIDE SERPL-SCNC: 105 MMOL/L — SIGNIFICANT CHANGE UP (ref 96–108)
CO2 SERPL-SCNC: 22 MMOL/L — SIGNIFICANT CHANGE UP (ref 22–31)
CREAT SERPL-MCNC: 0.95 MG/DL — SIGNIFICANT CHANGE UP (ref 0.5–1.3)
CRP SERPL-MCNC: <3 MG/L — SIGNIFICANT CHANGE UP (ref 0–4)
GLUCOSE SERPL-MCNC: 124 MG/DL — HIGH (ref 70–99)
HCT VFR BLD CALC: 40.9 % — SIGNIFICANT CHANGE UP (ref 39–50)
HGB BLD-MCNC: 14.4 G/DL — SIGNIFICANT CHANGE UP (ref 13–17)
MCHC RBC-ENTMCNC: 31 PG — SIGNIFICANT CHANGE UP (ref 27–34)
MCHC RBC-ENTMCNC: 35.2 GM/DL — SIGNIFICANT CHANGE UP (ref 32–36)
MCV RBC AUTO: 88 FL — SIGNIFICANT CHANGE UP (ref 80–100)
NRBC # BLD: 0 /100 WBCS — SIGNIFICANT CHANGE UP (ref 0–0)
PLATELET # BLD AUTO: 246 K/UL — SIGNIFICANT CHANGE UP (ref 150–400)
POTASSIUM SERPL-MCNC: 4 MMOL/L — SIGNIFICANT CHANGE UP (ref 3.5–5.3)
POTASSIUM SERPL-SCNC: 4 MMOL/L — SIGNIFICANT CHANGE UP (ref 3.5–5.3)
PROT SERPL-MCNC: 6.4 G/DL — SIGNIFICANT CHANGE UP (ref 6–8.3)
RBC # BLD: 4.65 M/UL — SIGNIFICANT CHANGE UP (ref 4.2–5.8)
RBC # FLD: 14.7 % — HIGH (ref 10.3–14.5)
SODIUM SERPL-SCNC: 139 MMOL/L — SIGNIFICANT CHANGE UP (ref 135–145)
WBC # BLD: 7.99 K/UL — SIGNIFICANT CHANGE UP (ref 3.8–10.5)
WBC # FLD AUTO: 7.99 K/UL — SIGNIFICANT CHANGE UP (ref 3.8–10.5)

## 2022-02-04 PROCEDURE — 82542 COL CHROMOTOGRAPHY QUAL/QUAN: CPT

## 2022-02-04 PROCEDURE — 85027 COMPLETE CBC AUTOMATED: CPT

## 2022-02-04 PROCEDURE — 80230 DRUG ASSAY INFLIXIMAB: CPT

## 2022-02-04 PROCEDURE — 96365 THER/PROPH/DIAG IV INF INIT: CPT

## 2022-02-04 PROCEDURE — 96366 THER/PROPH/DIAG IV INF ADDON: CPT

## 2022-02-04 PROCEDURE — 80053 COMPREHEN METABOLIC PANEL: CPT

## 2022-02-04 PROCEDURE — 86140 C-REACTIVE PROTEIN: CPT

## 2022-02-04 PROCEDURE — 36415 COLL VENOUS BLD VENIPUNCTURE: CPT

## 2022-02-04 RX ORDER — INFLIXIMAB-DYYB 120 MG/ML
800 INJECTION SUBCUTANEOUS ONCE
Refills: 0 | Status: COMPLETED | OUTPATIENT
Start: 2022-02-04 | End: 2022-02-04

## 2022-02-04 RX ADMIN — INFLIXIMAB-DYYB 800 MILLIGRAM(S): 120 INJECTION SUBCUTANEOUS at 17:30

## 2022-02-04 RX ADMIN — INFLIXIMAB-DYYB 155 MILLIGRAM(S): 120 INJECTION SUBCUTANEOUS at 15:44

## 2022-02-10 LAB
ANTIBODIES TO INFLIXIMAB (ATI) CONCENTRATION: < 3.1 U/ML — SIGNIFICANT CHANGE UP
PROMETHEUS ANSER IFX RESULT: SIGNIFICANT CHANGE UP
PROMETHEUS LABORATORY FOOTER: SIGNIFICANT CHANGE UP
SERUM INFLIXIMAB (IFX) CONCENTRATION: 21.3 UG/ML — SIGNIFICANT CHANGE UP

## 2022-03-07 ENCOUNTER — APPOINTMENT (OUTPATIENT)
Age: 32
End: 2022-03-07

## 2022-03-07 ENCOUNTER — OUTPATIENT (OUTPATIENT)
Dept: OUTPATIENT SERVICES | Facility: HOSPITAL | Age: 32
LOS: 1 days | End: 2022-03-07
Payer: COMMERCIAL

## 2022-03-07 VITALS
HEART RATE: 64 BPM | SYSTOLIC BLOOD PRESSURE: 114 MMHG | DIASTOLIC BLOOD PRESSURE: 75 MMHG | HEIGHT: 71 IN | RESPIRATION RATE: 18 BRPM | WEIGHT: 169.98 LBS | TEMPERATURE: 98 F | OXYGEN SATURATION: 96 %

## 2022-03-07 DIAGNOSIS — Z98.890 OTHER SPECIFIED POSTPROCEDURAL STATES: Chronic | ICD-10-CM

## 2022-03-07 DIAGNOSIS — K50.90 CROHN'S DISEASE, UNSPECIFIED, WITHOUT COMPLICATIONS: ICD-10-CM

## 2022-03-07 LAB
ALBUMIN SERPL ELPH-MCNC: 3.7 G/DL — SIGNIFICANT CHANGE UP (ref 3.3–5)
ALP SERPL-CCNC: 52 U/L — SIGNIFICANT CHANGE UP (ref 40–120)
ALT FLD-CCNC: 18 U/L — SIGNIFICANT CHANGE UP (ref 10–45)
ANION GAP SERPL CALC-SCNC: 8 MMOL/L — SIGNIFICANT CHANGE UP (ref 5–17)
AST SERPL-CCNC: 21 U/L — SIGNIFICANT CHANGE UP (ref 10–40)
BILIRUB SERPL-MCNC: 0.4 MG/DL — SIGNIFICANT CHANGE UP (ref 0.2–1.2)
BUN SERPL-MCNC: 10 MG/DL — SIGNIFICANT CHANGE UP (ref 7–23)
CALCIUM SERPL-MCNC: 8.7 MG/DL — SIGNIFICANT CHANGE UP (ref 8.4–10.5)
CHLORIDE SERPL-SCNC: 107 MMOL/L — SIGNIFICANT CHANGE UP (ref 96–108)
CO2 SERPL-SCNC: 24 MMOL/L — SIGNIFICANT CHANGE UP (ref 22–31)
CREAT SERPL-MCNC: 0.88 MG/DL — SIGNIFICANT CHANGE UP (ref 0.5–1.3)
CRP SERPL-MCNC: <3 MG/L — SIGNIFICANT CHANGE UP (ref 0–4)
EGFR: 116 ML/MIN/1.73M2 — SIGNIFICANT CHANGE UP
GLUCOSE SERPL-MCNC: 96 MG/DL — SIGNIFICANT CHANGE UP (ref 70–99)
HCT VFR BLD CALC: 38.9 % — LOW (ref 39–50)
HGB BLD-MCNC: 12.8 G/DL — LOW (ref 13–17)
MCHC RBC-ENTMCNC: 29.8 PG — SIGNIFICANT CHANGE UP (ref 27–34)
MCHC RBC-ENTMCNC: 32.9 GM/DL — SIGNIFICANT CHANGE UP (ref 32–36)
MCV RBC AUTO: 90.5 FL — SIGNIFICANT CHANGE UP (ref 80–100)
NRBC # BLD: 0 /100 WBCS — SIGNIFICANT CHANGE UP (ref 0–0)
PLATELET # BLD AUTO: 202 K/UL — SIGNIFICANT CHANGE UP (ref 150–400)
POTASSIUM SERPL-MCNC: 4 MMOL/L — SIGNIFICANT CHANGE UP (ref 3.5–5.3)
POTASSIUM SERPL-SCNC: 4 MMOL/L — SIGNIFICANT CHANGE UP (ref 3.5–5.3)
PROT SERPL-MCNC: 5.9 G/DL — LOW (ref 6–8.3)
RBC # BLD: 4.3 M/UL — SIGNIFICANT CHANGE UP (ref 4.2–5.8)
RBC # FLD: 13.2 % — SIGNIFICANT CHANGE UP (ref 10.3–14.5)
SODIUM SERPL-SCNC: 139 MMOL/L — SIGNIFICANT CHANGE UP (ref 135–145)
WBC # BLD: 5.45 K/UL — SIGNIFICANT CHANGE UP (ref 3.8–10.5)
WBC # FLD AUTO: 5.45 K/UL — SIGNIFICANT CHANGE UP (ref 3.8–10.5)

## 2022-03-07 PROCEDURE — 96415 CHEMO IV INFUSION ADDL HR: CPT

## 2022-03-07 PROCEDURE — 80230 DRUG ASSAY INFLIXIMAB: CPT

## 2022-03-07 PROCEDURE — 86140 C-REACTIVE PROTEIN: CPT

## 2022-03-07 PROCEDURE — 80053 COMPREHEN METABOLIC PANEL: CPT

## 2022-03-07 PROCEDURE — 82542 COL CHROMOTOGRAPHY QUAL/QUAN: CPT

## 2022-03-07 PROCEDURE — 96413 CHEMO IV INFUSION 1 HR: CPT

## 2022-03-07 PROCEDURE — 36415 COLL VENOUS BLD VENIPUNCTURE: CPT

## 2022-03-07 PROCEDURE — 85027 COMPLETE CBC AUTOMATED: CPT

## 2022-03-07 RX ORDER — INFLIXIMAB-DYYB 120 MG/ML
800 INJECTION SUBCUTANEOUS ONCE
Refills: 0 | Status: COMPLETED | OUTPATIENT
Start: 2022-03-07 | End: 2022-03-07

## 2022-03-07 RX ADMIN — INFLIXIMAB-DYYB 155 MILLIGRAM(S): 120 INJECTION SUBCUTANEOUS at 13:56

## 2022-03-07 RX ADMIN — INFLIXIMAB-DYYB 800 MILLIGRAM(S): 120 INJECTION SUBCUTANEOUS at 15:20

## 2022-03-10 LAB
ANTIBODIES TO INFLIXIMAB (ATI) CONCENTRATION: < 3.1 U/ML — SIGNIFICANT CHANGE UP
PROMETHEUS ANSER IFX RESULT: SIGNIFICANT CHANGE UP
PROMETHEUS LABORATORY FOOTER: SIGNIFICANT CHANGE UP
SERUM INFLIXIMAB (IFX) CONCENTRATION: 27.5 UG/ML — SIGNIFICANT CHANGE UP

## 2022-03-16 ENCOUNTER — APPOINTMENT (OUTPATIENT)
Dept: GASTROENTEROLOGY | Facility: CLINIC | Age: 32
End: 2022-03-16
Payer: COMMERCIAL

## 2022-03-16 ENCOUNTER — LABORATORY RESULT (OUTPATIENT)
Age: 32
End: 2022-03-16

## 2022-03-16 VITALS
HEIGHT: 71 IN | TEMPERATURE: 95.4 F | RESPIRATION RATE: 14 BRPM | OXYGEN SATURATION: 98 % | BODY MASS INDEX: 23.8 KG/M2 | SYSTOLIC BLOOD PRESSURE: 110 MMHG | WEIGHT: 170 LBS | HEART RATE: 68 BPM | DIASTOLIC BLOOD PRESSURE: 70 MMHG

## 2022-03-16 PROCEDURE — 99214 OFFICE O/P EST MOD 30 MIN: CPT

## 2022-03-17 LAB
25(OH)D3 SERPL-MCNC: 46.3 NG/ML
IRON SATN MFR SERPL: 23 %
IRON SERPL-MCNC: 103 UG/DL
TIBC SERPL-MCNC: 451 UG/DL
UIBC SERPL-MCNC: 348 UG/DL
VIT B12 SERPL-MCNC: 198 PG/ML

## 2022-03-17 NOTE — HISTORY OF PRESENT ILLNESS
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 \par \par Stools at baseline- 1-2/day, no blood or mucous \par Denies fever, chills, nausea, vomiting, melena, hematochezia, diarrhea, nocturnal symptoms\par Good appetite and stable weight. \par No EIMs (Joint pain, mouth ulcers, skin rash)\par \par Last colon 12/2020 -  Crohns disease in remission and nearly resolved anastomotic ulcer. \par \par IFX q4 weeks \par last infusion 03/07/2022\par CRP <3\par IFX levels - 27.5 with no antibodies\par \par Previous history \par June 2019, patient's IFX dose was increased to 10mg/kg from 5mg/kg. After 2 sessions of the increased dose, he underwent a colonoscopy 9/5/19 to see whether there was any improvement in the ulcerations with higher dose of infliximab. Colonoscopy showed i1 disease, and anastomotic ulcer was still present friable with spontaneous oozing, seemed to have extended since prior colonoscopy. At that time discussed the possibility of a hyperbaric chamber with high dose oxygen as treatment for the ulcer, which has been tried for other indications. He was unable to find a place that accepted his insurance. \par \par Colonoscopy 9/5/19: side to side anastomosis, Leonard-TI with 2 aphthous ulcers, Ruutgers score i1, anastomotic ulcer friable with spontaneous oozing. Seemed to have extended since prior colon despite increased IFX. Ulcer not likely inflammatory in nature. \par Pathology: unremarkable\par \par EIMs: No\par \par CBC, CMP unremarkable from previous infusion \par -------------------------------------------------------------------------------------------------------------------\par PRIOR Hx - \par 28 Y M, hx of prior small bowel resection - first for ileo-cecal intussusception as an infant in 1990, and then distal SBR in 2013 for GI bleed; presents today s/p hospitalization and emergent weekend endoscopy for GI bleed and new diagnosis of Crohn's Disease. bx apparently from ileocolonic anastomosis, but VCE showed numerous diffuse sb aphthae.\par \par The patient had sx from his teen years, but moved around and didn't seek care during periods of wellness and also didn't have insurance coverage for a portion of the time. Before moving to [place], he had several bleeding episodes, one which req. laparoscopic evaluation and resection of small bowel (in [place]) with unclear pathology. NO one has told him he had any evidence of Crohn's in past. \par \par Pt was hospitalized in February and in April for melanic stools and anemia. He was transfused both admissions. He also had EGD/Colonoscopy and VCE (no reports available) that revealed multiple ulcers at prior ileo-cecal anastomosis w/biopsies that showed active chronic enteritis. \par EGD unrevealing.\par \par Pt was admitted to [facility] from 7/20-7/27/18 for recurrent LGIB 2/2 ileocolic anastomotic ulcer. He underwent a laparoscopic ileocolic resection with side-side anastomosis on 7/23/17. \par Since then, he has received two more loading doses of IFX. \par \par \par Prior biopsies of small bowel reveal active chronic enteritis with cryptitis and crypt architecture distortion. VCE revealed multiple ulcers in the jejunum and terminal ileum. \par Review of op report from [place] 2013 for GIB from anastamotic ulcer:\par 1. ileocectomy with Resection of previous ileal-ileal anastamosis\par 2. Mid ileum resection\par 3. Resection of previous jejunum to jeunum anastamosis; ?which had been bypassed; resected in setting of GIB. \par  \par Fam hx: no IBD or CRC\par Social hx: tobacco user 5x/week, alcohol use 5 drinks/week, marijuana use on occasion not weekly, NSAID use once/two weeks for HAs. \par \par

## 2022-03-17 NOTE — ASSESSMENT
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 \par \par # CD in clinical, endoscopic and histologic remission\par - Continue with IFX 10 mg/kg q4 weeks, good drug levels \par - Continue to monitor drug levels (previous drug levels <0.04 and Ab <0.22)\par -Denies any bleeding episodes, previous plan for referral for hyperbaric chamber treatment if anastomotic ulcer recurs \par \par \par #Mild anemia, was stable prior, ws on Fe infusions with Dr. Brar\par Will do Fe, B12 and Vitamin D levels \par continue PO B12 and Vitamin D supplements \par I am concerned that the source of anemia is anastomotic ulcer recurrence.\par \par # bile acid diarrhea\par - c/w cholestyramine PRN, titrate to 1-2 BMs daily (asked him to consider taking with his largest meals)\par \par #HSV \par - valacyclovir 1GM PRN due to recurrent HSV mouth sores \par \par #HCM\par - PCP referral sent \par - Still smoking Cigarettes 1-2/month, advised to cut back completely \par - emphasized importance of NSAID avoidance, denies use currently \par - denies depression - sees therapist regularly \par - never takes Flu vaccine \par - S/p COVID vaccine and booster\par - emphasized importance of tobacco, cocaine, and alcohol avoidance \par - immune to Hep B, consider checking Hep A immunity with next infusion\par - TB negative 2018 \par - UTD derm FSBE\par \par F/u in 6 months to re-evaluate for anemia and then plan for Colonoscopy (possible APC of the anastomotic ulcer if still seen)\par \par Emelia Eaton MD\par Advanced IBD fellow

## 2022-03-17 NOTE — PHYSICAL EXAM
[General Appearance - Alert] : alert [General Appearance - In No Acute Distress] : in no acute distress [Neck Appearance] : the appearance of the neck was normal [Abdomen Soft] : soft [Abdomen Tenderness] : non-tender [No CVA Tenderness] : no ~M costovertebral angle tenderness [No Focal Deficits] : no focal deficits [Oriented To Time, Place, And Person] : oriented to person, place, and time [FreeTextEntry1] : faint midline abdominal scar

## 2022-03-29 ENCOUNTER — APPOINTMENT (OUTPATIENT)
Dept: GASTROENTEROLOGY | Facility: CLINIC | Age: 32
End: 2022-03-29

## 2022-03-29 ENCOUNTER — MED ADMIN CHARGE (OUTPATIENT)
Age: 32
End: 2022-03-29

## 2022-03-29 ENCOUNTER — APPOINTMENT (OUTPATIENT)
Dept: GASTROENTEROLOGY | Facility: CLINIC | Age: 32
End: 2022-03-29
Payer: COMMERCIAL

## 2022-03-29 PROCEDURE — 96372 THER/PROPH/DIAG INJ SC/IM: CPT

## 2022-03-29 RX ORDER — CYANOCOBALAMIN 1000 UG/ML
1000 INJECTION INTRAMUSCULAR; SUBCUTANEOUS
Qty: 0 | Refills: 0 | Status: COMPLETED | OUTPATIENT
Start: 2022-03-29

## 2022-03-29 RX ADMIN — CYANOCOBALAMIN 0 MCG/ML: 1000 INJECTION, SOLUTION INTRAMUSCULAR at 00:00

## 2022-04-04 ENCOUNTER — APPOINTMENT (OUTPATIENT)
Age: 32
End: 2022-04-04

## 2022-04-08 ENCOUNTER — OUTPATIENT (OUTPATIENT)
Dept: OUTPATIENT SERVICES | Facility: HOSPITAL | Age: 32
LOS: 1 days | End: 2022-04-08
Payer: COMMERCIAL

## 2022-04-08 ENCOUNTER — APPOINTMENT (OUTPATIENT)
Age: 32
End: 2022-04-08

## 2022-04-08 VITALS
HEART RATE: 65 BPM | OXYGEN SATURATION: 99 % | RESPIRATION RATE: 18 BRPM | TEMPERATURE: 98 F | DIASTOLIC BLOOD PRESSURE: 73 MMHG | SYSTOLIC BLOOD PRESSURE: 109 MMHG

## 2022-04-08 DIAGNOSIS — Z98.890 OTHER SPECIFIED POSTPROCEDURAL STATES: Chronic | ICD-10-CM

## 2022-04-08 DIAGNOSIS — K50.90 CROHN'S DISEASE, UNSPECIFIED, WITHOUT COMPLICATIONS: ICD-10-CM

## 2022-04-08 LAB
ALBUMIN SERPL ELPH-MCNC: 4 G/DL — SIGNIFICANT CHANGE UP (ref 3.3–5)
ALP SERPL-CCNC: 62 U/L — SIGNIFICANT CHANGE UP (ref 40–120)
ALT FLD-CCNC: 16 U/L — SIGNIFICANT CHANGE UP (ref 10–45)
ANION GAP SERPL CALC-SCNC: 9 MMOL/L — SIGNIFICANT CHANGE UP (ref 5–17)
AST SERPL-CCNC: 18 U/L — SIGNIFICANT CHANGE UP (ref 10–40)
BILIRUB SERPL-MCNC: 0.3 MG/DL — SIGNIFICANT CHANGE UP (ref 0.2–1.2)
BUN SERPL-MCNC: 13 MG/DL — SIGNIFICANT CHANGE UP (ref 7–23)
CALCIUM SERPL-MCNC: 9.3 MG/DL — SIGNIFICANT CHANGE UP (ref 8.4–10.5)
CHLORIDE SERPL-SCNC: 107 MMOL/L — SIGNIFICANT CHANGE UP (ref 96–108)
CO2 SERPL-SCNC: 24 MMOL/L — SIGNIFICANT CHANGE UP (ref 22–31)
CREAT SERPL-MCNC: 0.98 MG/DL — SIGNIFICANT CHANGE UP (ref 0.5–1.3)
CRP SERPL-MCNC: <3 MG/L — SIGNIFICANT CHANGE UP (ref 0–4)
EGFR: 104 ML/MIN/1.73M2 — SIGNIFICANT CHANGE UP
GLUCOSE SERPL-MCNC: 101 MG/DL — HIGH (ref 70–99)
HCT VFR BLD CALC: 43.2 % — SIGNIFICANT CHANGE UP (ref 39–50)
HGB BLD-MCNC: 14.8 G/DL — SIGNIFICANT CHANGE UP (ref 13–17)
MCHC RBC-ENTMCNC: 31.1 PG — SIGNIFICANT CHANGE UP (ref 27–34)
MCHC RBC-ENTMCNC: 34.3 GM/DL — SIGNIFICANT CHANGE UP (ref 32–36)
MCV RBC AUTO: 90.8 FL — SIGNIFICANT CHANGE UP (ref 80–100)
NRBC # BLD: 0 /100 WBCS — SIGNIFICANT CHANGE UP (ref 0–0)
PLATELET # BLD AUTO: 218 K/UL — SIGNIFICANT CHANGE UP (ref 150–400)
POTASSIUM SERPL-MCNC: 4.7 MMOL/L — SIGNIFICANT CHANGE UP (ref 3.5–5.3)
POTASSIUM SERPL-SCNC: 4.7 MMOL/L — SIGNIFICANT CHANGE UP (ref 3.5–5.3)
PROT SERPL-MCNC: 6.5 G/DL — SIGNIFICANT CHANGE UP (ref 6–8.3)
RBC # BLD: 4.76 M/UL — SIGNIFICANT CHANGE UP (ref 4.2–5.8)
RBC # FLD: 12.4 % — SIGNIFICANT CHANGE UP (ref 10.3–14.5)
SODIUM SERPL-SCNC: 140 MMOL/L — SIGNIFICANT CHANGE UP (ref 135–145)
WBC # BLD: 6.33 K/UL — SIGNIFICANT CHANGE UP (ref 3.8–10.5)
WBC # FLD AUTO: 6.33 K/UL — SIGNIFICANT CHANGE UP (ref 3.8–10.5)

## 2022-04-08 PROCEDURE — 36415 COLL VENOUS BLD VENIPUNCTURE: CPT

## 2022-04-08 PROCEDURE — 86140 C-REACTIVE PROTEIN: CPT

## 2022-04-08 PROCEDURE — 96413 CHEMO IV INFUSION 1 HR: CPT

## 2022-04-08 PROCEDURE — 80053 COMPREHEN METABOLIC PANEL: CPT

## 2022-04-08 PROCEDURE — 85027 COMPLETE CBC AUTOMATED: CPT

## 2022-04-08 PROCEDURE — 96415 CHEMO IV INFUSION ADDL HR: CPT

## 2022-04-08 RX ORDER — INFLIXIMAB-DYYB 120 MG/ML
800 INJECTION SUBCUTANEOUS ONCE
Refills: 0 | Status: COMPLETED | OUTPATIENT
Start: 2022-04-08 | End: 2022-04-08

## 2022-04-08 RX ADMIN — INFLIXIMAB-DYYB 155 MILLIGRAM(S): 120 INJECTION SUBCUTANEOUS at 08:20

## 2022-04-08 RX ADMIN — INFLIXIMAB-DYYB 800 MILLIGRAM(S): 120 INJECTION SUBCUTANEOUS at 09:50

## 2022-05-11 ENCOUNTER — APPOINTMENT (OUTPATIENT)
Dept: INFUSION THERAPY | Facility: CLINIC | Age: 32
End: 2022-05-11

## 2022-05-11 ENCOUNTER — OUTPATIENT (OUTPATIENT)
Dept: OUTPATIENT SERVICES | Facility: HOSPITAL | Age: 32
LOS: 1 days | End: 2022-05-11
Payer: COMMERCIAL

## 2022-05-11 VITALS
WEIGHT: 169.98 LBS | DIASTOLIC BLOOD PRESSURE: 76 MMHG | HEIGHT: 71 IN | OXYGEN SATURATION: 98 % | SYSTOLIC BLOOD PRESSURE: 124 MMHG | RESPIRATION RATE: 17 BRPM | TEMPERATURE: 98 F | HEART RATE: 74 BPM

## 2022-05-11 DIAGNOSIS — Z98.890 OTHER SPECIFIED POSTPROCEDURAL STATES: Chronic | ICD-10-CM

## 2022-05-11 DIAGNOSIS — K50.90 CROHN'S DISEASE, UNSPECIFIED, WITHOUT COMPLICATIONS: ICD-10-CM

## 2022-05-11 LAB
ALBUMIN SERPL ELPH-MCNC: 4.1 G/DL — SIGNIFICANT CHANGE UP (ref 3.3–5)
ALP SERPL-CCNC: 63 U/L — SIGNIFICANT CHANGE UP (ref 40–120)
ALT FLD-CCNC: 16 U/L — SIGNIFICANT CHANGE UP (ref 10–45)
ANION GAP SERPL CALC-SCNC: 10 MMOL/L — SIGNIFICANT CHANGE UP (ref 5–17)
AST SERPL-CCNC: 18 U/L — SIGNIFICANT CHANGE UP (ref 10–40)
BILIRUB SERPL-MCNC: 0.4 MG/DL — SIGNIFICANT CHANGE UP (ref 0.2–1.2)
BUN SERPL-MCNC: 16 MG/DL — SIGNIFICANT CHANGE UP (ref 7–23)
CALCIUM SERPL-MCNC: 9.4 MG/DL — SIGNIFICANT CHANGE UP (ref 8.4–10.5)
CHLORIDE SERPL-SCNC: 106 MMOL/L — SIGNIFICANT CHANGE UP (ref 96–108)
CO2 SERPL-SCNC: 25 MMOL/L — SIGNIFICANT CHANGE UP (ref 22–31)
CREAT SERPL-MCNC: 1.07 MG/DL — SIGNIFICANT CHANGE UP (ref 0.5–1.3)
CRP SERPL-MCNC: <3 MG/L — SIGNIFICANT CHANGE UP (ref 0–4)
EGFR: 93 ML/MIN/1.73M2 — SIGNIFICANT CHANGE UP
GLUCOSE SERPL-MCNC: 114 MG/DL — HIGH (ref 70–99)
HCT VFR BLD CALC: 42.9 % — SIGNIFICANT CHANGE UP (ref 39–50)
HGB BLD-MCNC: 15.1 G/DL — SIGNIFICANT CHANGE UP (ref 13–17)
MCHC RBC-ENTMCNC: 31.9 PG — SIGNIFICANT CHANGE UP (ref 27–34)
MCHC RBC-ENTMCNC: 35.2 GM/DL — SIGNIFICANT CHANGE UP (ref 32–36)
MCV RBC AUTO: 90.5 FL — SIGNIFICANT CHANGE UP (ref 80–100)
NRBC # BLD: 0 /100 WBCS — SIGNIFICANT CHANGE UP (ref 0–0)
PLATELET # BLD AUTO: 228 K/UL — SIGNIFICANT CHANGE UP (ref 150–400)
POTASSIUM SERPL-MCNC: 4.2 MMOL/L — SIGNIFICANT CHANGE UP (ref 3.5–5.3)
POTASSIUM SERPL-SCNC: 4.2 MMOL/L — SIGNIFICANT CHANGE UP (ref 3.5–5.3)
PROT SERPL-MCNC: 6.7 G/DL — SIGNIFICANT CHANGE UP (ref 6–8.3)
RBC # BLD: 4.74 M/UL — SIGNIFICANT CHANGE UP (ref 4.2–5.8)
RBC # FLD: 12.2 % — SIGNIFICANT CHANGE UP (ref 10.3–14.5)
SODIUM SERPL-SCNC: 141 MMOL/L — SIGNIFICANT CHANGE UP (ref 135–145)
WBC # BLD: 7.1 K/UL — SIGNIFICANT CHANGE UP (ref 3.8–10.5)
WBC # FLD AUTO: 7.1 K/UL — SIGNIFICANT CHANGE UP (ref 3.8–10.5)

## 2022-05-11 PROCEDURE — 85027 COMPLETE CBC AUTOMATED: CPT

## 2022-05-11 PROCEDURE — 96415 CHEMO IV INFUSION ADDL HR: CPT

## 2022-05-11 PROCEDURE — 86140 C-REACTIVE PROTEIN: CPT

## 2022-05-11 PROCEDURE — 80053 COMPREHEN METABOLIC PANEL: CPT

## 2022-05-11 PROCEDURE — 96413 CHEMO IV INFUSION 1 HR: CPT

## 2022-05-11 PROCEDURE — 36415 COLL VENOUS BLD VENIPUNCTURE: CPT

## 2022-05-11 RX ORDER — INFLIXIMAB-DYYB 120 MG/ML
800 INJECTION SUBCUTANEOUS ONCE
Refills: 0 | Status: COMPLETED | OUTPATIENT
Start: 2022-05-11 | End: 2022-05-11

## 2022-05-11 RX ADMIN — INFLIXIMAB-DYYB 155 MILLIGRAM(S): 120 INJECTION SUBCUTANEOUS at 09:02

## 2022-05-11 RX ADMIN — INFLIXIMAB-DYYB 800 MILLIGRAM(S): 120 INJECTION SUBCUTANEOUS at 10:42

## 2022-06-10 ENCOUNTER — OUTPATIENT (OUTPATIENT)
Dept: OUTPATIENT SERVICES | Facility: HOSPITAL | Age: 32
LOS: 1 days | End: 2022-06-10
Payer: COMMERCIAL

## 2022-06-10 ENCOUNTER — APPOINTMENT (OUTPATIENT)
Dept: INFUSION THERAPY | Facility: CLINIC | Age: 32
End: 2022-06-10

## 2022-06-10 VITALS
TEMPERATURE: 98 F | HEART RATE: 59 BPM | DIASTOLIC BLOOD PRESSURE: 82 MMHG | OXYGEN SATURATION: 99 % | RESPIRATION RATE: 18 BRPM | SYSTOLIC BLOOD PRESSURE: 123 MMHG

## 2022-06-10 DIAGNOSIS — K50.90 CROHN'S DISEASE, UNSPECIFIED, WITHOUT COMPLICATIONS: ICD-10-CM

## 2022-06-10 DIAGNOSIS — Z98.890 OTHER SPECIFIED POSTPROCEDURAL STATES: Chronic | ICD-10-CM

## 2022-06-10 LAB
ALBUMIN SERPL ELPH-MCNC: 4.2 G/DL — SIGNIFICANT CHANGE UP (ref 3.3–5)
ALP SERPL-CCNC: 71 U/L — SIGNIFICANT CHANGE UP (ref 40–120)
ALT FLD-CCNC: 19 U/L — SIGNIFICANT CHANGE UP (ref 10–45)
ANION GAP SERPL CALC-SCNC: 12 MMOL/L — SIGNIFICANT CHANGE UP (ref 5–17)
AST SERPL-CCNC: 28 U/L — SIGNIFICANT CHANGE UP (ref 10–40)
BILIRUB SERPL-MCNC: 0.4 MG/DL — SIGNIFICANT CHANGE UP (ref 0.2–1.2)
BUN SERPL-MCNC: 8 MG/DL — SIGNIFICANT CHANGE UP (ref 7–23)
CALCIUM SERPL-MCNC: 9.5 MG/DL — SIGNIFICANT CHANGE UP (ref 8.4–10.5)
CHLORIDE SERPL-SCNC: 105 MMOL/L — SIGNIFICANT CHANGE UP (ref 96–108)
CO2 SERPL-SCNC: 22 MMOL/L — SIGNIFICANT CHANGE UP (ref 22–31)
CREAT SERPL-MCNC: 1.09 MG/DL — SIGNIFICANT CHANGE UP (ref 0.5–1.3)
CRP SERPL-MCNC: <3 MG/L — SIGNIFICANT CHANGE UP (ref 0–4)
EGFR: 91 ML/MIN/1.73M2 — SIGNIFICANT CHANGE UP
GLUCOSE SERPL-MCNC: 99 MG/DL — SIGNIFICANT CHANGE UP (ref 70–99)
HCT VFR BLD CALC: 44.6 % — SIGNIFICANT CHANGE UP (ref 39–50)
HGB BLD-MCNC: 15 G/DL — SIGNIFICANT CHANGE UP (ref 13–17)
MCHC RBC-ENTMCNC: 30.1 PG — SIGNIFICANT CHANGE UP (ref 27–34)
MCHC RBC-ENTMCNC: 33.6 GM/DL — SIGNIFICANT CHANGE UP (ref 32–36)
MCV RBC AUTO: 89.6 FL — SIGNIFICANT CHANGE UP (ref 80–100)
NRBC # BLD: 0 /100 WBCS — SIGNIFICANT CHANGE UP (ref 0–0)
PLATELET # BLD AUTO: 245 K/UL — SIGNIFICANT CHANGE UP (ref 150–400)
POTASSIUM SERPL-MCNC: 4.8 MMOL/L — SIGNIFICANT CHANGE UP (ref 3.5–5.3)
POTASSIUM SERPL-SCNC: 4.8 MMOL/L — SIGNIFICANT CHANGE UP (ref 3.5–5.3)
PROT SERPL-MCNC: 7 G/DL — SIGNIFICANT CHANGE UP (ref 6–8.3)
RBC # BLD: 4.98 M/UL — SIGNIFICANT CHANGE UP (ref 4.2–5.8)
RBC # FLD: 12.7 % — SIGNIFICANT CHANGE UP (ref 10.3–14.5)
SODIUM SERPL-SCNC: 139 MMOL/L — SIGNIFICANT CHANGE UP (ref 135–145)
WBC # BLD: 5.95 K/UL — SIGNIFICANT CHANGE UP (ref 3.8–10.5)
WBC # FLD AUTO: 5.95 K/UL — SIGNIFICANT CHANGE UP (ref 3.8–10.5)

## 2022-06-10 PROCEDURE — 85027 COMPLETE CBC AUTOMATED: CPT

## 2022-06-10 PROCEDURE — 80053 COMPREHEN METABOLIC PANEL: CPT

## 2022-06-10 PROCEDURE — 82542 COL CHROMOTOGRAPHY QUAL/QUAN: CPT

## 2022-06-10 PROCEDURE — 96415 CHEMO IV INFUSION ADDL HR: CPT

## 2022-06-10 PROCEDURE — 96413 CHEMO IV INFUSION 1 HR: CPT

## 2022-06-10 PROCEDURE — 86140 C-REACTIVE PROTEIN: CPT

## 2022-06-10 PROCEDURE — 36415 COLL VENOUS BLD VENIPUNCTURE: CPT

## 2022-06-10 PROCEDURE — 80230 DRUG ASSAY INFLIXIMAB: CPT

## 2022-06-10 RX ORDER — INFLIXIMAB-DYYB 120 MG/ML
800 INJECTION SUBCUTANEOUS ONCE
Refills: 0 | Status: COMPLETED | OUTPATIENT
Start: 2022-06-10 | End: 2022-06-10

## 2022-06-10 RX ADMIN — INFLIXIMAB-DYYB 155 MILLIGRAM(S): 120 INJECTION SUBCUTANEOUS at 11:20

## 2022-06-10 RX ADMIN — INFLIXIMAB-DYYB 800 MILLIGRAM(S): 120 INJECTION SUBCUTANEOUS at 13:50

## 2022-07-08 ENCOUNTER — OUTPATIENT (OUTPATIENT)
Dept: OUTPATIENT SERVICES | Facility: HOSPITAL | Age: 32
LOS: 1 days | End: 2022-07-08
Payer: COMMERCIAL

## 2022-07-08 ENCOUNTER — APPOINTMENT (OUTPATIENT)
Dept: INFUSION THERAPY | Facility: CLINIC | Age: 32
End: 2022-07-08

## 2022-07-08 VITALS
DIASTOLIC BLOOD PRESSURE: 81 MMHG | SYSTOLIC BLOOD PRESSURE: 127 MMHG | OXYGEN SATURATION: 98 % | TEMPERATURE: 98 F | HEART RATE: 66 BPM | WEIGHT: 169.98 LBS | HEIGHT: 71 IN | RESPIRATION RATE: 20 BRPM

## 2022-07-08 DIAGNOSIS — Z98.890 OTHER SPECIFIED POSTPROCEDURAL STATES: Chronic | ICD-10-CM

## 2022-07-08 DIAGNOSIS — K50.90 CROHN'S DISEASE, UNSPECIFIED, WITHOUT COMPLICATIONS: ICD-10-CM

## 2022-07-08 LAB
ALBUMIN SERPL ELPH-MCNC: 3.5 G/DL — SIGNIFICANT CHANGE UP (ref 3.3–5)
ALP SERPL-CCNC: 70 U/L — SIGNIFICANT CHANGE UP (ref 40–120)
ALT FLD-CCNC: 22 U/L — SIGNIFICANT CHANGE UP (ref 10–45)
ANION GAP SERPL CALC-SCNC: 9 MMOL/L — SIGNIFICANT CHANGE UP (ref 5–17)
AST SERPL-CCNC: 31 U/L — SIGNIFICANT CHANGE UP (ref 10–40)
BILIRUB SERPL-MCNC: 0.6 MG/DL — SIGNIFICANT CHANGE UP (ref 0.2–1.2)
BUN SERPL-MCNC: 11 MG/DL — SIGNIFICANT CHANGE UP (ref 7–23)
CALCIUM SERPL-MCNC: 10 MG/DL — SIGNIFICANT CHANGE UP (ref 8.4–10.5)
CHLORIDE SERPL-SCNC: 105 MMOL/L — SIGNIFICANT CHANGE UP (ref 96–108)
CO2 SERPL-SCNC: 25 MMOL/L — SIGNIFICANT CHANGE UP (ref 22–31)
CREAT SERPL-MCNC: 1.1 MG/DL — SIGNIFICANT CHANGE UP (ref 0.5–1.3)
CRP SERPL-MCNC: <3 MG/L — SIGNIFICANT CHANGE UP (ref 0–4)
EGFR: 90 ML/MIN/1.73M2 — SIGNIFICANT CHANGE UP
GLUCOSE SERPL-MCNC: 97 MG/DL — SIGNIFICANT CHANGE UP (ref 70–99)
HCT VFR BLD CALC: 44.5 % — SIGNIFICANT CHANGE UP (ref 39–50)
HGB BLD-MCNC: 15 G/DL — SIGNIFICANT CHANGE UP (ref 13–17)
MCHC RBC-ENTMCNC: 29.2 PG — SIGNIFICANT CHANGE UP (ref 27–34)
MCHC RBC-ENTMCNC: 33.7 GM/DL — SIGNIFICANT CHANGE UP (ref 32–36)
MCV RBC AUTO: 86.7 FL — SIGNIFICANT CHANGE UP (ref 80–100)
PLATELET # BLD AUTO: 243 K/UL — SIGNIFICANT CHANGE UP (ref 150–400)
POTASSIUM SERPL-MCNC: 4.6 MMOL/L — SIGNIFICANT CHANGE UP (ref 3.5–5.3)
POTASSIUM SERPL-SCNC: 4.6 MMOL/L — SIGNIFICANT CHANGE UP (ref 3.5–5.3)
PROT SERPL-MCNC: 6.9 G/DL — SIGNIFICANT CHANGE UP (ref 6–8.3)
RBC # BLD: 5.13 M/UL — SIGNIFICANT CHANGE UP (ref 4.2–5.8)
RBC # FLD: 13.4 % — SIGNIFICANT CHANGE UP (ref 10.3–14.5)
SODIUM SERPL-SCNC: 139 MMOL/L — SIGNIFICANT CHANGE UP (ref 135–145)
WBC # BLD: 7.6 K/UL — SIGNIFICANT CHANGE UP (ref 3.8–10.5)
WBC # FLD AUTO: 7.6 K/UL — SIGNIFICANT CHANGE UP (ref 3.8–10.5)

## 2022-07-08 PROCEDURE — 36415 COLL VENOUS BLD VENIPUNCTURE: CPT

## 2022-07-08 PROCEDURE — 85027 COMPLETE CBC AUTOMATED: CPT

## 2022-07-08 PROCEDURE — 82397 CHEMILUMINESCENT ASSAY: CPT

## 2022-07-08 PROCEDURE — 82542 COL CHROMOTOGRAPHY QUAL/QUAN: CPT

## 2022-07-08 PROCEDURE — 96413 CHEMO IV INFUSION 1 HR: CPT

## 2022-07-08 PROCEDURE — 80053 COMPREHEN METABOLIC PANEL: CPT

## 2022-07-08 PROCEDURE — 80230 DRUG ASSAY INFLIXIMAB: CPT

## 2022-07-08 PROCEDURE — 86140 C-REACTIVE PROTEIN: CPT

## 2022-07-08 RX ORDER — INFLIXIMAB-DYYB 120 MG/ML
800 INJECTION SUBCUTANEOUS ONCE
Refills: 0 | Status: COMPLETED | OUTPATIENT
Start: 2022-07-08 | End: 2022-07-08

## 2022-07-08 RX ADMIN — INFLIXIMAB-DYYB 800 MILLIGRAM(S): 120 INJECTION SUBCUTANEOUS at 14:05

## 2022-07-08 RX ADMIN — INFLIXIMAB-DYYB 165 MILLIGRAM(S): 120 INJECTION SUBCUTANEOUS at 12:59

## 2022-07-15 LAB
HSCRP: 0.5 MG/L — SIGNIFICANT CHANGE UP
Lab: 1.6 NG/ML — SIGNIFICANT CHANGE UP
Lab: 156 NG/ML — SIGNIFICANT CHANGE UP
Lab: 24 NG/ML — SIGNIFICANT CHANGE UP
Lab: 246 NG/ML — SIGNIFICANT CHANGE UP
Lab: 25 NG/ML — SIGNIFICANT CHANGE UP
Lab: 3.3 NG/ML — SIGNIFICANT CHANGE UP
Lab: 358 NG/ML — SIGNIFICANT CHANGE UP
Lab: 36 — SIGNIFICANT CHANGE UP
Lab: 5.8 PG/ML — SIGNIFICANT CHANGE UP
Lab: 6.6 PG/ML — SIGNIFICANT CHANGE UP
Lab: 823 NG/ML — SIGNIFICANT CHANGE UP
Lab: 9 NG/ML — SIGNIFICANT CHANGE UP
Lab: SIGNIFICANT CHANGE UP
PROMETHEUS LABORATORY FOOTER: SIGNIFICANT CHANGE UP
SAA PROMETHEUS: 2.6 MG/L — SIGNIFICANT CHANGE UP

## 2022-07-21 ENCOUNTER — APPOINTMENT (OUTPATIENT)
Dept: GASTROENTEROLOGY | Facility: CLINIC | Age: 32
End: 2022-07-21

## 2022-07-21 PROCEDURE — 99214 OFFICE O/P EST MOD 30 MIN: CPT | Mod: 95

## 2022-07-24 NOTE — HISTORY OF PRESENT ILLNESS
[Home] : at home, [unfilled] , at the time of the visit. [Medical Office: (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 Pt reports that he was doing well until early July when he had an episode of 2 BM with BRBPR which resolved spontaneously. Since that time hes been having intermittent abdominal pain but has been having 1-2 formed BM daily (on cholestyramine) with no blood or mucous. Denies urgency or rectal pain. Denies any travel or unusual food at time of bleeding. Last colonoscopy as below with healing anastomotic ulcer.\par \par \par Previous Hx\par Last colon 12/2020 - Crohns disease in remission and nearly resolved anastomotic ulcer. \par \par IFX q4 weeks \par last infusion 03/07/2022\par CRP <3\par IFX levels - 27.5 with no antibodies\par \par Previous history \par June 2019, patient's IFX dose was increased to 10mg/kg from 5mg/kg. After 2 sessions of the increased dose, he underwent a colonoscopy 9/5/19 to see whether there was any improvement in the ulcerations with higher dose of infliximab. Colonoscopy showed i1 disease, and anastomotic ulcer was still present friable with spontaneous oozing, seemed to have extended since prior colonoscopy. At that time discussed the possibility of a hyperbaric chamber with high dose oxygen as treatment for the ulcer, which has been tried for other indications. He was unable to find a place that accepted his insurance. \par \par Colonoscopy 9/5/19: side to side anastomosis, Leonard-TI with 2 aphthous ulcers, Ruutgers score i1, anastomotic ulcer friable with spontaneous oozing. Seemed to have extended since prior colon despite increased IFX. Ulcer not likely inflammatory in nature. \par Pathology: unremarkable\par \par EIMs: No\par \par CBC, CMP unremarkable from previous infusion \par -------------------------------------------------------------------------------------------------------------------\par PRIOR Hx - \par 28 Y M, hx of prior small bowel resection - first for ileo-cecal intussusception as an infant in 1990, and then distal SBR in 2013 for GI bleed; presents today s/p hospitalization and emergent weekend endoscopy for GI bleed and new diagnosis of Crohn's Disease. bx apparently from ileocolonic anastomosis, but VCE showed numerous diffuse sb aphthae.\par \par The patient had sx from his teen years, but moved around and didn't seek care during periods of wellness and also didn't have insurance coverage for a portion of the time. Before moving to [place], he had several bleeding episodes, one which req. laparoscopic evaluation and resection of small bowel (in [place]) with unclear pathology. NO one has told him he had any evidence of Crohn's in past. \par \par Pt was hospitalized in February and in April for melanic stools and anemia. He was transfused both admissions. He also had EGD/Colonoscopy and VCE (no reports available) that revealed multiple ulcers at prior ileo-cecal anastomosis w/biopsies that showed active chronic enteritis. \par EGD unrevealing.\par \par Pt was admitted to [facility] from 7/20-7/27/18 for recurrent LGIB 2/2 ileocolic anastomotic ulcer. He underwent a laparoscopic ileocolic resection with side-side anastomosis on 7/23/17. \par Since then, he has received two more loading doses of IFX. \par \par \par Prior biopsies of small bowel reveal active chronic enteritis with cryptitis and crypt architecture distortion. VCE revealed multiple ulcers in the jejunum and terminal ileum. \par Review of op report from [place] 2013 for GIB from anastamotic ulcer:\par 1. ileocectomy with Resection of previous ileal-ileal anastamosis\par 2. Mid ileum resection\par 3. Resection of previous jejunum to jeunum anastamosis; ?which had been bypassed; resected in setting of GIB. \par  \par Fam hx: no IBD or CRC\par Social hx: tobacco user 5x/week, alcohol use 5 drinks/week, marijuana use on occasion not weekly, NSAID use once/two weeks for HAs.

## 2022-07-24 NOTE — ASSESSMENT
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 early July, no bleeding since then but not feeling fully well and having abdominal pain\par - bleeding episode was few days prior to next IFX infusion but reports that this has not been an ongoing pattern of symptoms prior to next infusion\par - Continue with IFX 10 mg/kg q4 weeks, drug levels remain therapeutic with no ab\par - Continue to monitor drug levels\par - will plan for colonoscopy for eval of anastomotic ulcer; source of hematochezia\par \par \par # Mild anemia- improved s/p Fe infusion\par - 7/2022 CBC drawn after bleeding episode was 15\par - continue PO B12 and Vitamin D supplements \par \par \par # Bile acid diarrhea\par - c/w cholestyramine PRN, titrate to 1-2 BMs daily (asked him to consider taking with his largest meals)\par \par #HSV \par - valacyclovir 1GM PRN due to recurrent HSV mouth sores \par \par #HCM\par - seldom smoking Cigarettes, (5 cigarettes since 1/2022)\par - emphasized importance of NSAID avoidance, denies use currently \par - denies depression - sees therapist regularly \par - never takes Flu vaccine \par - S/p COVID vaccine and booster\par - emphasized importance of tobacco, cocaine, and alcohol avoidance \par - immune to Hep B, consider checking Hep A immunity with next infusion\par - TB negative 2018 \par - UTD derm FSBE; last seen in 7/2020; states he will schedule f/u appt\par \par \par F/u post procedure\par \par High MDM due to chronic iIlness that poses threat to bodily function, personal review of labs, and ordering of f/u, and drug therapy requiring intensive monitoring.\par

## 2022-08-08 ENCOUNTER — APPOINTMENT (OUTPATIENT)
Dept: INFUSION THERAPY | Facility: CLINIC | Age: 32
End: 2022-08-08

## 2022-08-08 ENCOUNTER — OUTPATIENT (OUTPATIENT)
Dept: OUTPATIENT SERVICES | Facility: HOSPITAL | Age: 32
LOS: 1 days | End: 2022-08-08
Payer: COMMERCIAL

## 2022-08-08 VITALS
WEIGHT: 169.98 LBS | DIASTOLIC BLOOD PRESSURE: 74 MMHG | SYSTOLIC BLOOD PRESSURE: 110 MMHG | RESPIRATION RATE: 18 BRPM | HEART RATE: 68 BPM | HEIGHT: 71 IN | TEMPERATURE: 98 F | OXYGEN SATURATION: 98 %

## 2022-08-08 DIAGNOSIS — Z98.890 OTHER SPECIFIED POSTPROCEDURAL STATES: Chronic | ICD-10-CM

## 2022-08-08 DIAGNOSIS — K50.00 CROHN'S DISEASE OF SMALL INTESTINE WITHOUT COMPLICATIONS: ICD-10-CM

## 2022-08-08 LAB
ALBUMIN SERPL ELPH-MCNC: 4 G/DL — SIGNIFICANT CHANGE UP (ref 3.3–5)
ALP SERPL-CCNC: 62 U/L — SIGNIFICANT CHANGE UP (ref 40–120)
ALT FLD-CCNC: 15 U/L — SIGNIFICANT CHANGE UP (ref 10–45)
ANION GAP SERPL CALC-SCNC: 12 MMOL/L — SIGNIFICANT CHANGE UP (ref 5–17)
AST SERPL-CCNC: 22 U/L — SIGNIFICANT CHANGE UP (ref 10–40)
BILIRUB SERPL-MCNC: 0.3 MG/DL — SIGNIFICANT CHANGE UP (ref 0.2–1.2)
BUN SERPL-MCNC: 11 MG/DL — SIGNIFICANT CHANGE UP (ref 7–23)
CALCIUM SERPL-MCNC: 9.5 MG/DL — SIGNIFICANT CHANGE UP (ref 8.4–10.5)
CHLORIDE SERPL-SCNC: 104 MMOL/L — SIGNIFICANT CHANGE UP (ref 96–108)
CO2 SERPL-SCNC: 24 MMOL/L — SIGNIFICANT CHANGE UP (ref 22–31)
CREAT SERPL-MCNC: 0.98 MG/DL — SIGNIFICANT CHANGE UP (ref 0.5–1.3)
CRP SERPL-MCNC: <3 MG/L — SIGNIFICANT CHANGE UP (ref 0–4)
EGFR: 104 ML/MIN/1.73M2 — SIGNIFICANT CHANGE UP
GLUCOSE SERPL-MCNC: 100 MG/DL — HIGH (ref 70–99)
HCT VFR BLD CALC: 42.8 % — SIGNIFICANT CHANGE UP (ref 39–50)
HGB BLD-MCNC: 14.5 G/DL — SIGNIFICANT CHANGE UP (ref 13–17)
MCHC RBC-ENTMCNC: 29.8 PG — SIGNIFICANT CHANGE UP (ref 27–34)
MCHC RBC-ENTMCNC: 33.9 GM/DL — SIGNIFICANT CHANGE UP (ref 32–36)
MCV RBC AUTO: 87.9 FL — SIGNIFICANT CHANGE UP (ref 80–100)
NRBC # BLD: 0 /100 WBCS — SIGNIFICANT CHANGE UP (ref 0–0)
PLATELET # BLD AUTO: 229 K/UL — SIGNIFICANT CHANGE UP (ref 150–400)
POTASSIUM SERPL-MCNC: 4.1 MMOL/L — SIGNIFICANT CHANGE UP (ref 3.5–5.3)
POTASSIUM SERPL-SCNC: 4.1 MMOL/L — SIGNIFICANT CHANGE UP (ref 3.5–5.3)
PROT SERPL-MCNC: 6.7 G/DL — SIGNIFICANT CHANGE UP (ref 6–8.3)
RBC # BLD: 4.87 M/UL — SIGNIFICANT CHANGE UP (ref 4.2–5.8)
RBC # FLD: 12.8 % — SIGNIFICANT CHANGE UP (ref 10.3–14.5)
SODIUM SERPL-SCNC: 140 MMOL/L — SIGNIFICANT CHANGE UP (ref 135–145)
WBC # BLD: 6.58 K/UL — SIGNIFICANT CHANGE UP (ref 3.8–10.5)
WBC # FLD AUTO: 6.58 K/UL — SIGNIFICANT CHANGE UP (ref 3.8–10.5)

## 2022-08-08 PROCEDURE — 96415 CHEMO IV INFUSION ADDL HR: CPT

## 2022-08-08 PROCEDURE — 96413 CHEMO IV INFUSION 1 HR: CPT

## 2022-08-08 PROCEDURE — 36415 COLL VENOUS BLD VENIPUNCTURE: CPT

## 2022-08-08 PROCEDURE — 82542 COL CHROMOTOGRAPHY QUAL/QUAN: CPT

## 2022-08-08 PROCEDURE — 80230 DRUG ASSAY INFLIXIMAB: CPT

## 2022-08-08 PROCEDURE — 85027 COMPLETE CBC AUTOMATED: CPT

## 2022-08-08 PROCEDURE — 80053 COMPREHEN METABOLIC PANEL: CPT

## 2022-08-08 PROCEDURE — 86140 C-REACTIVE PROTEIN: CPT

## 2022-08-08 RX ORDER — INFLIXIMAB-DYYB 120 MG/ML
800 INJECTION SUBCUTANEOUS ONCE
Refills: 0 | Status: COMPLETED | OUTPATIENT
Start: 2022-08-08 | End: 2022-08-08

## 2022-08-08 RX ADMIN — INFLIXIMAB-DYYB 155 MILLIGRAM(S): 120 INJECTION SUBCUTANEOUS at 15:10

## 2022-08-08 RX ADMIN — INFLIXIMAB-DYYB 800 MILLIGRAM(S): 120 INJECTION SUBCUTANEOUS at 16:45

## 2022-08-11 LAB
ANTIBODIES TO INFLIXIMAB (ATI) CONCENTRATION: < 3.1 U/ML — SIGNIFICANT CHANGE UP
PROMETHEUS ANSER IFX RESULT: SIGNIFICANT CHANGE UP
PROMETHEUS LABORATORY FOOTER: SIGNIFICANT CHANGE UP
SERUM INFLIXIMAB (IFX) CONCENTRATION: 29.4 UG/ML — SIGNIFICANT CHANGE UP

## 2022-09-12 ENCOUNTER — OUTPATIENT (OUTPATIENT)
Dept: OUTPATIENT SERVICES | Facility: HOSPITAL | Age: 32
LOS: 1 days | End: 2022-09-12
Payer: COMMERCIAL

## 2022-09-12 ENCOUNTER — APPOINTMENT (OUTPATIENT)
Dept: INFUSION THERAPY | Facility: CLINIC | Age: 32
End: 2022-09-12

## 2022-09-12 VITALS
RESPIRATION RATE: 17 BRPM | SYSTOLIC BLOOD PRESSURE: 132 MMHG | DIASTOLIC BLOOD PRESSURE: 76 MMHG | OXYGEN SATURATION: 99 % | HEIGHT: 71 IN | HEART RATE: 66 BPM | TEMPERATURE: 98 F | WEIGHT: 169.98 LBS

## 2022-09-12 DIAGNOSIS — K50.90 CROHN'S DISEASE, UNSPECIFIED, WITHOUT COMPLICATIONS: ICD-10-CM

## 2022-09-12 DIAGNOSIS — Z98.890 OTHER SPECIFIED POSTPROCEDURAL STATES: Chronic | ICD-10-CM

## 2022-09-12 LAB
ALBUMIN SERPL ELPH-MCNC: 4.3 G/DL — SIGNIFICANT CHANGE UP (ref 3.3–5)
ALP SERPL-CCNC: 63 U/L — SIGNIFICANT CHANGE UP (ref 40–120)
ALT FLD-CCNC: 27 U/L — SIGNIFICANT CHANGE UP (ref 10–45)
ANION GAP SERPL CALC-SCNC: 13 MMOL/L — SIGNIFICANT CHANGE UP (ref 5–17)
AST SERPL-CCNC: 27 U/L — SIGNIFICANT CHANGE UP (ref 10–40)
BILIRUB SERPL-MCNC: 0.4 MG/DL — SIGNIFICANT CHANGE UP (ref 0.2–1.2)
BUN SERPL-MCNC: 12 MG/DL — SIGNIFICANT CHANGE UP (ref 7–23)
CALCIUM SERPL-MCNC: 9.3 MG/DL — SIGNIFICANT CHANGE UP (ref 8.4–10.5)
CHLORIDE SERPL-SCNC: 103 MMOL/L — SIGNIFICANT CHANGE UP (ref 96–108)
CO2 SERPL-SCNC: 21 MMOL/L — LOW (ref 22–31)
CREAT SERPL-MCNC: 0.94 MG/DL — SIGNIFICANT CHANGE UP (ref 0.5–1.3)
CRP SERPL-MCNC: <3 MG/L — SIGNIFICANT CHANGE UP (ref 0–4)
EGFR: 109 ML/MIN/1.73M2 — SIGNIFICANT CHANGE UP
GLUCOSE SERPL-MCNC: 95 MG/DL — SIGNIFICANT CHANGE UP (ref 70–99)
HCT VFR BLD CALC: 42.4 % — SIGNIFICANT CHANGE UP (ref 39–50)
HGB BLD-MCNC: 14.9 G/DL — SIGNIFICANT CHANGE UP (ref 13–17)
MCHC RBC-ENTMCNC: 31 PG — SIGNIFICANT CHANGE UP (ref 27–34)
MCHC RBC-ENTMCNC: 35.1 GM/DL — SIGNIFICANT CHANGE UP (ref 32–36)
MCV RBC AUTO: 88.1 FL — SIGNIFICANT CHANGE UP (ref 80–100)
NRBC # BLD: 0 /100 WBCS — SIGNIFICANT CHANGE UP (ref 0–0)
PLATELET # BLD AUTO: 259 K/UL — SIGNIFICANT CHANGE UP (ref 150–400)
POTASSIUM SERPL-MCNC: 3.9 MMOL/L — SIGNIFICANT CHANGE UP (ref 3.5–5.3)
POTASSIUM SERPL-SCNC: 3.9 MMOL/L — SIGNIFICANT CHANGE UP (ref 3.5–5.3)
PROT SERPL-MCNC: 7 G/DL — SIGNIFICANT CHANGE UP (ref 6–8.3)
RBC # BLD: 4.81 M/UL — SIGNIFICANT CHANGE UP (ref 4.2–5.8)
RBC # FLD: 13.1 % — SIGNIFICANT CHANGE UP (ref 10.3–14.5)
SODIUM SERPL-SCNC: 137 MMOL/L — SIGNIFICANT CHANGE UP (ref 135–145)
WBC # BLD: 7.41 K/UL — SIGNIFICANT CHANGE UP (ref 3.8–10.5)
WBC # FLD AUTO: 7.41 K/UL — SIGNIFICANT CHANGE UP (ref 3.8–10.5)

## 2022-09-12 PROCEDURE — 80230 DRUG ASSAY INFLIXIMAB: CPT

## 2022-09-12 PROCEDURE — 82397 CHEMILUMINESCENT ASSAY: CPT

## 2022-09-12 PROCEDURE — 96413 CHEMO IV INFUSION 1 HR: CPT

## 2022-09-12 PROCEDURE — 86140 C-REACTIVE PROTEIN: CPT

## 2022-09-12 PROCEDURE — 36415 COLL VENOUS BLD VENIPUNCTURE: CPT

## 2022-09-12 PROCEDURE — 85027 COMPLETE CBC AUTOMATED: CPT

## 2022-09-12 PROCEDURE — 96415 CHEMO IV INFUSION ADDL HR: CPT

## 2022-09-12 PROCEDURE — 80053 COMPREHEN METABOLIC PANEL: CPT

## 2022-09-12 RX ORDER — INFLIXIMAB-DYYB 120 MG/ML
800 INJECTION SUBCUTANEOUS ONCE
Refills: 0 | Status: COMPLETED | OUTPATIENT
Start: 2022-09-12 | End: 2022-09-12

## 2022-09-12 RX ADMIN — INFLIXIMAB-DYYB 800 MILLIGRAM(S): 120 INJECTION SUBCUTANEOUS at 16:45

## 2022-09-12 RX ADMIN — INFLIXIMAB-DYYB 155 MILLIGRAM(S): 120 INJECTION SUBCUTANEOUS at 15:03

## 2022-09-22 LAB
INFLIXIMAB AB SERPL-MCNC: <22 NG/ML — SIGNIFICANT CHANGE UP
INFLIXIMAB SERPL-MCNC: 17 UG/ML — SIGNIFICANT CHANGE UP

## 2022-10-12 ENCOUNTER — APPOINTMENT (OUTPATIENT)
Dept: INFUSION THERAPY | Facility: CLINIC | Age: 32
End: 2022-10-12

## 2022-10-12 ENCOUNTER — OUTPATIENT (OUTPATIENT)
Dept: OUTPATIENT SERVICES | Facility: HOSPITAL | Age: 32
LOS: 1 days | End: 2022-10-12
Payer: COMMERCIAL

## 2022-10-12 VITALS
HEIGHT: 71 IN | RESPIRATION RATE: 17 BRPM | HEART RATE: 61 BPM | OXYGEN SATURATION: 98 % | SYSTOLIC BLOOD PRESSURE: 126 MMHG | TEMPERATURE: 98 F | DIASTOLIC BLOOD PRESSURE: 75 MMHG | WEIGHT: 169.98 LBS

## 2022-10-12 DIAGNOSIS — Z98.890 OTHER SPECIFIED POSTPROCEDURAL STATES: Chronic | ICD-10-CM

## 2022-10-12 DIAGNOSIS — K50.90 CROHN'S DISEASE, UNSPECIFIED, WITHOUT COMPLICATIONS: ICD-10-CM

## 2022-10-12 LAB
ALBUMIN SERPL ELPH-MCNC: 4.3 G/DL — SIGNIFICANT CHANGE UP (ref 3.3–5)
ALP SERPL-CCNC: 58 U/L — SIGNIFICANT CHANGE UP (ref 40–120)
ALT FLD-CCNC: 25 U/L — SIGNIFICANT CHANGE UP (ref 10–45)
ANION GAP SERPL CALC-SCNC: 11 MMOL/L — SIGNIFICANT CHANGE UP (ref 5–17)
AST SERPL-CCNC: 29 U/L — SIGNIFICANT CHANGE UP (ref 10–40)
BILIRUB SERPL-MCNC: 0.3 MG/DL — SIGNIFICANT CHANGE UP (ref 0.2–1.2)
BUN SERPL-MCNC: 13 MG/DL — SIGNIFICANT CHANGE UP (ref 7–23)
CALCIUM SERPL-MCNC: 9 MG/DL — SIGNIFICANT CHANGE UP (ref 8.4–10.5)
CHLORIDE SERPL-SCNC: 102 MMOL/L — SIGNIFICANT CHANGE UP (ref 96–108)
CO2 SERPL-SCNC: 24 MMOL/L — SIGNIFICANT CHANGE UP (ref 22–31)
CREAT SERPL-MCNC: 1.31 MG/DL — HIGH (ref 0.5–1.3)
CRP SERPL-MCNC: <3 MG/L — SIGNIFICANT CHANGE UP (ref 0–4)
EGFR: 73 ML/MIN/1.73M2 — SIGNIFICANT CHANGE UP
GLUCOSE SERPL-MCNC: 97 MG/DL — SIGNIFICANT CHANGE UP (ref 70–99)
HCT VFR BLD CALC: 42.8 % — SIGNIFICANT CHANGE UP (ref 39–50)
HGB BLD-MCNC: 15 G/DL — SIGNIFICANT CHANGE UP (ref 13–17)
MCHC RBC-ENTMCNC: 31 PG — SIGNIFICANT CHANGE UP (ref 27–34)
MCHC RBC-ENTMCNC: 35 GM/DL — SIGNIFICANT CHANGE UP (ref 32–36)
MCV RBC AUTO: 88.4 FL — SIGNIFICANT CHANGE UP (ref 80–100)
NRBC # BLD: 0 /100 WBCS — SIGNIFICANT CHANGE UP (ref 0–0)
PLATELET # BLD AUTO: 245 K/UL — SIGNIFICANT CHANGE UP (ref 150–400)
POTASSIUM SERPL-MCNC: 4.3 MMOL/L — SIGNIFICANT CHANGE UP (ref 3.5–5.3)
POTASSIUM SERPL-SCNC: 4.3 MMOL/L — SIGNIFICANT CHANGE UP (ref 3.5–5.3)
PROT SERPL-MCNC: 6.8 G/DL — SIGNIFICANT CHANGE UP (ref 6–8.3)
RBC # BLD: 4.84 M/UL — SIGNIFICANT CHANGE UP (ref 4.2–5.8)
RBC # FLD: 13.1 % — SIGNIFICANT CHANGE UP (ref 10.3–14.5)
SODIUM SERPL-SCNC: 137 MMOL/L — SIGNIFICANT CHANGE UP (ref 135–145)
WBC # BLD: 7.58 K/UL — SIGNIFICANT CHANGE UP (ref 3.8–10.5)
WBC # FLD AUTO: 7.58 K/UL — SIGNIFICANT CHANGE UP (ref 3.8–10.5)

## 2022-10-12 PROCEDURE — 86140 C-REACTIVE PROTEIN: CPT

## 2022-10-12 PROCEDURE — 80230 DRUG ASSAY INFLIXIMAB: CPT

## 2022-10-12 PROCEDURE — 82542 COL CHROMOTOGRAPHY QUAL/QUAN: CPT

## 2022-10-12 PROCEDURE — 36415 COLL VENOUS BLD VENIPUNCTURE: CPT

## 2022-10-12 PROCEDURE — 96415 CHEMO IV INFUSION ADDL HR: CPT

## 2022-10-12 PROCEDURE — 80053 COMPREHEN METABOLIC PANEL: CPT

## 2022-10-12 PROCEDURE — 96413 CHEMO IV INFUSION 1 HR: CPT

## 2022-10-12 PROCEDURE — 85027 COMPLETE CBC AUTOMATED: CPT

## 2022-10-12 RX ORDER — INFLIXIMAB-DYYB 120 MG/ML
800 INJECTION SUBCUTANEOUS ONCE
Refills: 0 | Status: COMPLETED | OUTPATIENT
Start: 2022-10-12 | End: 2022-10-12

## 2022-10-12 RX ADMIN — INFLIXIMAB-DYYB 155 MILLIGRAM(S): 120 INJECTION SUBCUTANEOUS at 15:17

## 2022-10-12 RX ADMIN — INFLIXIMAB-DYYB 800 MILLIGRAM(S): 120 INJECTION SUBCUTANEOUS at 17:05

## 2022-10-25 LAB
ANTIBODIES TO INFLIXIMAB (ATI) CONCENTRATION: < 3.1 U/ML — SIGNIFICANT CHANGE UP
PROMETHEUS ANSER IFX RESULT: SIGNIFICANT CHANGE UP
PROMETHEUS LABORATORY FOOTER: SIGNIFICANT CHANGE UP
SERUM INFLIXIMAB (IFX) CONCENTRATION: 28.8 UG/ML — SIGNIFICANT CHANGE UP

## 2022-11-09 ENCOUNTER — APPOINTMENT (OUTPATIENT)
Dept: INFUSION THERAPY | Facility: CLINIC | Age: 32
End: 2022-11-09

## 2022-11-09 ENCOUNTER — OUTPATIENT (OUTPATIENT)
Dept: OUTPATIENT SERVICES | Facility: HOSPITAL | Age: 32
LOS: 1 days | End: 2022-11-09
Payer: COMMERCIAL

## 2022-11-09 VITALS
DIASTOLIC BLOOD PRESSURE: 78 MMHG | HEART RATE: 72 BPM | SYSTOLIC BLOOD PRESSURE: 118 MMHG | RESPIRATION RATE: 17 BRPM | TEMPERATURE: 98 F | OXYGEN SATURATION: 98 %

## 2022-11-09 VITALS
OXYGEN SATURATION: 99 % | RESPIRATION RATE: 17 BRPM | SYSTOLIC BLOOD PRESSURE: 131 MMHG | WEIGHT: 169.98 LBS | TEMPERATURE: 98 F | HEIGHT: 71 IN | DIASTOLIC BLOOD PRESSURE: 67 MMHG | HEART RATE: 66 BPM

## 2022-11-09 DIAGNOSIS — K50.90 CROHN'S DISEASE, UNSPECIFIED, WITHOUT COMPLICATIONS: ICD-10-CM

## 2022-11-09 DIAGNOSIS — Z98.890 OTHER SPECIFIED POSTPROCEDURAL STATES: Chronic | ICD-10-CM

## 2022-11-09 LAB
ALBUMIN SERPL ELPH-MCNC: 4.5 G/DL — SIGNIFICANT CHANGE UP (ref 3.3–5)
ALP SERPL-CCNC: 61 U/L — SIGNIFICANT CHANGE UP (ref 40–120)
ALT FLD-CCNC: 25 U/L — SIGNIFICANT CHANGE UP (ref 10–45)
ANION GAP SERPL CALC-SCNC: 12 MMOL/L — SIGNIFICANT CHANGE UP (ref 5–17)
AST SERPL-CCNC: 27 U/L — SIGNIFICANT CHANGE UP (ref 10–40)
BILIRUB SERPL-MCNC: 0.3 MG/DL — SIGNIFICANT CHANGE UP (ref 0.2–1.2)
BUN SERPL-MCNC: 12 MG/DL — SIGNIFICANT CHANGE UP (ref 7–23)
CALCIUM SERPL-MCNC: 9.5 MG/DL — SIGNIFICANT CHANGE UP (ref 8.4–10.5)
CHLORIDE SERPL-SCNC: 103 MMOL/L — SIGNIFICANT CHANGE UP (ref 96–108)
CO2 SERPL-SCNC: 25 MMOL/L — SIGNIFICANT CHANGE UP (ref 22–31)
CREAT SERPL-MCNC: 1.02 MG/DL — SIGNIFICANT CHANGE UP (ref 0.5–1.3)
CRP SERPL-MCNC: <3 MG/L — SIGNIFICANT CHANGE UP (ref 0–4)
EGFR: 99 ML/MIN/1.73M2 — SIGNIFICANT CHANGE UP
GLUCOSE SERPL-MCNC: 97 MG/DL — SIGNIFICANT CHANGE UP (ref 70–99)
HCT VFR BLD CALC: 42.4 % — SIGNIFICANT CHANGE UP (ref 39–50)
HGB BLD-MCNC: 15.1 G/DL — SIGNIFICANT CHANGE UP (ref 13–17)
MCHC RBC-ENTMCNC: 32.3 PG — SIGNIFICANT CHANGE UP (ref 27–34)
MCHC RBC-ENTMCNC: 35.6 GM/DL — SIGNIFICANT CHANGE UP (ref 32–36)
MCV RBC AUTO: 90.6 FL — SIGNIFICANT CHANGE UP (ref 80–100)
NRBC # BLD: 0 /100 WBCS — SIGNIFICANT CHANGE UP (ref 0–0)
PLATELET # BLD AUTO: 235 K/UL — SIGNIFICANT CHANGE UP (ref 150–400)
POTASSIUM SERPL-MCNC: 4.1 MMOL/L — SIGNIFICANT CHANGE UP (ref 3.5–5.3)
POTASSIUM SERPL-SCNC: 4.1 MMOL/L — SIGNIFICANT CHANGE UP (ref 3.5–5.3)
PROT SERPL-MCNC: 7.4 G/DL — SIGNIFICANT CHANGE UP (ref 6–8.3)
RBC # BLD: 4.68 M/UL — SIGNIFICANT CHANGE UP (ref 4.2–5.8)
RBC # FLD: 12.9 % — SIGNIFICANT CHANGE UP (ref 10.3–14.5)
SODIUM SERPL-SCNC: 140 MMOL/L — SIGNIFICANT CHANGE UP (ref 135–145)
WBC # BLD: 6.79 K/UL — SIGNIFICANT CHANGE UP (ref 3.8–10.5)
WBC # FLD AUTO: 6.79 K/UL — SIGNIFICANT CHANGE UP (ref 3.8–10.5)

## 2022-11-09 PROCEDURE — 36415 COLL VENOUS BLD VENIPUNCTURE: CPT

## 2022-11-09 PROCEDURE — 96413 CHEMO IV INFUSION 1 HR: CPT

## 2022-11-09 PROCEDURE — 80053 COMPREHEN METABOLIC PANEL: CPT

## 2022-11-09 PROCEDURE — 82542 COL CHROMOTOGRAPHY QUAL/QUAN: CPT

## 2022-11-09 PROCEDURE — 85027 COMPLETE CBC AUTOMATED: CPT

## 2022-11-09 PROCEDURE — 96415 CHEMO IV INFUSION ADDL HR: CPT

## 2022-11-09 PROCEDURE — 80230 DRUG ASSAY INFLIXIMAB: CPT

## 2022-11-09 PROCEDURE — 86140 C-REACTIVE PROTEIN: CPT

## 2022-11-09 RX ORDER — INFLIXIMAB-DYYB 120 MG/ML
800 INJECTION SUBCUTANEOUS ONCE
Refills: 0 | Status: COMPLETED | OUTPATIENT
Start: 2022-11-09 | End: 2022-11-09

## 2022-11-09 RX ADMIN — INFLIXIMAB-DYYB 800 MILLIGRAM(S): 120 INJECTION SUBCUTANEOUS at 16:33

## 2022-11-09 RX ADMIN — INFLIXIMAB-DYYB 155 MILLIGRAM(S): 120 INJECTION SUBCUTANEOUS at 14:50

## 2022-11-10 ENCOUNTER — TRANSCRIPTION ENCOUNTER (OUTPATIENT)
Age: 32
End: 2022-11-10

## 2022-11-21 ENCOUNTER — NON-APPOINTMENT (OUTPATIENT)
Age: 32
End: 2022-11-21

## 2022-11-21 ENCOUNTER — LABORATORY RESULT (OUTPATIENT)
Age: 32
End: 2022-11-21

## 2022-11-22 ENCOUNTER — OUTPATIENT (OUTPATIENT)
Dept: OUTPATIENT SERVICES | Facility: HOSPITAL | Age: 32
LOS: 1 days | Discharge: HOME | End: 2022-11-22
Payer: COMMERCIAL

## 2022-11-22 ENCOUNTER — TRANSCRIPTION ENCOUNTER (OUTPATIENT)
Age: 32
End: 2022-11-22

## 2022-11-22 ENCOUNTER — APPOINTMENT (OUTPATIENT)
Dept: GASTROENTEROLOGY | Facility: HOSPITAL | Age: 32
End: 2022-11-22

## 2022-11-22 ENCOUNTER — RESULT REVIEW (OUTPATIENT)
Age: 32
End: 2022-11-22

## 2022-11-22 VITALS
DIASTOLIC BLOOD PRESSURE: 85 MMHG | SYSTOLIC BLOOD PRESSURE: 128 MMHG | OXYGEN SATURATION: 97 % | RESPIRATION RATE: 16 BRPM | WEIGHT: 169.98 LBS | HEART RATE: 83 BPM | HEIGHT: 71 IN

## 2022-11-22 DIAGNOSIS — Z98.890 OTHER SPECIFIED POSTPROCEDURAL STATES: Chronic | ICD-10-CM

## 2022-11-22 PROCEDURE — 45380 COLONOSCOPY AND BIOPSY: CPT

## 2022-11-22 PROCEDURE — 88305 TISSUE EXAM BY PATHOLOGIST: CPT | Mod: 26

## 2022-11-22 PROCEDURE — 88305 TISSUE EXAM BY PATHOLOGIST: CPT

## 2022-11-22 NOTE — PRE-ANESTHESIA EVALUATION ADULT - NSANTHOSAYNRD_GEN_A_CORE
No. HANH screening performed.  STOP BANG Legend: 0-2 = LOW Risk; 3-4 = INTERMEDIATE Risk; 5-8 = HIGH Risk

## 2022-11-28 LAB — SURGICAL PATHOLOGY STUDY: SIGNIFICANT CHANGE UP

## 2022-12-02 ENCOUNTER — APPOINTMENT (OUTPATIENT)
Dept: INFUSION THERAPY | Facility: CLINIC | Age: 32
End: 2022-12-02

## 2022-12-06 ENCOUNTER — TRANSCRIPTION ENCOUNTER (OUTPATIENT)
Age: 32
End: 2022-12-06

## 2022-12-07 ENCOUNTER — APPOINTMENT (OUTPATIENT)
Dept: GASTROENTEROLOGY | Facility: CLINIC | Age: 32
End: 2022-12-07

## 2022-12-07 ENCOUNTER — OUTPATIENT (OUTPATIENT)
Dept: OUTPATIENT SERVICES | Facility: HOSPITAL | Age: 32
LOS: 1 days | End: 2022-12-07
Payer: COMMERCIAL

## 2022-12-07 ENCOUNTER — APPOINTMENT (OUTPATIENT)
Dept: INFUSION THERAPY | Facility: CLINIC | Age: 32
End: 2022-12-07

## 2022-12-07 VITALS
DIASTOLIC BLOOD PRESSURE: 78 MMHG | HEART RATE: 70 BPM | RESPIRATION RATE: 16 BRPM | BODY MASS INDEX: 25.2 KG/M2 | TEMPERATURE: 96.5 F | SYSTOLIC BLOOD PRESSURE: 115 MMHG | OXYGEN SATURATION: 98 % | WEIGHT: 180 LBS | HEIGHT: 71 IN

## 2022-12-07 VITALS
TEMPERATURE: 98 F | RESPIRATION RATE: 18 BRPM | HEART RATE: 78 BPM | SYSTOLIC BLOOD PRESSURE: 114 MMHG | OXYGEN SATURATION: 99 % | DIASTOLIC BLOOD PRESSURE: 78 MMHG

## 2022-12-07 DIAGNOSIS — K50.90 CROHN'S DISEASE, UNSPECIFIED, WITHOUT COMPLICATIONS: ICD-10-CM

## 2022-12-07 DIAGNOSIS — Z98.890 OTHER SPECIFIED POSTPROCEDURAL STATES: Chronic | ICD-10-CM

## 2022-12-07 LAB
CRP SERPL-MCNC: <3 MG/L — SIGNIFICANT CHANGE UP (ref 0–4)
HCT VFR BLD CALC: 44.5 % — SIGNIFICANT CHANGE UP (ref 39–50)
HGB BLD-MCNC: 15.1 G/DL — SIGNIFICANT CHANGE UP (ref 13–17)
MCHC RBC-ENTMCNC: 31.4 PG — SIGNIFICANT CHANGE UP (ref 27–34)
MCHC RBC-ENTMCNC: 33.9 GM/DL — SIGNIFICANT CHANGE UP (ref 32–36)
MCV RBC AUTO: 92.5 FL — SIGNIFICANT CHANGE UP (ref 80–100)
NRBC # BLD: 0 /100 WBCS — SIGNIFICANT CHANGE UP (ref 0–0)
PLATELET # BLD AUTO: 213 K/UL — SIGNIFICANT CHANGE UP (ref 150–400)
RBC # BLD: 4.81 M/UL — SIGNIFICANT CHANGE UP (ref 4.2–5.8)
RBC # FLD: 12.6 % — SIGNIFICANT CHANGE UP (ref 10.3–14.5)
WBC # BLD: 3.95 K/UL — SIGNIFICANT CHANGE UP (ref 3.8–10.5)
WBC # FLD AUTO: 3.95 K/UL — SIGNIFICANT CHANGE UP (ref 3.8–10.5)

## 2022-12-07 PROCEDURE — 80053 COMPREHEN METABOLIC PANEL: CPT

## 2022-12-07 PROCEDURE — 80230 DRUG ASSAY INFLIXIMAB: CPT

## 2022-12-07 PROCEDURE — 96413 CHEMO IV INFUSION 1 HR: CPT

## 2022-12-07 PROCEDURE — 99214 OFFICE O/P EST MOD 30 MIN: CPT

## 2022-12-07 PROCEDURE — 86140 C-REACTIVE PROTEIN: CPT

## 2022-12-07 PROCEDURE — 82542 COL CHROMOTOGRAPHY QUAL/QUAN: CPT

## 2022-12-07 PROCEDURE — 85027 COMPLETE CBC AUTOMATED: CPT

## 2022-12-07 PROCEDURE — 36415 COLL VENOUS BLD VENIPUNCTURE: CPT

## 2022-12-07 RX ORDER — FAMOTIDINE 40 MG/1
40 TABLET, FILM COATED ORAL DAILY
Qty: 30 | Refills: 2 | Status: DISCONTINUED | COMMUNITY
Start: 2020-10-16 | End: 2022-12-07

## 2022-12-07 RX ORDER — INFLIXIMAB-DYYB 120 MG/ML
800 INJECTION SUBCUTANEOUS ONCE
Refills: 0 | Status: COMPLETED | OUTPATIENT
Start: 2022-12-07 | End: 2022-12-07

## 2022-12-07 RX ADMIN — INFLIXIMAB-DYYB 800 MILLIGRAM(S): 120 INJECTION SUBCUTANEOUS at 16:40

## 2022-12-07 RX ADMIN — INFLIXIMAB-DYYB 155 MILLIGRAM(S): 120 INJECTION SUBCUTANEOUS at 15:40

## 2022-12-07 NOTE — PHYSICAL EXAM
[General Appearance - Alert] : alert [General Appearance - In No Acute Distress] : in no acute distress [Alert] : alert [Normal Voice/Communication] : normal voice/communication [Healthy Appearing] : healthy appearing [No Acute Distress] : no acute distress [Sclera] : the sclera and conjunctiva were normal [Hearing Threshold Finger Rub Not [place]] : hearing was normal [Normal Lips/Gums] : the lips and gums were normal [Oropharynx] : the oropharynx was normal [Normal Appearance] : the appearance of the neck was normal [No Neck Mass] : no neck mass was observed [No Respiratory Distress] : no respiratory distress [No Acc Muscle Use] : no accessory muscle use [Respiration, Rhythm And Depth] : normal respiratory rhythm and effort [Auscultation Breath Sounds / Voice Sounds] : lungs were clear to auscultation bilaterally [Heart Rate And Rhythm] : heart rate was normal and rhythm regular [Normal S1, S2] : normal S1 and S2 [Murmurs] : no murmurs [Bowel Sounds] : normal bowel sounds [Abdomen Tenderness] : non-tender [No Masses] : no abdominal mass palpated [Abdomen Soft] : soft [] : no hepatosplenomegaly [Oriented To Time, Place, And Person] : oriented to person, place, and time

## 2022-12-08 LAB
ALBUMIN SERPL ELPH-MCNC: 4.3 G/DL — SIGNIFICANT CHANGE UP (ref 3.3–5)
ALP SERPL-CCNC: 56 U/L — SIGNIFICANT CHANGE UP (ref 40–120)
ALT FLD-CCNC: 35 U/L — SIGNIFICANT CHANGE UP (ref 10–45)
ANION GAP SERPL CALC-SCNC: 19 MMOL/L — HIGH (ref 5–17)
AST SERPL-CCNC: 50 U/L — HIGH (ref 10–40)
BILIRUB SERPL-MCNC: 0.2 MG/DL — SIGNIFICANT CHANGE UP (ref 0.2–1.2)
BUN SERPL-MCNC: 9 MG/DL — SIGNIFICANT CHANGE UP (ref 7–23)
CALCIUM SERPL-MCNC: 9.5 MG/DL — SIGNIFICANT CHANGE UP (ref 8.4–10.5)
CHLORIDE SERPL-SCNC: 100 MMOL/L — SIGNIFICANT CHANGE UP (ref 96–108)
CO2 SERPL-SCNC: 20 MMOL/L — LOW (ref 22–31)
CREAT SERPL-MCNC: 0.99 MG/DL — SIGNIFICANT CHANGE UP (ref 0.5–1.3)
EGFR: 103 ML/MIN/1.73M2 — SIGNIFICANT CHANGE UP
GLUCOSE SERPL-MCNC: 91 MG/DL — SIGNIFICANT CHANGE UP (ref 70–99)
POTASSIUM SERPL-MCNC: 4.4 MMOL/L — SIGNIFICANT CHANGE UP (ref 3.5–5.3)
POTASSIUM SERPL-SCNC: 4.4 MMOL/L — SIGNIFICANT CHANGE UP (ref 3.5–5.3)
PROT SERPL-MCNC: 7.8 G/DL — SIGNIFICANT CHANGE UP (ref 6–8.3)
SODIUM SERPL-SCNC: 139 MMOL/L — SIGNIFICANT CHANGE UP (ref 135–145)

## 2022-12-10 NOTE — ASSESSMENT
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 \par \par # CD s/p SBR for multiple unrelated reasons?\par - feeling great\par - reviewed CSCOPE findings in detail\par - advised hyperbaric chamber for non-healing anastomotic ulcer which has  ischemic component ; will re-evaluate after CSCOPE to consider APC and clipping \par - pt considering changing job/insurance and location therefore inquiring about injection medicine VS infusions; discussed RALPH and UST \par - Continue with IFX 10 mg/kg q4 weeks, drug levels remain therapeutic with no ab\par - Continue to monitor drug levels which are > 34 with no antibodies \par \par # Mild anemia- improved s/p Fe infusion this summer - stable \par - cont b12, and iron infusions PRN with Heme (about once/year) \par \par # Bile acid diarrhea\par - c/w cholestyramine PRN, titrate to 1-2 BMs daily (asked him to consider taking with his largest meals)\par \par #HSV \par - valacyclovir 1GM PRN due to recurrent HSV mouth sores \par \par #HCM\par - seldom smoking Cigarettes, (1 cigarette per month)\par - + marijuana use recreationally 3x/week\par - emphasized importance of NSAID avoidance, denies use currently \par - denies depression - sees therapist regularly \par - S/p COVID vaccine and booster\par - emphasized importance of tobacco, cocaine, and alcohol avoidance \par - immune to Hep B\par - TB negative 2018 \par - pending derm FBSE Jan 2023\par - declines flu shot\par \par F/U with NP in 4 mo, sooner if indicated

## 2022-12-10 NOTE — HISTORY OF PRESENT ILLNESS
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 \par \par Pt reports that he was doing well until early July when he had an episode of 2 BM with BRBPR which resolved spontaneously. Since that time he's been having 1-2 formed BM daily (on cholestyramine) with no blood or mucous. Denies urgency or rectal pain. Previous colonoscopy as below with healing anastomotic ulcer.\par \par CSCOPE 11/22/22\par The colon appeared unremarkable to the level of the ileocolonic side to side \par anastomosis. There was a friable anastomotic ulcer around 50% of the \par circumference. The blind end of the ileum had a 0.5cm ulcer. The afferent \par neoterminal ileum was unremarkable. (Biopsy) This is slightly worse than the \par 2020 evaluation c/w i2b recurrence. \par PATH: 1. Neoterminal ileum; biopsy:\par - Small intestine mucosa without significant histopathologic\par findings.\par \par \par 2. Colon, transverse; biopsy:\par - Colonic mucosa without significant histopathologic findings.\par \par \par Last iron infusions this past summer 2022. \par \par IFX levels ~ 34, no antibodies\par CRP and hgb normal\par \par Previous history \par June 2019, patient's IFX dose was increased to 10mg/kg from 5mg/kg. After 2 sessions of the increased dose, he underwent a colonoscopy 9/5/19 to see whether there was any improvement in the ulcerations with higher dose of infliximab. Colonoscopy showed i1 disease, and anastomotic ulcer was still present friable with spontaneous oozing, seemed to have extended since prior colonoscopy. At that time discussed the possibility of a hyperbaric chamber with high dose oxygen as treatment for the ulcer, which has been tried for other indications. He was unable to find a place that accepted his insurance. \par \par Colonoscopy 9/5/19: side to side anastomosis, Leonard-TI with 2 aphthous ulcers, Ruutgers score i1, anastomotic ulcer friable with spontaneous oozing. Seemed to have extended since prior colon despite increased IFX. Ulcer not likely inflammatory in nature. \par Pathology: unremarkable\par \par EIMs: No\par \par CBC, CMP unremarkable from previous infusion \par -------------------------------------------------------------------------------------------------------------------\par PRIOR Hx - \par 28 Y M, hx of prior small bowel resection - first for ileo-cecal intussusception as an infant in 1990, and then distal SBR in 2013 for GI bleed; presents today s/p hospitalization and emergent weekend endoscopy for GI bleed and new diagnosis of Crohn's Disease. bx apparently from ileocolonic anastomosis, but VCE showed numerous diffuse sb aphthae.\par \par The patient had sx from his teen years, but moved around and didn't seek care during periods of wellness and also didn't have insurance coverage for a portion of the time. Before moving to [place], he had several bleeding episodes, one which req. laparoscopic evaluation and resection of small bowel (in [place]) with unclear pathology. NO one has told him he had any evidence of Crohn's in past. \par \par Pt was hospitalized in February and in April for melanic stools and anemia. He was transfused both admissions. He also had EGD/Colonoscopy and VCE (no reports available) that revealed multiple ulcers at prior ileo-cecal anastomosis w/biopsies that showed active chronic enteritis. \par EGD unrevealing.\par \par Pt was admitted to [facility] from 7/20-7/27/18 for recurrent LGIB 2/2 ileocolic anastomotic ulcer. He underwent a laparoscopic ileocolic resection with side-side anastomosis on 7/23/17. \par Since then, he has received two more loading doses of IFX. \par \par \par Prior biopsies of small bowel reveal active chronic enteritis with cryptitis and crypt architecture distortion. VCE revealed multiple ulcers in the jejunum and terminal ileum. \par Review of op report from [place] 2013 for GIB from anastamotic ulcer:\par 1. ileocectomy with Resection of previous ileal-ileal anastamosis\par 2. Mid ileum resection\par 3. Resection of previous jejunum to jeunum anastamosis; ?which had been bypassed; resected in setting of GIB. \par  \par Fam hx: no IBD or CRC\par Social hx: tobacco user 5x/week, alcohol use 5 drinks/week, marijuana use on occasion not weekly, NSAID use once/two weeks for HAs.

## 2022-12-23 ENCOUNTER — APPOINTMENT (OUTPATIENT)
Dept: INFUSION THERAPY | Facility: CLINIC | Age: 32
End: 2022-12-23

## 2023-01-03 ENCOUNTER — APPOINTMENT (OUTPATIENT)
Dept: DERMATOLOGY | Facility: CLINIC | Age: 33
End: 2023-01-03
Payer: COMMERCIAL

## 2023-01-03 DIAGNOSIS — L82.1 OTHER SEBORRHEIC KERATOSIS: ICD-10-CM

## 2023-01-03 DIAGNOSIS — L81.4 OTHER MELANIN HYPERPIGMENTATION: ICD-10-CM

## 2023-01-03 DIAGNOSIS — Z12.83 ENCOUNTER FOR SCREENING FOR MALIGNANT NEOPLASM OF SKIN: ICD-10-CM

## 2023-01-03 DIAGNOSIS — Z92.25 PERSONAL HISTORY OF IMMUNOSUPRESSION THERAPY: ICD-10-CM

## 2023-01-03 PROCEDURE — 99213 OFFICE O/P EST LOW 20 MIN: CPT | Mod: 25

## 2023-01-03 PROCEDURE — 17110 DESTRUCTION B9 LES UP TO 14: CPT

## 2023-01-03 NOTE — PHYSICAL EXAM
[Alert] : alert [Oriented x 3] : ~L oriented x 3 [Full Body Skin Exam Performed] : performed [FreeTextEntry3] : Type II skin\par Numerous brown symmetric macules on sun exposed face, neck, chest, back\par Stuck on brown papules in R axilla; one erythematous stuck on papule\par Few symmetric light brown/pink papules on trunk, no ABCD features

## 2023-01-03 NOTE — HISTORY OF PRESENT ILLNESS
[FreeTextEntry1] : Mole check - RPA [de-identified] : TIMBO JACOBSONBROOKLYN is a 32 year M who presents for evaluation of:\par \par Mole check\par Hx of immunosuppression (remicade for Crohn's disease)\par No personal hx of skin cancer\par Concerned about irritated lesion on R axilla\par Practices photoprotection

## 2023-01-03 NOTE — ASSESSMENT
[FreeTextEntry1] : 1. Skin cancer screening in setting of\par 2. Hx of immunosuppression (Remicade for CD)\par - A complete skin exam was performed\par - No concerning lesions identified\par - Photoprotection was discussed including sunscreen and sun protective clothing\par - Call for new or changing lesions\par \par 3. Lentigines\par - Benign, reassured\par \par 4. Irritated SK R axilla\par - The risks/benefits/alternatives of cryo-destruction was explained to the patient. The patient expressed understanding of these risks and agreed to the procedure. #1 lesions treated with 2 cycles of LN2. The procedure was well tolerated, without complication. We have discussed related skin care.\par \par 5. Multiple nevi\par - Benign appearing on today's examination\par - Monitor for change\par \par RTC yearly

## 2023-01-04 ENCOUNTER — APPOINTMENT (OUTPATIENT)
Dept: INFUSION THERAPY | Facility: CLINIC | Age: 33
End: 2023-01-04

## 2023-01-04 ENCOUNTER — OUTPATIENT (OUTPATIENT)
Dept: OUTPATIENT SERVICES | Facility: HOSPITAL | Age: 33
LOS: 1 days | End: 2023-01-04
Payer: COMMERCIAL

## 2023-01-04 VITALS
TEMPERATURE: 98 F | OXYGEN SATURATION: 97 % | RESPIRATION RATE: 17 BRPM | DIASTOLIC BLOOD PRESSURE: 76 MMHG | SYSTOLIC BLOOD PRESSURE: 123 MMHG | HEART RATE: 82 BPM

## 2023-01-04 DIAGNOSIS — Z98.890 OTHER SPECIFIED POSTPROCEDURAL STATES: Chronic | ICD-10-CM

## 2023-01-04 DIAGNOSIS — K50.90 CROHN'S DISEASE, UNSPECIFIED, WITHOUT COMPLICATIONS: ICD-10-CM

## 2023-01-04 LAB
ALBUMIN SERPL ELPH-MCNC: 4 G/DL — SIGNIFICANT CHANGE UP (ref 3.3–5)
ALP SERPL-CCNC: 60 U/L — SIGNIFICANT CHANGE UP (ref 40–120)
ALT FLD-CCNC: 25 U/L — SIGNIFICANT CHANGE UP (ref 10–45)
ANION GAP SERPL CALC-SCNC: 11 MMOL/L — SIGNIFICANT CHANGE UP (ref 5–17)
AST SERPL-CCNC: 29 U/L — SIGNIFICANT CHANGE UP (ref 10–40)
BILIRUB SERPL-MCNC: 0.4 MG/DL — SIGNIFICANT CHANGE UP (ref 0.2–1.2)
BUN SERPL-MCNC: 14 MG/DL — SIGNIFICANT CHANGE UP (ref 7–23)
CALCIUM SERPL-MCNC: 9.1 MG/DL — SIGNIFICANT CHANGE UP (ref 8.4–10.5)
CHLORIDE SERPL-SCNC: 100 MMOL/L — SIGNIFICANT CHANGE UP (ref 96–108)
CO2 SERPL-SCNC: 25 MMOL/L — SIGNIFICANT CHANGE UP (ref 22–31)
CREAT SERPL-MCNC: 1.13 MG/DL — SIGNIFICANT CHANGE UP (ref 0.5–1.3)
CRP SERPL-MCNC: <3 MG/L — SIGNIFICANT CHANGE UP (ref 0–4)
EGFR: 87 ML/MIN/1.73M2 — SIGNIFICANT CHANGE UP
GLUCOSE SERPL-MCNC: 89 MG/DL — SIGNIFICANT CHANGE UP (ref 70–99)
HCT VFR BLD CALC: 40.3 % — SIGNIFICANT CHANGE UP (ref 39–50)
HGB BLD-MCNC: 14.4 G/DL — SIGNIFICANT CHANGE UP (ref 13–17)
MCHC RBC-ENTMCNC: 32.1 PG — SIGNIFICANT CHANGE UP (ref 27–34)
MCHC RBC-ENTMCNC: 35.7 GM/DL — SIGNIFICANT CHANGE UP (ref 32–36)
MCV RBC AUTO: 89.8 FL — SIGNIFICANT CHANGE UP (ref 80–100)
NRBC # BLD: 0 /100 WBCS — SIGNIFICANT CHANGE UP (ref 0–0)
PLATELET # BLD AUTO: 236 K/UL — SIGNIFICANT CHANGE UP (ref 150–400)
POTASSIUM SERPL-MCNC: 4 MMOL/L — SIGNIFICANT CHANGE UP (ref 3.5–5.3)
POTASSIUM SERPL-SCNC: 4 MMOL/L — SIGNIFICANT CHANGE UP (ref 3.5–5.3)
PROT SERPL-MCNC: 7 G/DL — SIGNIFICANT CHANGE UP (ref 6–8.3)
RBC # BLD: 4.49 M/UL — SIGNIFICANT CHANGE UP (ref 4.2–5.8)
RBC # FLD: 12.6 % — SIGNIFICANT CHANGE UP (ref 10.3–14.5)
SODIUM SERPL-SCNC: 136 MMOL/L — SIGNIFICANT CHANGE UP (ref 135–145)
WBC # BLD: 6.2 K/UL — SIGNIFICANT CHANGE UP (ref 3.8–10.5)
WBC # FLD AUTO: 6.2 K/UL — SIGNIFICANT CHANGE UP (ref 3.8–10.5)

## 2023-01-04 PROCEDURE — 80230 DRUG ASSAY INFLIXIMAB: CPT

## 2023-01-04 PROCEDURE — 36415 COLL VENOUS BLD VENIPUNCTURE: CPT

## 2023-01-04 PROCEDURE — 86140 C-REACTIVE PROTEIN: CPT

## 2023-01-04 PROCEDURE — 85027 COMPLETE CBC AUTOMATED: CPT

## 2023-01-04 PROCEDURE — 82542 COL CHROMOTOGRAPHY QUAL/QUAN: CPT

## 2023-01-04 PROCEDURE — 96413 CHEMO IV INFUSION 1 HR: CPT

## 2023-01-04 PROCEDURE — 96415 CHEMO IV INFUSION ADDL HR: CPT

## 2023-01-04 PROCEDURE — 80053 COMPREHEN METABOLIC PANEL: CPT

## 2023-01-04 RX ORDER — INFLIXIMAB-DYYB 120 MG/ML
800 INJECTION SUBCUTANEOUS ONCE
Refills: 0 | Status: COMPLETED | OUTPATIENT
Start: 2023-01-04 | End: 2023-01-04

## 2023-01-04 RX ADMIN — INFLIXIMAB-DYYB 800 MILLIGRAM(S): 120 INJECTION SUBCUTANEOUS at 17:31

## 2023-01-04 RX ADMIN — INFLIXIMAB-DYYB 155 MILLIGRAM(S): 120 INJECTION SUBCUTANEOUS at 15:41

## 2023-01-17 RX ORDER — FAMOTIDINE 40 MG/1
40 TABLET, FILM COATED ORAL DAILY
Qty: 90 | Refills: 2 | Status: ACTIVE | COMMUNITY
Start: 1900-01-01 | End: 1900-01-01

## 2023-01-17 RX ORDER — VALACYCLOVIR 1 G/1
1 TABLET, FILM COATED ORAL
Qty: 90 | Refills: 3 | Status: ACTIVE | COMMUNITY
Start: 2019-01-16 | End: 1900-01-01

## 2023-02-01 ENCOUNTER — OUTPATIENT (OUTPATIENT)
Dept: OUTPATIENT SERVICES | Facility: HOSPITAL | Age: 33
LOS: 1 days | End: 2023-02-01
Payer: COMMERCIAL

## 2023-02-01 ENCOUNTER — APPOINTMENT (OUTPATIENT)
Dept: INFUSION THERAPY | Facility: CLINIC | Age: 33
End: 2023-02-01

## 2023-02-01 VITALS
HEIGHT: 71 IN | RESPIRATION RATE: 17 BRPM | TEMPERATURE: 99 F | OXYGEN SATURATION: 96 % | HEART RATE: 79 BPM | DIASTOLIC BLOOD PRESSURE: 84 MMHG | WEIGHT: 169.98 LBS | SYSTOLIC BLOOD PRESSURE: 132 MMHG

## 2023-02-01 DIAGNOSIS — K50.90 CROHN'S DISEASE, UNSPECIFIED, WITHOUT COMPLICATIONS: ICD-10-CM

## 2023-02-01 DIAGNOSIS — Z98.890 OTHER SPECIFIED POSTPROCEDURAL STATES: Chronic | ICD-10-CM

## 2023-02-01 LAB
ALBUMIN SERPL ELPH-MCNC: 3.7 G/DL — SIGNIFICANT CHANGE UP (ref 3.3–5)
ALP SERPL-CCNC: 53 U/L — SIGNIFICANT CHANGE UP (ref 40–120)
ALT FLD-CCNC: SIGNIFICANT CHANGE UP (ref 10–45)
ANION GAP SERPL CALC-SCNC: 10 MMOL/L — SIGNIFICANT CHANGE UP (ref 5–17)
AST SERPL-CCNC: SIGNIFICANT CHANGE UP (ref 10–40)
BILIRUB SERPL-MCNC: 0.4 MG/DL — SIGNIFICANT CHANGE UP (ref 0.2–1.2)
BUN SERPL-MCNC: 13 MG/DL — SIGNIFICANT CHANGE UP (ref 7–23)
CALCIUM SERPL-MCNC: 9 MG/DL — SIGNIFICANT CHANGE UP (ref 8.4–10.5)
CHLORIDE SERPL-SCNC: 103 MMOL/L — SIGNIFICANT CHANGE UP (ref 96–108)
CO2 SERPL-SCNC: 24 MMOL/L — SIGNIFICANT CHANGE UP (ref 22–31)
CREAT SERPL-MCNC: 1.03 MG/DL — SIGNIFICANT CHANGE UP (ref 0.5–1.3)
CRP SERPL-MCNC: <3 MG/L — SIGNIFICANT CHANGE UP (ref 0–4)
EGFR: 98 ML/MIN/1.73M2 — SIGNIFICANT CHANGE UP
GLUCOSE SERPL-MCNC: 100 MG/DL — HIGH (ref 70–99)
HCT VFR BLD CALC: 41.7 % — SIGNIFICANT CHANGE UP (ref 39–50)
HGB BLD-MCNC: 14.1 G/DL — SIGNIFICANT CHANGE UP (ref 13–17)
MCHC RBC-ENTMCNC: 29.8 PG — SIGNIFICANT CHANGE UP (ref 27–34)
MCHC RBC-ENTMCNC: 33.8 GM/DL — SIGNIFICANT CHANGE UP (ref 32–36)
MCV RBC AUTO: 88.2 FL — SIGNIFICANT CHANGE UP (ref 80–100)
NRBC # BLD: 0 /100 WBCS — SIGNIFICANT CHANGE UP (ref 0–0)
PLATELET # BLD AUTO: 246 K/UL — SIGNIFICANT CHANGE UP (ref 150–400)
POTASSIUM SERPL-MCNC: SIGNIFICANT CHANGE UP (ref 3.5–5.3)
POTASSIUM SERPL-SCNC: SIGNIFICANT CHANGE UP (ref 3.5–5.3)
PROT SERPL-MCNC: 6.9 G/DL — SIGNIFICANT CHANGE UP (ref 6–8.3)
RBC # BLD: 4.73 M/UL — SIGNIFICANT CHANGE UP (ref 4.2–5.8)
RBC # FLD: 12.7 % — SIGNIFICANT CHANGE UP (ref 10.3–14.5)
SODIUM SERPL-SCNC: 137 MMOL/L — SIGNIFICANT CHANGE UP (ref 135–145)
WBC # BLD: 7.46 K/UL — SIGNIFICANT CHANGE UP (ref 3.8–10.5)
WBC # FLD AUTO: 7.46 K/UL — SIGNIFICANT CHANGE UP (ref 3.8–10.5)

## 2023-02-01 PROCEDURE — 86140 C-REACTIVE PROTEIN: CPT

## 2023-02-01 PROCEDURE — 80053 COMPREHEN METABOLIC PANEL: CPT

## 2023-02-01 PROCEDURE — 96413 CHEMO IV INFUSION 1 HR: CPT

## 2023-02-01 PROCEDURE — 85027 COMPLETE CBC AUTOMATED: CPT

## 2023-02-01 PROCEDURE — 80230 DRUG ASSAY INFLIXIMAB: CPT

## 2023-02-01 PROCEDURE — 82542 COL CHROMOTOGRAPHY QUAL/QUAN: CPT

## 2023-02-01 PROCEDURE — 36415 COLL VENOUS BLD VENIPUNCTURE: CPT

## 2023-02-01 RX ORDER — INFLIXIMAB-DYYB 120 MG/ML
800 INJECTION SUBCUTANEOUS ONCE
Refills: 0 | Status: COMPLETED | OUTPATIENT
Start: 2023-02-01 | End: 2023-02-01

## 2023-02-01 RX ADMIN — INFLIXIMAB-DYYB 800 MILLIGRAM(S): 120 INJECTION SUBCUTANEOUS at 17:45

## 2023-02-01 RX ADMIN — INFLIXIMAB-DYYB 155 MILLIGRAM(S): 120 INJECTION SUBCUTANEOUS at 16:27

## 2023-03-06 ENCOUNTER — APPOINTMENT (OUTPATIENT)
Dept: INFUSION THERAPY | Facility: CLINIC | Age: 33
End: 2023-03-06

## 2023-04-03 ENCOUNTER — APPOINTMENT (OUTPATIENT)
Dept: INFUSION THERAPY | Facility: CLINIC | Age: 33
End: 2023-04-03

## 2023-04-07 ENCOUNTER — APPOINTMENT (OUTPATIENT)
Dept: GASTROENTEROLOGY | Facility: CLINIC | Age: 33
End: 2023-04-07
Payer: COMMERCIAL

## 2023-04-07 PROCEDURE — 99213 OFFICE O/P EST LOW 20 MIN: CPT | Mod: 95

## 2023-04-07 NOTE — HISTORY OF PRESENT ILLNESS
[Home] : at home, [unfilled] , at the time of the visit. 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 \par \par Pt has been traveling in [place] and [place] the last 8 weeks and has held off on his treatment due to travel and then getting laid off. Despite this he feels fine. Reports IFX may improve his symptoms by 9%. \par \par Pt reports that he was doing well until early July 2022 when he had an episode of 2 BM with BRBPR which resolved spontaneously. Since that time he's been having 1-2 formed BM daily (on cholestyramine) with no blood or mucous. Denies urgency or rectal pain. Previous colonoscopy as below with healing anastomotic ulcer.\par \par CSCOPE 11/22/22\par The colon appeared unremarkable to the level of the ileocolonic side to side \par anastomosis. There was a friable anastomotic ulcer around 50% of the \par circumference. The blind end of the ileum had a 0.5cm ulcer. The afferent \par neoterminal ileum was unremarkable. (Biopsy) This is slightly worse than the \par 2020 evaluation c/w i2b recurrence. \par PATH: 1. Neoterminal ileum; biopsy:\par - Small intestine mucosa without significant histopathologic\par findings.\par \par \par 2. Colon, transverse; biopsy:\par - Colonic mucosa without significant histopathologic findings.\par \par \par Last iron infusions this past summer 2022. \par \par IFX levels ~ 34, no antibodies\par CRP and hgb normal\par \par Previous history \par June 2019, patient's IFX dose was increased to 10mg/kg from 5mg/kg. After 2 sessions of the increased dose, he underwent a colonoscopy 9/5/19 to see whether there was any improvement in the ulcerations with higher dose of infliximab. Colonoscopy showed i1 disease, and anastomotic ulcer was still present friable with spontaneous oozing, seemed to have extended since prior colonoscopy. At that time discussed the possibility of a hyperbaric chamber with high dose oxygen as treatment for the ulcer, which has been tried for other indications. He was unable to find a place that accepted his insurance. \par \par Colonoscopy 9/5/19: side to side anastomosis, Leonard-TI with 2 aphthous ulcers, Ruutgers score i1, anastomotic ulcer friable with spontaneous oozing. Seemed to have extended since prior colon despite increased IFX. Ulcer not likely inflammatory in nature. \par Pathology: unremarkable\par \par EIMs: No\par \par CBC, CMP unremarkable from previous infusion \par -------------------------------------------------------------------------------------------------------------------\par PRIOR Hx - \par 28 Y M, hx of prior small bowel resection - first for ileo-cecal intussusception as an infant in 1990, and then distal SBR in 2013 for GI bleed; presents today s/p hospitalization and emergent weekend endoscopy for GI bleed and new diagnosis of Crohn's Disease. bx apparently from ileocolonic anastomosis, but VCE showed numerous diffuse sb aphthae.\par \par The patient had sx from his teen years, but moved around and didn't seek care during periods of wellness and also didn't have insurance coverage for a portion of the time. Before moving to [place], he had several bleeding episodes, one which req. laparoscopic evaluation and resection of small bowel (in [place]) with unclear pathology. NO one has told him he had any evidence of Crohn's in past. \par \par Pt was hospitalized in February and in April for melanic stools and anemia. He was transfused both admissions. He also had EGD/Colonoscopy and VCE (no reports available) that revealed multiple ulcers at prior ileo-cecal anastomosis w/biopsies that showed active chronic enteritis. \par EGD unrevealing.\par \par Pt was admitted to [facility] from 7/20-7/27/18 for recurrent LGIB 2/2 ileocolic anastomotic ulcer. He underwent a laparoscopic ileocolic resection with side-side anastomosis on 7/23/17. \par Since then, he has received two more loading doses of IFX. \par \par \par Prior biopsies of small bowel reveal active chronic enteritis with cryptitis and crypt architecture distortion. VCE revealed multiple ulcers in the jejunum and terminal ileum. \par Review of op report from [place] 2013 for GIB from anastamotic ulcer:\par 1. ileocectomy with Resection of previous ileal-ileal anastamosis\par 2. Mid ileum resection\par 3. Resection of previous jejunum to jeunum anastamosis; ?which had been bypassed; resected in setting of GIB. \par  \par Fam hx: no IBD or CRC\par Social hx: tobacco user 5x/week, alcohol use 5 drinks/week, marijuana use on occasion not weekly, NSAID use once/two weeks for HAs.

## 2023-04-07 NOTE — ASSESSMENT
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 \par \par # CD s/p SBR for multiple unrelated reasons?\par - feeling great however is now 9 weeks delayed due to recent traveling and being laid off from his job; pt prefers to be closer to home for his infusions; plan to transfer him to [facility] after his next infusion at [facility] \par - reviewed CSCOPE findings in detail\par - advised hyperbaric chamber for non-healing anastomotic ulcer which has  ischemic component; will re-evaluate after CSCOPE to consider APC and clipping \par - Continue with IFX 10 mg/kg q4 weeks, drug levels remained therapeutic with no ab - will check level now that he is 8 weeks late\par - Continue to monitor drug levels which are > 34 with no antibodies \par \par # Mild anemia- improved s/p Fe infusion this summer - stable \par - cont b12, and iron infusions PRN with Heme (about once/year) \par \par # Bile acid diarrhea\par - c/w cholestyramine PRN, titrate to 1-2 BMs daily (asked him to consider taking with his largest meals)\par \par #HSV \par - valacyclovir 1GM PRN due to recurrent HSV mouth sores \par \par #HCM\par - seldom smoking Cigarettes, (1 cigarette per month)\par - + marijuana use recreationally 3x/week\par - emphasized importance of NSAID avoidance, denies use currently \par - denies depression - sees therapist regularly \par - S/p COVID vaccine and booster\par - emphasized importance of tobacco, cocaine, and alcohol avoidance \par - immune to Hep B\par - TB negative 2018 \par - pending derm FBSE Jan 2023\par - declines flu shot\par \par F/U in 6 mo

## 2023-04-14 ENCOUNTER — OUTPATIENT (OUTPATIENT)
Dept: OUTPATIENT SERVICES | Facility: HOSPITAL | Age: 33
LOS: 1 days | End: 2023-04-14
Payer: COMMERCIAL

## 2023-04-14 ENCOUNTER — APPOINTMENT (OUTPATIENT)
Dept: INFUSION THERAPY | Facility: CLINIC | Age: 33
End: 2023-04-14

## 2023-04-14 VITALS
RESPIRATION RATE: 18 BRPM | TEMPERATURE: 98 F | DIASTOLIC BLOOD PRESSURE: 81 MMHG | HEIGHT: 71 IN | SYSTOLIC BLOOD PRESSURE: 118 MMHG | WEIGHT: 169.98 LBS | HEART RATE: 65 BPM | OXYGEN SATURATION: 100 %

## 2023-04-14 VITALS
SYSTOLIC BLOOD PRESSURE: 123 MMHG | RESPIRATION RATE: 18 BRPM | DIASTOLIC BLOOD PRESSURE: 75 MMHG | OXYGEN SATURATION: 99 % | TEMPERATURE: 98 F | HEART RATE: 72 BPM

## 2023-04-14 DIAGNOSIS — K50.90 CROHN'S DISEASE, UNSPECIFIED, WITHOUT COMPLICATIONS: ICD-10-CM

## 2023-04-14 DIAGNOSIS — Z98.890 OTHER SPECIFIED POSTPROCEDURAL STATES: Chronic | ICD-10-CM

## 2023-04-14 LAB
ALBUMIN SERPL ELPH-MCNC: 3.9 G/DL — SIGNIFICANT CHANGE UP (ref 3.3–5)
ALP SERPL-CCNC: 58 U/L — SIGNIFICANT CHANGE UP (ref 40–120)
ALT FLD-CCNC: 26 U/L — SIGNIFICANT CHANGE UP (ref 10–45)
ANION GAP SERPL CALC-SCNC: 8 MMOL/L — SIGNIFICANT CHANGE UP (ref 5–17)
AST SERPL-CCNC: 25 U/L — SIGNIFICANT CHANGE UP (ref 10–40)
BILIRUB SERPL-MCNC: 0.3 MG/DL — SIGNIFICANT CHANGE UP (ref 0.2–1.2)
BUN SERPL-MCNC: 10 MG/DL — SIGNIFICANT CHANGE UP (ref 7–23)
CALCIUM SERPL-MCNC: 9 MG/DL — SIGNIFICANT CHANGE UP (ref 8.4–10.5)
CHLORIDE SERPL-SCNC: 105 MMOL/L — SIGNIFICANT CHANGE UP (ref 96–108)
CO2 SERPL-SCNC: 26 MMOL/L — SIGNIFICANT CHANGE UP (ref 22–31)
CREAT SERPL-MCNC: 1.02 MG/DL — SIGNIFICANT CHANGE UP (ref 0.5–1.3)
CRP SERPL-MCNC: <3 MG/L — SIGNIFICANT CHANGE UP (ref 0–4)
EGFR: 98 ML/MIN/1.73M2 — SIGNIFICANT CHANGE UP
GLUCOSE SERPL-MCNC: 97 MG/DL — SIGNIFICANT CHANGE UP (ref 70–99)
HBV CORE AB SER-ACNC: SIGNIFICANT CHANGE UP
HBV SURFACE AB SER-ACNC: REACTIVE
HBV SURFACE AG SER-ACNC: SIGNIFICANT CHANGE UP
HCT VFR BLD CALC: 43.7 % — SIGNIFICANT CHANGE UP (ref 39–50)
HGB BLD-MCNC: 14.3 G/DL — SIGNIFICANT CHANGE UP (ref 13–17)
MCHC RBC-ENTMCNC: 28.9 PG — SIGNIFICANT CHANGE UP (ref 27–34)
MCHC RBC-ENTMCNC: 32.7 GM/DL — SIGNIFICANT CHANGE UP (ref 32–36)
MCV RBC AUTO: 88.3 FL — SIGNIFICANT CHANGE UP (ref 80–100)
NRBC # BLD: 0 /100 WBCS — SIGNIFICANT CHANGE UP (ref 0–0)
PLATELET # BLD AUTO: 236 K/UL — SIGNIFICANT CHANGE UP (ref 150–400)
POTASSIUM SERPL-MCNC: 4.5 MMOL/L — SIGNIFICANT CHANGE UP (ref 3.5–5.3)
POTASSIUM SERPL-SCNC: 4.5 MMOL/L — SIGNIFICANT CHANGE UP (ref 3.5–5.3)
PROT SERPL-MCNC: 6.4 G/DL — SIGNIFICANT CHANGE UP (ref 6–8.3)
RBC # BLD: 4.95 M/UL — SIGNIFICANT CHANGE UP (ref 4.2–5.8)
RBC # FLD: 13.7 % — SIGNIFICANT CHANGE UP (ref 10.3–14.5)
SODIUM SERPL-SCNC: 139 MMOL/L — SIGNIFICANT CHANGE UP (ref 135–145)
WBC # BLD: 5.75 K/UL — SIGNIFICANT CHANGE UP (ref 3.8–10.5)
WBC # FLD AUTO: 5.75 K/UL — SIGNIFICANT CHANGE UP (ref 3.8–10.5)

## 2023-04-14 PROCEDURE — 86704 HEP B CORE ANTIBODY TOTAL: CPT

## 2023-04-14 PROCEDURE — 80230 DRUG ASSAY INFLIXIMAB: CPT

## 2023-04-14 PROCEDURE — 82542 COL CHROMOTOGRAPHY QUAL/QUAN: CPT

## 2023-04-14 PROCEDURE — 85027 COMPLETE CBC AUTOMATED: CPT

## 2023-04-14 PROCEDURE — 86706 HEP B SURFACE ANTIBODY: CPT

## 2023-04-14 PROCEDURE — 87340 HEPATITIS B SURFACE AG IA: CPT

## 2023-04-14 PROCEDURE — 80053 COMPREHEN METABOLIC PANEL: CPT

## 2023-04-14 PROCEDURE — 96413 CHEMO IV INFUSION 1 HR: CPT

## 2023-04-14 PROCEDURE — 86140 C-REACTIVE PROTEIN: CPT

## 2023-04-14 PROCEDURE — 86480 TB TEST CELL IMMUN MEASURE: CPT

## 2023-04-14 PROCEDURE — 96415 CHEMO IV INFUSION ADDL HR: CPT

## 2023-04-14 PROCEDURE — 36415 COLL VENOUS BLD VENIPUNCTURE: CPT

## 2023-04-14 RX ORDER — INFLIXIMAB-DYYB 120 MG/ML
800 INJECTION SUBCUTANEOUS ONCE
Refills: 0 | Status: COMPLETED | OUTPATIENT
Start: 2023-04-14 | End: 2023-04-14

## 2023-04-14 RX ADMIN — INFLIXIMAB-DYYB 800 MILLIGRAM(S): 120 INJECTION SUBCUTANEOUS at 13:39

## 2023-04-14 RX ADMIN — INFLIXIMAB-DYYB 155 MILLIGRAM(S): 120 INJECTION SUBCUTANEOUS at 11:48

## 2023-04-17 LAB
GAMMA INTERFERON BACKGROUND BLD IA-ACNC: 0.01 IU/ML — SIGNIFICANT CHANGE UP
M TB IFN-G BLD-IMP: NEGATIVE — SIGNIFICANT CHANGE UP
M TB IFN-G CD4+ BCKGRND COR BLD-ACNC: 0 IU/ML — SIGNIFICANT CHANGE UP
M TB IFN-G CD4+CD8+ BCKGRND COR BLD-ACNC: 0.01 IU/ML — SIGNIFICANT CHANGE UP
QUANT TB PLUS MITOGEN MINUS NIL: 9.82 IU/ML — SIGNIFICANT CHANGE UP

## 2023-05-11 ENCOUNTER — MED ADMIN CHARGE (OUTPATIENT)
Age: 33
End: 2023-05-11

## 2023-05-11 ENCOUNTER — APPOINTMENT (OUTPATIENT)
Dept: RHEUMATOLOGY | Facility: CLINIC | Age: 33
End: 2023-05-11
Payer: COMMERCIAL

## 2023-05-11 VITALS
DIASTOLIC BLOOD PRESSURE: 86 MMHG | HEART RATE: 70 BPM | OXYGEN SATURATION: 98 % | TEMPERATURE: 98.3 F | SYSTOLIC BLOOD PRESSURE: 127 MMHG | RESPIRATION RATE: 16 BRPM

## 2023-05-11 VITALS
RESPIRATION RATE: 16 BRPM | OXYGEN SATURATION: 96 % | SYSTOLIC BLOOD PRESSURE: 129 MMHG | HEART RATE: 74 BPM | TEMPERATURE: 98.3 F | DIASTOLIC BLOOD PRESSURE: 86 MMHG

## 2023-05-11 PROCEDURE — 96413 CHEMO IV INFUSION 1 HR: CPT

## 2023-05-11 PROCEDURE — 36415 COLL VENOUS BLD VENIPUNCTURE: CPT

## 2023-05-11 PROCEDURE — 96415 CHEMO IV INFUSION ADDL HR: CPT

## 2023-05-11 RX ORDER — INFLIXIMAB 100 MG/10ML
100 INJECTION, POWDER, LYOPHILIZED, FOR SOLUTION INTRAVENOUS
Qty: 1 | Refills: 0 | Status: COMPLETED | OUTPATIENT
Start: 2023-05-04

## 2023-05-11 NOTE — HISTORY OF PRESENT ILLNESS
[N/A] : N/A [Denies] : Denies [No] : No [Yes] : Yes [Declined] : Declined [Informed consent documented in EHR.] : Informed consent documented in EHR. [Right upper extremity] : Right upper extremity [22g] : 22g [Start Time: ___] : Medication Start Time: [unfilled] [End Time: ___] : Medication End Time: [unfilled] [IV discontinued. Intact. No signs or symptoms of IV complications noted. Time: ___] : IV discontinued. Intact. No signs or symptoms of IV complications noted. Time: [unfilled] [Patient  instructed to seek medical attention with signs and symptoms of adverse effects] : Patient  instructed to seek medical attention with signs and symptoms of adverse effects [Patient left unit in no acute distress] : Patient left unit in no acute distress [Medications administered as ordered and tolerated well.] : Medications administered as ordered and tolerated well. [Blood drawn at time of visit] : Blood drawn at time of visit [de-identified] : [id number]

## 2023-05-12 ENCOUNTER — APPOINTMENT (OUTPATIENT)
Dept: INFUSION THERAPY | Facility: CLINIC | Age: 33
End: 2023-05-12

## 2023-05-12 LAB
ALBUMIN SERPL ELPH-MCNC: 4.6 G/DL
ALP BLD-CCNC: 65 U/L
ALT SERPL-CCNC: 21 U/L
ANION GAP SERPL CALC-SCNC: 9 MMOL/L
AST SERPL-CCNC: 19 U/L
BASOPHILS # BLD AUTO: 0.05 K/UL
BASOPHILS NFR BLD AUTO: 0.8 %
BILIRUB SERPL-MCNC: 0.4 MG/DL
BUN SERPL-MCNC: 16 MG/DL
CALCIUM SERPL-MCNC: 9.7 MG/DL
CHLORIDE SERPL-SCNC: 103 MMOL/L
CO2 SERPL-SCNC: 28 MMOL/L
CREAT SERPL-MCNC: 1.07 MG/DL
CRP SERPL-MCNC: <3 MG/L
EGFR: 93 ML/MIN/1.73M2
EOSINOPHIL # BLD AUTO: 0.44 K/UL
EOSINOPHIL NFR BLD AUTO: 7.3 %
GLUCOSE SERPL-MCNC: 100 MG/DL
HCT VFR BLD CALC: 46.4 %
HGB BLD-MCNC: 14.8 G/DL
IMM GRANULOCYTES NFR BLD AUTO: 0.3 %
LYMPHOCYTES # BLD AUTO: 1.84 K/UL
LYMPHOCYTES NFR BLD AUTO: 30.7 %
MAN DIFF?: NORMAL
MCHC RBC-ENTMCNC: 29.2 PG
MCHC RBC-ENTMCNC: 31.9 GM/DL
MCV RBC AUTO: 91.7 FL
MONOCYTES # BLD AUTO: 0.52 K/UL
MONOCYTES NFR BLD AUTO: 8.7 %
NEUTROPHILS # BLD AUTO: 3.13 K/UL
NEUTROPHILS NFR BLD AUTO: 52.2 %
PLATELET # BLD AUTO: 227 K/UL
POTASSIUM SERPL-SCNC: 4.9 MMOL/L
PROT SERPL-MCNC: 7.3 G/DL
RBC # BLD: 5.06 M/UL
RBC # FLD: 14.7 %
SODIUM SERPL-SCNC: 139 MMOL/L
WBC # FLD AUTO: 6 K/UL

## 2023-05-17 LAB
ANTIBODIES TO INFLIXIMAB (ATI) CONCENRTRATION: < 3.1 U/ML
PROMETHEUS ANSER IFX: NORMAL
PROMETHEUS LABORATORY FOOTER: NORMAL
SERUM INFLIXIMAB (IFX) CONCENTRATION: > 34 UG/ML

## 2023-06-09 ENCOUNTER — APPOINTMENT (OUTPATIENT)
Dept: INFUSION THERAPY | Facility: CLINIC | Age: 33
End: 2023-06-09

## 2023-06-15 ENCOUNTER — APPOINTMENT (OUTPATIENT)
Dept: RHEUMATOLOGY | Facility: CLINIC | Age: 33
End: 2023-06-15
Payer: COMMERCIAL

## 2023-06-15 ENCOUNTER — MED ADMIN CHARGE (OUTPATIENT)
Age: 33
End: 2023-06-15

## 2023-06-15 VITALS
SYSTOLIC BLOOD PRESSURE: 130 MMHG | OXYGEN SATURATION: 98 % | DIASTOLIC BLOOD PRESSURE: 83 MMHG | RESPIRATION RATE: 16 BRPM | TEMPERATURE: 97.3 F | HEART RATE: 68 BPM

## 2023-06-15 VITALS
OXYGEN SATURATION: 98 % | HEART RATE: 61 BPM | TEMPERATURE: 97.1 F | SYSTOLIC BLOOD PRESSURE: 127 MMHG | DIASTOLIC BLOOD PRESSURE: 81 MMHG | RESPIRATION RATE: 16 BRPM

## 2023-06-15 PROCEDURE — 36415 COLL VENOUS BLD VENIPUNCTURE: CPT

## 2023-06-15 PROCEDURE — 96413 CHEMO IV INFUSION 1 HR: CPT

## 2023-06-15 PROCEDURE — 96415 CHEMO IV INFUSION ADDL HR: CPT

## 2023-06-15 RX ORDER — INFLIXIMAB 100 MG/10ML
100 INJECTION, POWDER, LYOPHILIZED, FOR SOLUTION INTRAVENOUS
Qty: 1 | Refills: 0 | Status: COMPLETED | OUTPATIENT
Start: 2023-06-08

## 2023-06-15 NOTE — HISTORY OF PRESENT ILLNESS
[N/A] : N/A [Denies] : Denies [No] : No [Yes] : Yes [Declined] : Declined [Informed consent documented in EHR.] : Informed consent documented in EHR. [Left upper extremity] : Left upper extremity [22g] : 22g [Start Time: ___] : Medication Start Time: [unfilled] [End Time: ___] : Medication End Time: [unfilled] [IV discontinued. Intact. No signs or symptoms of IV complications noted. Time: ___] : IV discontinued. Intact. No signs or symptoms of IV complications noted. Time: [unfilled] [Patient  instructed to seek medical attention with signs and symptoms of adverse effects] : Patient  instructed to seek medical attention with signs and symptoms of adverse effects [Patient left unit in no acute distress] : Patient left unit in no acute distress [Medications administered as ordered and tolerated well.] : Medications administered as ordered and tolerated well. [Blood drawn at time of visit] : Blood drawn at time of visit [de-identified] : [id number]

## 2023-06-16 LAB
ALBUMIN SERPL ELPH-MCNC: 4.2 G/DL
ALP BLD-CCNC: 57 U/L
ALT SERPL-CCNC: 21 U/L
ANION GAP SERPL CALC-SCNC: 10 MMOL/L
AST SERPL-CCNC: 24 U/L
BILIRUB SERPL-MCNC: 0.5 MG/DL
BUN SERPL-MCNC: 13 MG/DL
CALCIUM SERPL-MCNC: 9.8 MG/DL
CHLORIDE SERPL-SCNC: 107 MMOL/L
CO2 SERPL-SCNC: 25 MMOL/L
CREAT SERPL-MCNC: 1.1 MG/DL
CRP SERPL-MCNC: <3 MG/L
EGFR: 90 ML/MIN/1.73M2
GLUCOSE SERPL-MCNC: 87 MG/DL
POTASSIUM SERPL-SCNC: 4.7 MMOL/L
PROT SERPL-MCNC: 6.5 G/DL
SODIUM SERPL-SCNC: 141 MMOL/L

## 2023-07-07 ENCOUNTER — MED ADMIN CHARGE (OUTPATIENT)
Age: 33
End: 2023-07-07

## 2023-07-07 ENCOUNTER — APPOINTMENT (OUTPATIENT)
Dept: RHEUMATOLOGY | Facility: CLINIC | Age: 33
End: 2023-07-07
Payer: COMMERCIAL

## 2023-07-07 VITALS
RESPIRATION RATE: 16 BRPM | OXYGEN SATURATION: 98 % | HEART RATE: 77 BPM | TEMPERATURE: 98 F | SYSTOLIC BLOOD PRESSURE: 127 MMHG | DIASTOLIC BLOOD PRESSURE: 80 MMHG

## 2023-07-07 VITALS
RESPIRATION RATE: 16 BRPM | DIASTOLIC BLOOD PRESSURE: 81 MMHG | SYSTOLIC BLOOD PRESSURE: 134 MMHG | TEMPERATURE: 98.2 F | HEART RATE: 71 BPM

## 2023-07-07 PROCEDURE — 36415 COLL VENOUS BLD VENIPUNCTURE: CPT

## 2023-07-07 PROCEDURE — 96413 CHEMO IV INFUSION 1 HR: CPT

## 2023-07-07 PROCEDURE — 96415 CHEMO IV INFUSION ADDL HR: CPT

## 2023-07-07 RX ORDER — INFLIXIMAB 100 MG/10ML
100 INJECTION, POWDER, LYOPHILIZED, FOR SOLUTION INTRAVENOUS
Qty: 1 | Refills: 0 | Status: COMPLETED | OUTPATIENT
Start: 2023-06-30

## 2023-07-07 NOTE — HISTORY OF PRESENT ILLNESS
[Denies] : Denies [No] : No [Yes] : Yes [Declined] : Declined [Informed consent documented in EHR.] : Informed consent documented in EHR. [Right upper extremity] : Right upper extremity [22g] : 22g [Start Time: ___] : Medication Start Time: [unfilled] [End Time: ___] : Medication End Time: [unfilled] [IV discontinued. Intact. No signs or symptoms of IV complications noted. Time: ___] : IV discontinued. Intact. No signs or symptoms of IV complications noted. Time: [unfilled] [Patient  instructed to seek medical attention with signs and symptoms of adverse effects] : Patient  instructed to seek medical attention with signs and symptoms of adverse effects [Patient left unit in no acute distress] : Patient left unit in no acute distress [Medications administered as ordered and tolerated well.] : Medications administered as ordered and tolerated well. [Blood drawn at time of visit] : Blood drawn at time of visit [de-identified] : [id number]

## 2023-07-10 LAB
ALBUMIN SERPL ELPH-MCNC: 4.1 G/DL
ALP BLD-CCNC: 56 U/L
ALT SERPL-CCNC: 24 U/L
ANION GAP SERPL CALC-SCNC: 10 MMOL/L
AST SERPL-CCNC: 22 U/L
BILIRUB SERPL-MCNC: 0.4 MG/DL
BUN SERPL-MCNC: 10 MG/DL
CALCIUM SERPL-MCNC: 9.8 MG/DL
CHLORIDE SERPL-SCNC: 104 MMOL/L
CO2 SERPL-SCNC: 25 MMOL/L
CREAT SERPL-MCNC: 1.05 MG/DL
CRP SERPL-MCNC: <3 MG/L
EGFR: 95 ML/MIN/1.73M2
GLUCOSE SERPL-MCNC: 110 MG/DL
POTASSIUM SERPL-SCNC: 4.9 MMOL/L
PROT SERPL-MCNC: 6.6 G/DL
SODIUM SERPL-SCNC: 139 MMOL/L

## 2023-08-04 ENCOUNTER — MED ADMIN CHARGE (OUTPATIENT)
Age: 33
End: 2023-08-04

## 2023-08-04 ENCOUNTER — APPOINTMENT (OUTPATIENT)
Dept: RHEUMATOLOGY | Facility: CLINIC | Age: 33
End: 2023-08-04
Payer: COMMERCIAL

## 2023-08-04 VITALS
OXYGEN SATURATION: 99 % | HEART RATE: 70 BPM | DIASTOLIC BLOOD PRESSURE: 74 MMHG | RESPIRATION RATE: 16 BRPM | SYSTOLIC BLOOD PRESSURE: 120 MMHG | TEMPERATURE: 98.3 F

## 2023-08-04 PROCEDURE — 36415 COLL VENOUS BLD VENIPUNCTURE: CPT

## 2023-08-04 PROCEDURE — 96415 CHEMO IV INFUSION ADDL HR: CPT

## 2023-08-04 PROCEDURE — 96413 CHEMO IV INFUSION 1 HR: CPT

## 2023-08-04 RX ORDER — INFLIXIMAB 100 MG/10ML
100 INJECTION, POWDER, LYOPHILIZED, FOR SOLUTION INTRAVENOUS
Qty: 1 | Refills: 0 | Status: COMPLETED | OUTPATIENT
Start: 2023-07-27

## 2023-08-04 NOTE — HISTORY OF PRESENT ILLNESS
[N/A] : N/A [Denies] : Denies [No] : No [Yes] : Yes [Declined] : Declined [Informed consent documented in EHR.] : Informed consent documented in EHR. [Left upper extremity] : Left upper extremity [22g] : 22g [Start Time: ___] : Medication Start Time: [unfilled] [End Time: ___] : Medication End Time: [unfilled] [IV discontinued. Intact. No signs or symptoms of IV complications noted. Time: ___] : IV discontinued. Intact. No signs or symptoms of IV complications noted. Time: [unfilled] [Patient  instructed to seek medical attention with signs and symptoms of adverse effects] : Patient  instructed to seek medical attention with signs and symptoms of adverse effects [Patient left unit in no acute distress] : Patient left unit in no acute distress [Medications administered as ordered and tolerated well.] : Medications administered as ordered and tolerated well. [Blood drawn at time of visit] : Blood drawn at time of visit [de-identified] : [id number]

## 2023-08-07 LAB
ALBUMIN SERPL ELPH-MCNC: 4 G/DL
ALP BLD-CCNC: 55 U/L
ALT SERPL-CCNC: 26 U/L
ANION GAP SERPL CALC-SCNC: 12 MMOL/L
AST SERPL-CCNC: 21 U/L
BILIRUB SERPL-MCNC: <0.2 MG/DL
BUN SERPL-MCNC: 12 MG/DL
CALCIUM SERPL-MCNC: 9.2 MG/DL
CHLORIDE SERPL-SCNC: 106 MMOL/L
CO2 SERPL-SCNC: 23 MMOL/L
CREAT SERPL-MCNC: 1.04 MG/DL
CRP SERPL-MCNC: <3 MG/L
EGFR: 96 ML/MIN/1.73M2
GLUCOSE SERPL-MCNC: 100 MG/DL
POTASSIUM SERPL-SCNC: 4.9 MMOL/L
PROT SERPL-MCNC: 6.4 G/DL
SODIUM SERPL-SCNC: 141 MMOL/L

## 2023-09-01 ENCOUNTER — MED ADMIN CHARGE (OUTPATIENT)
Age: 33
End: 2023-09-01

## 2023-09-01 ENCOUNTER — APPOINTMENT (OUTPATIENT)
Dept: RHEUMATOLOGY | Facility: CLINIC | Age: 33
End: 2023-09-01
Payer: COMMERCIAL

## 2023-09-01 VITALS
SYSTOLIC BLOOD PRESSURE: 140 MMHG | DIASTOLIC BLOOD PRESSURE: 84 MMHG | HEART RATE: 70 BPM | OXYGEN SATURATION: 100 % | TEMPERATURE: 98.3 F | RESPIRATION RATE: 16 BRPM

## 2023-09-01 VITALS
DIASTOLIC BLOOD PRESSURE: 80 MMHG | HEART RATE: 88 BPM | OXYGEN SATURATION: 100 % | SYSTOLIC BLOOD PRESSURE: 131 MMHG | RESPIRATION RATE: 16 BRPM | TEMPERATURE: 98.4 F

## 2023-09-01 PROCEDURE — 96415 CHEMO IV INFUSION ADDL HR: CPT

## 2023-09-01 PROCEDURE — 96413 CHEMO IV INFUSION 1 HR: CPT

## 2023-09-01 PROCEDURE — 36415 COLL VENOUS BLD VENIPUNCTURE: CPT

## 2023-09-01 RX ORDER — INFLIXIMAB 100 MG/10ML
100 INJECTION, POWDER, LYOPHILIZED, FOR SOLUTION INTRAVENOUS
Qty: 1 | Refills: 0 | Status: COMPLETED | OUTPATIENT
Start: 2023-08-25

## 2023-09-01 NOTE — HISTORY OF PRESENT ILLNESS
[N/A] : N/A [Denies] : Denies [No] : No [Yes] : Yes [Declined] : Declined [Informed consent documented in EHR.] : Informed consent documented in EHR. [Left upper extremity] : Left upper extremity [22g] : 22g [Start Time: ___] : Medication Start Time: [unfilled] [End Time: ___] : Medication End Time: [unfilled] [IV discontinued. Intact. No signs or symptoms of IV complications noted. Time: ___] : IV discontinued. Intact. No signs or symptoms of IV complications noted. Time: [unfilled] [Patient  instructed to seek medical attention with signs and symptoms of adverse effects] : Patient  instructed to seek medical attention with signs and symptoms of adverse effects [Patient left unit in no acute distress] : Patient left unit in no acute distress [Medications administered as ordered and tolerated well.] : Medications administered as ordered and tolerated well. [Blood drawn at time of visit] : Blood drawn at time of visit [de-identified] : LFA under elbow [de-identified] : [id number]

## 2023-09-05 LAB
ALBUMIN SERPL ELPH-MCNC: 4.5 G/DL
ALP BLD-CCNC: 55 U/L
ALT SERPL-CCNC: 22 U/L
ANION GAP SERPL CALC-SCNC: 12 MMOL/L
AST SERPL-CCNC: 43 U/L
BILIRUB SERPL-MCNC: 0.6 MG/DL
BUN SERPL-MCNC: 15 MG/DL
CALCIUM SERPL-MCNC: 9.8 MG/DL
CHLORIDE SERPL-SCNC: 105 MMOL/L
CO2 SERPL-SCNC: 22 MMOL/L
CREAT SERPL-MCNC: 1.09 MG/DL
CRP SERPL-MCNC: <3 MG/L
EGFR: 91 ML/MIN/1.73M2
GLUCOSE SERPL-MCNC: 103 MG/DL
POTASSIUM SERPL-SCNC: 5.4 MMOL/L
PROT SERPL-MCNC: 7 G/DL
SODIUM SERPL-SCNC: 140 MMOL/L

## 2023-09-28 ENCOUNTER — MED ADMIN CHARGE (OUTPATIENT)
Age: 33
End: 2023-09-28

## 2023-09-28 ENCOUNTER — APPOINTMENT (OUTPATIENT)
Dept: RHEUMATOLOGY | Facility: CLINIC | Age: 33
End: 2023-09-28
Payer: COMMERCIAL

## 2023-09-28 VITALS
DIASTOLIC BLOOD PRESSURE: 74 MMHG | SYSTOLIC BLOOD PRESSURE: 135 MMHG | TEMPERATURE: 98.1 F | HEART RATE: 69 BPM | RESPIRATION RATE: 16 BRPM | OXYGEN SATURATION: 100 %

## 2023-09-28 VITALS
OXYGEN SATURATION: 99 % | DIASTOLIC BLOOD PRESSURE: 80 MMHG | SYSTOLIC BLOOD PRESSURE: 136 MMHG | RESPIRATION RATE: 16 BRPM | TEMPERATURE: 98 F | HEART RATE: 71 BPM

## 2023-09-28 PROCEDURE — 96415 CHEMO IV INFUSION ADDL HR: CPT

## 2023-09-28 PROCEDURE — 96413 CHEMO IV INFUSION 1 HR: CPT

## 2023-09-28 RX ORDER — INFLIXIMAB 100 MG/10ML
100 INJECTION, POWDER, LYOPHILIZED, FOR SOLUTION INTRAVENOUS
Qty: 1 | Refills: 0 | Status: COMPLETED | OUTPATIENT
Start: 2023-09-21

## 2023-09-29 LAB
ALBUMIN SERPL ELPH-MCNC: 4.3 G/DL
ALP BLD-CCNC: 62 U/L
ALT SERPL-CCNC: 26 U/L
ANION GAP SERPL CALC-SCNC: 12 MMOL/L
AST SERPL-CCNC: 22 U/L
BASOPHILS # BLD AUTO: 0.05 K/UL
BASOPHILS NFR BLD AUTO: 0.7 %
BILIRUB SERPL-MCNC: 0.4 MG/DL
BUN SERPL-MCNC: 10 MG/DL
CALCIUM SERPL-MCNC: 10 MG/DL
CHLORIDE SERPL-SCNC: 102 MMOL/L
CO2 SERPL-SCNC: 26 MMOL/L
CREAT SERPL-MCNC: 1.01 MG/DL
CRP SERPL-MCNC: <3 MG/L
EGFR: 99 ML/MIN/1.73M2
EOSINOPHIL # BLD AUTO: 0.16 K/UL
EOSINOPHIL NFR BLD AUTO: 2.2 %
GLUCOSE SERPL-MCNC: 108 MG/DL
HCT VFR BLD CALC: 44.1 %
HGB BLD-MCNC: 15.1 G/DL
IMM GRANULOCYTES NFR BLD AUTO: 0.4 %
LYMPHOCYTES # BLD AUTO: 2.28 K/UL
LYMPHOCYTES NFR BLD AUTO: 31.8 %
MAN DIFF?: NORMAL
MCHC RBC-ENTMCNC: 32.1 PG
MCHC RBC-ENTMCNC: 34.2 GM/DL
MCV RBC AUTO: 93.6 FL
MONOCYTES # BLD AUTO: 0.72 K/UL
MONOCYTES NFR BLD AUTO: 10.1 %
NEUTROPHILS # BLD AUTO: 3.92 K/UL
NEUTROPHILS NFR BLD AUTO: 54.8 %
PLATELET # BLD AUTO: 208 K/UL
POTASSIUM SERPL-SCNC: 4.4 MMOL/L
PROT SERPL-MCNC: 6.8 G/DL
RBC # BLD: 4.71 M/UL
RBC # FLD: 13.4 %
SODIUM SERPL-SCNC: 139 MMOL/L
WBC # FLD AUTO: 7.16 K/UL

## 2023-10-16 ENCOUNTER — APPOINTMENT (OUTPATIENT)
Dept: INTERNAL MEDICINE | Facility: CLINIC | Age: 33
End: 2023-10-16
Payer: COMMERCIAL

## 2023-10-16 VITALS
DIASTOLIC BLOOD PRESSURE: 95 MMHG | HEIGHT: 71 IN | SYSTOLIC BLOOD PRESSURE: 148 MMHG | WEIGHT: 181 LBS | HEART RATE: 72 BPM | BODY MASS INDEX: 25.34 KG/M2 | TEMPERATURE: 97.2 F | OXYGEN SATURATION: 98 %

## 2023-10-16 DIAGNOSIS — K56.1 INTUSSUSCEPTION: ICD-10-CM

## 2023-10-16 DIAGNOSIS — Z87.19 PERSONAL HISTORY OF OTHER DISEASES OF THE DIGESTIVE SYSTEM: ICD-10-CM

## 2023-10-16 DIAGNOSIS — R03.0 ELEVATED BLOOD-PRESSURE READING, W/OUT DIAGNOSIS OF HYPERTENSION: ICD-10-CM

## 2023-10-16 DIAGNOSIS — Z23 ENCOUNTER FOR IMMUNIZATION: ICD-10-CM

## 2023-10-16 PROCEDURE — G0008: CPT

## 2023-10-16 PROCEDURE — 90686 IIV4 VACC NO PRSV 0.5 ML IM: CPT

## 2023-10-16 PROCEDURE — 99385 PREV VISIT NEW AGE 18-39: CPT | Mod: 25

## 2023-10-16 PROCEDURE — 36415 COLL VENOUS BLD VENIPUNCTURE: CPT

## 2023-10-16 RX ORDER — SERTRALINE HYDROCHLORIDE 100 MG/1
100 TABLET, FILM COATED ORAL
Refills: 0 | Status: DISCONTINUED | COMMUNITY
End: 2023-10-16

## 2023-10-16 RX ORDER — SERTRALINE HYDROCHLORIDE 50 MG/1
50 TABLET, FILM COATED ORAL
Refills: 0 | Status: DISCONTINUED | COMMUNITY
End: 2023-10-16

## 2023-10-21 NOTE — PATIENT PROFILE ADULT. - NS PRO ABUSE SCREEN SUSPICION NEGLECT YN
Care Management Initial Consult    General Information  Assessment completed with: Patient,    Type of CM/SW Visit: Initial Assessment    Primary Care Provider verified and updated as needed: Yes   Readmission within the last 30 days: no previous admission in last 30 days        Communication Assessment  Patient's communication style: spoken language (English or Bilingual)    Hearing Difficulty or Deaf: no   Wear Glasses or Blind: yes    Cognitive  Cognitive/Neuro/Behavioral: WDL  Level of Consciousness: intermittent confusion     Orientation: oriented x 4  Mood/Behavior: anxious  Best Language: 0 - No aphasia  Speech: other (see comments) (word finding difficulty, struggling to finish sentences)    Living Environment:   People in home: child(shady), adult  [facility]  Current living Arrangements: house      Able to return to prior arrangements: yes       Family/Social Support:  Care provided by: self  Provides care for: no one     Children          Description of Support System: Supportive, Involved         Current Resources:   Patient receiving home care services: No     Community Resources: None  Equipment currently used at home: none  Supplies currently used at home: None    Employment/Financial:  Employment Status: retired          Does the patient's insurance plan have a 3 day qualifying hospital stay waiver?  No    Lifestyle & Psychosocial Needs:  Social Determinants of Health     Food Insecurity: Not on file   Depression: Not on file   Housing Stability: Not on file   Tobacco Use: High Risk (10/20/2023)    Patient History     Smoking Tobacco Use: Some Days     Smokeless Tobacco Use: Never     Passive Exposure: Not on file   Financial Resource Strain: Not on file   Alcohol Use: Not on file   Transportation Needs: Not on file   Physical Activity: Not on file   Interpersonal Safety: Not on file   Stress: Not on file   Social Connections: Not on file       Functional Status:  Prior to admission patient needed  assistance:   Dependent ADLs:: Independent  Dependent IADLs:: Independent           Additional Information:    Assessment completed with patient. Son-in-law Kenny present in room.  Patient reports he lives with his daughter Yoko in [place]. He is here visiting family. He is independent with ADLs and IADLs, ambulates without devices and drives.  He is planning on returning to his family house here then flying back to [place].  Family will transport.        Hope Leigh RN       no

## 2023-10-25 ENCOUNTER — APPOINTMENT (OUTPATIENT)
Dept: RHEUMATOLOGY | Facility: CLINIC | Age: 33
End: 2023-10-25

## 2023-10-25 VITALS
DIASTOLIC BLOOD PRESSURE: 108 MMHG | SYSTOLIC BLOOD PRESSURE: 170 MMHG | OXYGEN SATURATION: 99 % | RESPIRATION RATE: 16 BRPM | TEMPERATURE: 98.1 F | HEART RATE: 136 BPM

## 2023-10-25 VITALS
OXYGEN SATURATION: 99 % | SYSTOLIC BLOOD PRESSURE: 169 MMHG | DIASTOLIC BLOOD PRESSURE: 104 MMHG | HEART RATE: 118 BPM | RESPIRATION RATE: 16 BRPM

## 2023-11-01 ENCOUNTER — APPOINTMENT (OUTPATIENT)
Dept: RHEUMATOLOGY | Facility: CLINIC | Age: 33
End: 2023-11-01

## 2023-11-01 ENCOUNTER — MED ADMIN CHARGE (OUTPATIENT)
Age: 33
End: 2023-11-01

## 2023-11-01 ENCOUNTER — APPOINTMENT (OUTPATIENT)
Dept: RHEUMATOLOGY | Facility: CLINIC | Age: 33
End: 2023-11-01
Payer: COMMERCIAL

## 2023-11-01 VITALS
HEART RATE: 98 BPM | OXYGEN SATURATION: 98 % | TEMPERATURE: 98.3 F | SYSTOLIC BLOOD PRESSURE: 148 MMHG | DIASTOLIC BLOOD PRESSURE: 89 MMHG | RESPIRATION RATE: 16 BRPM

## 2023-11-01 VITALS
RESPIRATION RATE: 14 BRPM | TEMPERATURE: 98.6 F | OXYGEN SATURATION: 98 % | HEART RATE: 67 BPM | SYSTOLIC BLOOD PRESSURE: 125 MMHG | DIASTOLIC BLOOD PRESSURE: 80 MMHG

## 2023-11-01 PROCEDURE — 96413 CHEMO IV INFUSION 1 HR: CPT

## 2023-11-01 PROCEDURE — 96415 CHEMO IV INFUSION ADDL HR: CPT

## 2023-11-01 PROCEDURE — 36415 COLL VENOUS BLD VENIPUNCTURE: CPT

## 2023-11-01 RX ORDER — INFLIXIMAB 100 MG/10ML
100 INJECTION, POWDER, LYOPHILIZED, FOR SOLUTION INTRAVENOUS
Qty: 1 | Refills: 0 | Status: COMPLETED | OUTPATIENT
Start: 2023-10-23

## 2023-11-02 ENCOUNTER — MED ADMIN CHARGE (OUTPATIENT)
Age: 33
End: 2023-11-02

## 2023-11-02 ENCOUNTER — APPOINTMENT (OUTPATIENT)
Dept: GASTROENTEROLOGY | Facility: CLINIC | Age: 33
End: 2023-11-02
Payer: COMMERCIAL

## 2023-11-02 VITALS
HEIGHT: 71 IN | SYSTOLIC BLOOD PRESSURE: 136 MMHG | HEART RATE: 73 BPM | TEMPERATURE: 97.6 F | DIASTOLIC BLOOD PRESSURE: 83 MMHG | BODY MASS INDEX: 25.9 KG/M2 | WEIGHT: 185 LBS | OXYGEN SATURATION: 99 %

## 2023-11-02 DIAGNOSIS — B00.9 HERPESVIRAL INFECTION, UNSPECIFIED: ICD-10-CM

## 2023-11-02 LAB — CRP SERPL-MCNC: <3 MG/L

## 2023-11-02 PROCEDURE — 96372 THER/PROPH/DIAG INJ SC/IM: CPT

## 2023-11-02 PROCEDURE — 36415 COLL VENOUS BLD VENIPUNCTURE: CPT

## 2023-11-02 PROCEDURE — 99215 OFFICE O/P EST HI 40 MIN: CPT | Mod: 25

## 2023-11-02 RX ORDER — CYANOCOBALAMIN 500 UG/1
500 SPRAY NASAL
Qty: 1 | Refills: 3 | Status: ACTIVE | COMMUNITY
Start: 2023-11-02 | End: 1900-01-01

## 2023-11-02 RX ORDER — CYANOCOBALAMIN 1000 UG/ML
1000 INJECTION INTRAMUSCULAR; SUBCUTANEOUS
Qty: 0 | Refills: 0 | Status: COMPLETED | OUTPATIENT
Start: 2023-11-02

## 2023-11-02 RX ORDER — CLONAZEPAM 0.5 MG
0.5 TABLET,DISINTEGRATING ORAL
Refills: 0 | Status: ACTIVE | COMMUNITY

## 2023-11-02 RX ADMIN — CYANOCOBALAMIN 0 MCG/ML: 1000 INJECTION INTRAMUSCULAR; SUBCUTANEOUS at 00:00

## 2023-11-03 ENCOUNTER — TRANSCRIPTION ENCOUNTER (OUTPATIENT)
Age: 33
End: 2023-11-03

## 2023-11-03 PROBLEM — B00.9 HERPES SIMPLEX: Status: ACTIVE | Noted: 2019-01-16

## 2023-11-03 LAB
25(OH)D3 SERPL-MCNC: 34 NG/ML
ALBUMIN SERPL ELPH-MCNC: 4.3 G/DL
ALP BLD-CCNC: 65 U/L
ALT SERPL-CCNC: 19 U/L
ANION GAP SERPL CALC-SCNC: 12 MMOL/L
AST SERPL-CCNC: 15 U/L
BASOPHILS # BLD AUTO: 0.04 K/UL
BASOPHILS NFR BLD AUTO: 0.5 %
BILIRUB SERPL-MCNC: 0.3 MG/DL
BUN SERPL-MCNC: 15 MG/DL
CALCIUM SERPL-MCNC: 9.3 MG/DL
CHLORIDE SERPL-SCNC: 101 MMOL/L
CHOLEST SERPL-MCNC: 125 MG/DL
CO2 SERPL-SCNC: 22 MMOL/L
CREAT SERPL-MCNC: 1.02 MG/DL
EGFR: 98 ML/MIN/1.73M2
EOSINOPHIL # BLD AUTO: 0.11 K/UL
EOSINOPHIL NFR BLD AUTO: 1.5 %
ESTIMATED AVERAGE GLUCOSE: 97 MG/DL
FERRITIN SERPL-MCNC: 25 NG/ML
FOLATE SERPL-MCNC: 11.9 NG/ML
GLUCOSE SERPL-MCNC: 108 MG/DL
HBA1C MFR BLD HPLC: 5 %
HCT VFR BLD CALC: 43.8 %
HDLC SERPL-MCNC: 52 MG/DL
HGB BLD-MCNC: 15.3 G/DL
IMM GRANULOCYTES NFR BLD AUTO: 0.3 %
IRON SATN MFR SERPL: 14 %
IRON SERPL-MCNC: 64 UG/DL
LDLC SERPL CALC-MCNC: 47 MG/DL
LYMPHOCYTES # BLD AUTO: 2.04 K/UL
LYMPHOCYTES NFR BLD AUTO: 27.2 %
MAN DIFF?: NORMAL
MCHC RBC-ENTMCNC: 31.5 PG
MCHC RBC-ENTMCNC: 34.9 GM/DL
MCV RBC AUTO: 90.1 FL
MONOCYTES # BLD AUTO: 0.69 K/UL
MONOCYTES NFR BLD AUTO: 9.2 %
NEUTROPHILS # BLD AUTO: 4.59 K/UL
NEUTROPHILS NFR BLD AUTO: 61.3 %
NONHDLC SERPL-MCNC: 72 MG/DL
PLATELET # BLD AUTO: 239 K/UL
POTASSIUM SERPL-SCNC: 3.9 MMOL/L
PROT SERPL-MCNC: 6.9 G/DL
RBC # BLD: 4.86 M/UL
RBC # FLD: 13 %
SODIUM SERPL-SCNC: 136 MMOL/L
TIBC SERPL-MCNC: 454 UG/DL
TRIGL SERPL-MCNC: 147 MG/DL
TSH SERPL-ACNC: 2.33 UIU/ML
UIBC SERPL-MCNC: 390 UG/DL
VIT B12 SERPL-MCNC: 240 PG/ML
WBC # FLD AUTO: 7.49 K/UL

## 2023-11-06 ENCOUNTER — TRANSCRIPTION ENCOUNTER (OUTPATIENT)
Age: 33
End: 2023-11-06

## 2023-11-09 ENCOUNTER — APPOINTMENT (OUTPATIENT)
Dept: GASTROENTEROLOGY | Facility: CLINIC | Age: 33
End: 2023-11-09
Payer: COMMERCIAL

## 2023-11-09 PROCEDURE — 97803 MED NUTRITION INDIV SUBSEQ: CPT | Mod: 95

## 2023-11-28 ENCOUNTER — RESULT REVIEW (OUTPATIENT)
Age: 33
End: 2023-11-28

## 2023-11-28 ENCOUNTER — TRANSCRIPTION ENCOUNTER (OUTPATIENT)
Age: 33
End: 2023-11-28

## 2023-11-28 ENCOUNTER — APPOINTMENT (OUTPATIENT)
Dept: GASTROENTEROLOGY | Facility: HOSPITAL | Age: 33
End: 2023-11-28

## 2023-11-28 ENCOUNTER — OUTPATIENT (OUTPATIENT)
Dept: OUTPATIENT SERVICES | Facility: HOSPITAL | Age: 33
LOS: 1 days | Discharge: HOME | End: 2023-11-28
Payer: COMMERCIAL

## 2023-11-28 VITALS
HEIGHT: 71 IN | RESPIRATION RATE: 23 BRPM | SYSTOLIC BLOOD PRESSURE: 153 MMHG | TEMPERATURE: 95 F | HEART RATE: 102 BPM | DIASTOLIC BLOOD PRESSURE: 101 MMHG | OXYGEN SATURATION: 98 %

## 2023-11-28 DIAGNOSIS — Z98.890 OTHER SPECIFIED POSTPROCEDURAL STATES: Chronic | ICD-10-CM

## 2023-11-28 PROCEDURE — 88305 TISSUE EXAM BY PATHOLOGIST: CPT

## 2023-11-28 PROCEDURE — C1889: CPT

## 2023-11-28 PROCEDURE — 45380 COLONOSCOPY AND BIOPSY: CPT

## 2023-11-28 PROCEDURE — 45382 COLONOSCOPY W/CONTROL BLEED: CPT | Mod: 59

## 2023-11-28 PROCEDURE — 88305 TISSUE EXAM BY PATHOLOGIST: CPT | Mod: 26

## 2023-11-28 DEVICE — CLIP RESOLUTION 360 ULTRA 235CM 20/BX: Type: IMPLANTABLE DEVICE | Status: FUNCTIONAL

## 2023-11-29 LAB
SURGICAL PATHOLOGY STUDY: SIGNIFICANT CHANGE UP
SURGICAL PATHOLOGY STUDY: SIGNIFICANT CHANGE UP

## 2023-12-04 ENCOUNTER — APPOINTMENT (OUTPATIENT)
Dept: RHEUMATOLOGY | Facility: CLINIC | Age: 33
End: 2023-12-04
Payer: COMMERCIAL

## 2023-12-04 ENCOUNTER — MED ADMIN CHARGE (OUTPATIENT)
Age: 33
End: 2023-12-04

## 2023-12-04 VITALS
RESPIRATION RATE: 14 BRPM | OXYGEN SATURATION: 98 % | SYSTOLIC BLOOD PRESSURE: 127 MMHG | HEART RATE: 63 BPM | TEMPERATURE: 98.5 F | DIASTOLIC BLOOD PRESSURE: 78 MMHG

## 2023-12-04 VITALS
DIASTOLIC BLOOD PRESSURE: 92 MMHG | SYSTOLIC BLOOD PRESSURE: 135 MMHG | TEMPERATURE: 98.3 F | RESPIRATION RATE: 16 BRPM | OXYGEN SATURATION: 97 % | HEART RATE: 76 BPM

## 2023-12-04 PROCEDURE — 96415 CHEMO IV INFUSION ADDL HR: CPT

## 2023-12-04 PROCEDURE — 96413 CHEMO IV INFUSION 1 HR: CPT

## 2023-12-04 PROCEDURE — 36415 COLL VENOUS BLD VENIPUNCTURE: CPT

## 2023-12-04 RX ORDER — CYANOCOBALAMIN 1000 UG/ML
1000 INJECTION INTRAMUSCULAR; SUBCUTANEOUS
Qty: 0 | Refills: 0 | Status: COMPLETED | OUTPATIENT
Start: 2023-12-04

## 2023-12-04 RX ORDER — INFLIXIMAB 100 MG/10ML
100 INJECTION, POWDER, LYOPHILIZED, FOR SOLUTION INTRAVENOUS
Qty: 1 | Refills: 0 | Status: COMPLETED | OUTPATIENT
Start: 2023-11-30

## 2023-12-05 LAB
ALBUMIN SERPL ELPH-MCNC: 4.3 G/DL
ALP BLD-CCNC: 68 U/L
ALT SERPL-CCNC: 28 U/L
ANION GAP SERPL CALC-SCNC: 13 MMOL/L
AST SERPL-CCNC: 33 U/L
BASOPHILS # BLD AUTO: 0.06 K/UL
BASOPHILS NFR BLD AUTO: 0.9 %
BILIRUB SERPL-MCNC: 0.4 MG/DL
BUN SERPL-MCNC: 10 MG/DL
CALCIUM SERPL-MCNC: 8.8 MG/DL
CHLORIDE SERPL-SCNC: 105 MMOL/L
CO2 SERPL-SCNC: 22 MMOL/L
CREAT SERPL-MCNC: 0.97 MG/DL
CRP SERPL-MCNC: <3 MG/L
EGFR: 104 ML/MIN/1.73M2
EOSINOPHIL # BLD AUTO: 0.19 K/UL
EOSINOPHIL NFR BLD AUTO: 2.7 %
GLUCOSE SERPL-MCNC: 131 MG/DL
HCT VFR BLD CALC: 44.9 %
HGB BLD-MCNC: 15.2 G/DL
IMM GRANULOCYTES NFR BLD AUTO: 0.4 %
LYMPHOCYTES # BLD AUTO: 1.85 K/UL
LYMPHOCYTES NFR BLD AUTO: 26.7 %
MAN DIFF?: NORMAL
MCHC RBC-ENTMCNC: 30.2 PG
MCHC RBC-ENTMCNC: 33.9 GM/DL
MCV RBC AUTO: 89.3 FL
MONOCYTES # BLD AUTO: 0.61 K/UL
MONOCYTES NFR BLD AUTO: 8.8 %
NEUTROPHILS # BLD AUTO: 4.19 K/UL
NEUTROPHILS NFR BLD AUTO: 60.5 %
PLATELET # BLD AUTO: 237 K/UL
POTASSIUM SERPL-SCNC: 4.2 MMOL/L
PROT SERPL-MCNC: 6.8 G/DL
RBC # BLD: 5.03 M/UL
RBC # FLD: 12.8 %
SODIUM SERPL-SCNC: 139 MMOL/L
WBC # FLD AUTO: 6.93 K/UL

## 2023-12-08 ENCOUNTER — APPOINTMENT (OUTPATIENT)
Dept: GASTROENTEROLOGY | Facility: CLINIC | Age: 33
End: 2023-12-08
Payer: COMMERCIAL

## 2023-12-08 DIAGNOSIS — Z90.49 ACQUIRED ABSENCE OF OTHER SPECIFIED PARTS OF DIGESTIVE TRACT: ICD-10-CM

## 2023-12-08 PROCEDURE — 97803 MED NUTRITION INDIV SUBSEQ: CPT | Mod: 95

## 2023-12-13 LAB
INFLIXIMAB AB SERPL-MCNC: <22 NG/ML
INFLIXIMAB SERPL-MCNC: 26 UG/ML

## 2023-12-14 ENCOUNTER — APPOINTMENT (OUTPATIENT)
Dept: GASTROENTEROLOGY | Facility: CLINIC | Age: 33
End: 2023-12-14
Payer: COMMERCIAL

## 2023-12-14 DIAGNOSIS — K50.90 CROHN'S DISEASE, UNSPECIFIED, W/OUT COMPLICATIONS: ICD-10-CM

## 2023-12-14 PROCEDURE — 99213 OFFICE O/P EST LOW 20 MIN: CPT | Mod: 95

## 2023-12-15 PROBLEM — K50.90 CROHN DISEASE: Status: ACTIVE | Noted: 2018-05-23

## 2023-12-15 NOTE — ASSESSMENT
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 Here today c/o mild recurrent abd pain in the last few months, now s/p cscope.   # CD s/p SBR for multiple unrelated reasons? - improvement on cscope, anastomosis remains, hemoclips place - cont trend h/h with infusions and his hematologist  - advised hyperbaric chamber for non-healing anastomotic ulcer which has ischemic component; however pt has been out of work since February, has not been able to schedule due to insurance lapses/person life stressors  - Continue with IFX 10 mg/kg q4 weeks, drug levels remained therapeutic with no ab  - Continue to monitor drug levels which are > 34 with no antibodies - reviewed labs, normal CRP and Hgb - plan for repeat CSCOPE in 1 year  #Persistent iron def anemia- improved s/p Fe infusion last summer however now iron decreasing again; stable hemoglobin, most recently iron sat 14% ferritin 25  - cont iron infusions PRN with Heme (about once/year) - plan for anastomotic ulcer clipping with next CSCOPE   #B12 anemia - cont b12 monthly with infusions given risk for low levels - cont nascobal weekly   #Increased anxiety - cont f/u Psych - cont Escitalopram, Klonopin PRN   # Bile acid diarrhea - c/w cholestyramine PRN, titrate to 1-2 BMs daily (asked him to consider taking with his largest meals)  #HSV - valacyclovir 1GM PRN due to recurrent HSV mouth sores  #HCM - seldom smoking Cigarettes, (1 cigarette per month); cessation counseling provided  - + marijuana use recreationally 3x/week - emphasized importance of NSAID avoidance, denies use currently - denies depression - sees therapist regularly - S/p COVID vaccine and booster - emphasized importance of tobacco, cocaine, and alcohol avoidance - immune to Hep B - TB negative 2018 - pending derm FBSE Jan 2023 - declines flu shot - getting  next Summer   F/U 6 mo, sooner if indicated

## 2023-12-15 NOTE — HISTORY OF PRESENT ILLNESS
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    CSCOPE: Additional findings Overall the colonoscopy was unremarkable to the to the level  of the ileocolonic anastomosis. There was a friable anastomotic ulcer covering  1/3rd of circumference. The neoterminal ileum appeared unremarkable (improved  from prior). 3 Hemoclips were applied to the ulcer with the intention of  initiating remodeling leading to ulcer healing..One endoclip was successfully  applied for the purpose of hemostasis.    Impressions:    Overall the colonoscopy was unremarkable to the to the level of the  ileocolonic anastomosis. There was a friable anastomotic ulcer covering 1/3rd of  circumference. The neoterminal ileum appeared unremarkable (improved from  prior). 3 Hemoclips were applied to the ulcer with the intention of initiating  remodeling leading to ulcer healing. (Endoclip).    Plan:    Continue current medical regimen.    Follow-up office visit after procedure completed.  Follow H/H and iron  replacement requirements.   Interval hx: Pt reports he's noticed a little more abd pain in the last few months. Certain foods can be triggers. Some fatigue as well. Thinks pain is more days than not. No nausea/vomiting. No fevers/chills. No weight loss, stable and steady weight gain. Having 2 BMs per day, nonbloody , no black stool.  Of note has also had more anxiety/panic attacks around medical visits/procedures. He recently had near panic attack and infusion center last week and infusion was cancelled. But after speaking to his psych, he was given Klonopin 0.5mg PRN and he took a dose before his infusion yesterday and the infusion proceeded without incident. He reports feeling much better and is happy to continue current treatment.   HPI: Pt reports that he was doing well until early July 2022 when he had an episode of 2 BM with BRBPR which resolved spontaneously. Since that time he's been having 1-2 formed BM daily (on cholestyramine) with no blood or mucous. Denies urgency or rectal pain. Previous colonoscopy as below with healing anastomotic ulcer.  CSCOPE 11/22/22 The colon appeared unremarkable to the level of the ileocolonic side to side  anastomosis. There was a friable anastomotic ulcer around 50% of the  circumference. The blind end of the ileum had a 0.5cm ulcer. The afferent  neoterminal ileum was unremarkable. (Biopsy) This is slightly worse than the  2020 evaluation c/w i2b recurrence.  PATH: 1. Neoterminal ileum; biopsy: - Small intestine mucosa without significant histopathologic findings.   2. Colon, transverse; biopsy: - Colonic mucosa without significant histopathologic findings.   Last iron infusions this past summer 2022.   IFX levels ~ 34, no antibodies CRP and hgb normal  Previous history  June 2019, patient's IFX dose was increased to 10mg/kg from 5mg/kg. After 2 sessions of the increased dose, he underwent a colonoscopy 9/5/19 to see whether there was any improvement in the ulcerations with higher dose of infliximab. Colonoscopy showed i1 disease, and anastomotic ulcer was still present friable with spontaneous oozing, seemed to have extended since prior colonoscopy. At that time discussed the possibility of a hyperbaric chamber with high dose oxygen as treatment for the ulcer, which has been tried for other indications. He was unable to find a place that accepted his insurance.   Colonoscopy 9/5/19: side to side anastomosis, Leonard-TI with 2 aphthous ulcers, Ruutgers score i1, anastomotic ulcer friable with spontaneous oozing. Seemed to have extended since prior colon despite increased IFX. Ulcer not likely inflammatory in nature.  Pathology: unremarkable  EIMs: No  CBC, CMP unremarkable from previous infusion  ------------------------------------------------------------------------------------------------------------------- PRIOR Hx -  28 Y M, hx of prior small bowel resection - first for ileo-cecal intussusception as an infant in 1990, and then distal SBR in 2013 for GI bleed; presents today s/p hospitalization and emergent weekend endoscopy for GI bleed and new diagnosis of Crohn's Disease. bx apparently from ileocolonic anastomosis, but VCE showed numerous diffuse sb aphthae.  The patient had sx from his teen years, but moved around and didn't seek care during periods of wellness and also didn't have insurance coverage for a portion of the time. Before moving to [place], he had several bleeding episodes, one which req. laparoscopic evaluation and resection of small bowel (in [place]) with unclear pathology. NO one has told him he had any evidence of Crohn's in past.   Pt was hospitalized in February and in April for melanic stools and anemia. He was transfused both admissions. He also had EGD/Colonoscopy and VCE (no reports available) that revealed multiple ulcers at prior ileo-cecal anastomosis w/biopsies that showed active chronic enteritis.  EGD unrevealing.  Pt was admitted to [facility] from 7/20-7/27/18 for recurrent LGIB 2/2 ileocolic anastomotic ulcer. He underwent a laparoscopic ileocolic resection with side-side anastomosis on 7/23/17.  Since then, he has received two more loading doses of IFX.    Prior biopsies of small bowel reveal active chronic enteritis with cryptitis and crypt architecture distortion. VCE revealed multiple ulcers in the jejunum and terminal ileum.  Review of op report from [place] 2013 for GIB from anastamotic ulcer: 1. ileocectomy with Resection of previous ileal-ileal anastamosis 2. Mid ileum resection 3. Resection of previous jejunum to jeunum anastamosis; ?which had been bypassed; resected in setting of GIB.    Fam hx: no IBD or CRC Social hx: tobacco user 5x/week, alcohol use 5 drinks/week, marijuana use on occasion not weekly, NSAID use once/two weeks for HAs.  [Home] : at home, [unfilled] , at the time of the visit. [Medical Office: ([facility])___] : at the medical office located in  [Verbal consent obtained from patient] : the patient, [unfilled]

## 2023-12-29 ENCOUNTER — TRANSCRIPTION ENCOUNTER (OUTPATIENT)
Age: 33
End: 2023-12-29

## 2023-12-29 RX ORDER — CHOLESTYRAMINE 4 G/9G
4 POWDER, FOR SUSPENSION ORAL
Qty: 1 | Refills: 4 | Status: ACTIVE | COMMUNITY
Start: 2019-11-04 | End: 1900-01-01

## 2024-01-02 ENCOUNTER — APPOINTMENT (OUTPATIENT)
Dept: RHEUMATOLOGY | Facility: CLINIC | Age: 34
End: 2024-01-02
Payer: COMMERCIAL

## 2024-01-02 ENCOUNTER — LABORATORY RESULT (OUTPATIENT)
Age: 34
End: 2024-01-02

## 2024-01-02 ENCOUNTER — MED ADMIN CHARGE (OUTPATIENT)
Age: 34
End: 2024-01-02

## 2024-01-02 VITALS
RESPIRATION RATE: 14 BRPM | HEART RATE: 64 BPM | DIASTOLIC BLOOD PRESSURE: 83 MMHG | OXYGEN SATURATION: 98 % | TEMPERATURE: 97.2 F | SYSTOLIC BLOOD PRESSURE: 128 MMHG

## 2024-01-02 VITALS
RESPIRATION RATE: 14 BRPM | HEART RATE: 61 BPM | TEMPERATURE: 98.2 F | SYSTOLIC BLOOD PRESSURE: 133 MMHG | DIASTOLIC BLOOD PRESSURE: 85 MMHG | OXYGEN SATURATION: 98 %

## 2024-01-02 PROCEDURE — 36415 COLL VENOUS BLD VENIPUNCTURE: CPT

## 2024-01-02 PROCEDURE — 96413 CHEMO IV INFUSION 1 HR: CPT

## 2024-01-02 PROCEDURE — 96415 CHEMO IV INFUSION ADDL HR: CPT

## 2024-01-02 RX ORDER — INFLIXIMAB 100 MG/10ML
100 INJECTION, POWDER, LYOPHILIZED, FOR SOLUTION INTRAVENOUS
Qty: 1 | Refills: 0 | Status: COMPLETED | OUTPATIENT
Start: 2023-12-28

## 2024-01-02 RX ORDER — CYANOCOBALAMIN 1000 UG/ML
1000 INJECTION INTRAMUSCULAR; SUBCUTANEOUS
Qty: 0 | Refills: 0 | Status: COMPLETED | OUTPATIENT
Start: 2023-12-29

## 2024-01-02 NOTE — HISTORY OF PRESENT ILLNESS
[N/A] : N/A [Denies] : Denies [No] : No [Yes] : Yes [Declined] : Declined [24g] : 24g [Start Time: ___] : Medication Start Time: [unfilled] [End Time: ___] : Medication End Time: [unfilled] [Medication Name: ___] : Medication Name: [unfilled] [IV discontinued. Intact. No signs or symptoms of IV complications noted. Time: ___] : IV discontinued. Intact. No signs or symptoms of IV complications noted. Time: [unfilled] [Patient  instructed to seek medical attention with signs and symptoms of adverse effects] : Patient  instructed to seek medical attention with signs and symptoms of adverse effects [Patient left unit in no acute distress] : Patient left unit in no acute distress [Medications administered as ordered and tolerated well.] : Medications administered as ordered and tolerated well. [Blood drawn at time of visit] : Blood drawn at time of visit [de-identified] : Right forearm [de-identified] : 2:20pm

## 2024-01-09 LAB
A-TOCOPHEROL VIT E SERPL-MCNC: 13.6 MG/L
BETA+GAMMA TOCOPHEROL SERPL-MCNC: 1.9 MG/L
MENADIONE SERPL-MCNC: 2.58 NG/ML
VIT A SERPL-MCNC: 82.6 UG/DL
VIT C SERPL-MCNC: 0.8 MG/DL
ZINC SERPL-MCNC: 86 UG/DL

## 2024-01-31 ENCOUNTER — MED ADMIN CHARGE (OUTPATIENT)
Age: 34
End: 2024-01-31

## 2024-01-31 ENCOUNTER — APPOINTMENT (OUTPATIENT)
Dept: RHEUMATOLOGY | Facility: CLINIC | Age: 34
End: 2024-01-31
Payer: COMMERCIAL

## 2024-01-31 VITALS
HEART RATE: 65 BPM | RESPIRATION RATE: 16 BRPM | OXYGEN SATURATION: 99 % | DIASTOLIC BLOOD PRESSURE: 82 MMHG | TEMPERATURE: 97.9 F | SYSTOLIC BLOOD PRESSURE: 122 MMHG

## 2024-01-31 VITALS
HEART RATE: 69 BPM | TEMPERATURE: 97.6 F | OXYGEN SATURATION: 99 % | RESPIRATION RATE: 16 BRPM | DIASTOLIC BLOOD PRESSURE: 80 MMHG | SYSTOLIC BLOOD PRESSURE: 125 MMHG

## 2024-01-31 PROCEDURE — 36415 COLL VENOUS BLD VENIPUNCTURE: CPT

## 2024-01-31 PROCEDURE — 96415 CHEMO IV INFUSION ADDL HR: CPT

## 2024-01-31 PROCEDURE — 96413 CHEMO IV INFUSION 1 HR: CPT

## 2024-01-31 RX ORDER — CYANOCOBALAMIN 1000 UG/ML
1000 INJECTION INTRAMUSCULAR; SUBCUTANEOUS
Qty: 0 | Refills: 0 | Status: COMPLETED | OUTPATIENT
Start: 2024-01-30

## 2024-01-31 RX ORDER — INFLIXIMAB 100 MG/10ML
100 INJECTION, POWDER, LYOPHILIZED, FOR SOLUTION INTRAVENOUS
Qty: 1 | Refills: 0 | Status: COMPLETED | OUTPATIENT
Start: 2024-01-25

## 2024-01-31 NOTE — HISTORY OF PRESENT ILLNESS
[N/A] : N/A [Denies] : Denies [No] : No [Yes] : Yes [Declined] : Declined [Informed consent documented in EHR.] : Informed consent documented in EHR. [de-identified] : B12 shot given 1000 mcg [Left upper extremity] : Left upper extremity [22g] : 22g [Start Time: ___] : Medication Start Time: [unfilled] [End Time: ___] : Medication End Time: [unfilled] [Medication Name: ___] : Medication Name: [unfilled] [Total Amount Administered: ___] : Total Amount Administered: [unfilled] [IV discontinued. Intact. No signs or symptoms of IV complications noted. Time: ___] : IV discontinued. Intact. No signs or symptoms of IV complications noted. Time: [unfilled] [Patient  instructed to seek medical attention with signs and symptoms of adverse effects] : Patient  instructed to seek medical attention with signs and symptoms of adverse effects [Patient left unit in no acute distress] : Patient left unit in no acute distress [Medications administered as ordered and tolerated well.] : Medications administered as ordered and tolerated well. [Blood drawn at time of visit] : Blood drawn at time of visit [de-identified] : 2060

## 2024-02-01 LAB
ALBUMIN SERPL ELPH-MCNC: 4.2 G/DL
ALP BLD-CCNC: 65 U/L
ALT SERPL-CCNC: 20 U/L
ANION GAP SERPL CALC-SCNC: 12 MMOL/L
AST SERPL-CCNC: 23 U/L
BASOPHILS # BLD AUTO: 0.05 K/UL
BASOPHILS NFR BLD AUTO: 0.8 %
BILIRUB SERPL-MCNC: 0.3 MG/DL
BUN SERPL-MCNC: 14 MG/DL
CALCIUM SERPL-MCNC: 9.9 MG/DL
CHLORIDE SERPL-SCNC: 102 MMOL/L
CO2 SERPL-SCNC: 23 MMOL/L
CREAT SERPL-MCNC: 1.05 MG/DL
CRP SERPL-MCNC: <3 MG/L
EGFR: 95 ML/MIN/1.73M2
EOSINOPHIL # BLD AUTO: 0.2 K/UL
EOSINOPHIL NFR BLD AUTO: 3.1 %
GLUCOSE SERPL-MCNC: 94 MG/DL
HCT VFR BLD CALC: 47.3 %
HGB BLD-MCNC: 16.3 G/DL
IMM GRANULOCYTES NFR BLD AUTO: 0.8 %
LYMPHOCYTES # BLD AUTO: 1.97 K/UL
LYMPHOCYTES NFR BLD AUTO: 30.4 %
MAN DIFF?: NORMAL
MCHC RBC-ENTMCNC: 31.1 PG
MCHC RBC-ENTMCNC: 34.5 GM/DL
MCV RBC AUTO: 90.3 FL
MONOCYTES # BLD AUTO: 0.62 K/UL
MONOCYTES NFR BLD AUTO: 9.6 %
NEUTROPHILS # BLD AUTO: 3.6 K/UL
NEUTROPHILS NFR BLD AUTO: 55.3 %
PLATELET # BLD AUTO: 239 K/UL
POTASSIUM SERPL-SCNC: 4.6 MMOL/L
PROT SERPL-MCNC: 7 G/DL
RBC # BLD: 5.24 M/UL
RBC # FLD: 12.9 %
SODIUM SERPL-SCNC: 136 MMOL/L
VIT B12 SERPL-MCNC: 586 PG/ML
WBC # FLD AUTO: 6.49 K/UL

## 2024-02-28 ENCOUNTER — MED ADMIN CHARGE (OUTPATIENT)
Age: 34
End: 2024-02-28

## 2024-02-28 ENCOUNTER — APPOINTMENT (OUTPATIENT)
Dept: RHEUMATOLOGY | Facility: CLINIC | Age: 34
End: 2024-02-28
Payer: COMMERCIAL

## 2024-02-28 VITALS
DIASTOLIC BLOOD PRESSURE: 88 MMHG | TEMPERATURE: 98.2 F | RESPIRATION RATE: 6 BRPM | SYSTOLIC BLOOD PRESSURE: 132 MMHG | HEART RATE: 69 BPM | OXYGEN SATURATION: 100 %

## 2024-02-28 VITALS
RESPIRATION RATE: 16 BRPM | OXYGEN SATURATION: 99 % | TEMPERATURE: 97.9 F | SYSTOLIC BLOOD PRESSURE: 126 MMHG | DIASTOLIC BLOOD PRESSURE: 83 MMHG | HEART RATE: 66 BPM

## 2024-02-28 PROCEDURE — 96415 CHEMO IV INFUSION ADDL HR: CPT

## 2024-02-28 PROCEDURE — 36415 COLL VENOUS BLD VENIPUNCTURE: CPT

## 2024-02-28 PROCEDURE — 96413 CHEMO IV INFUSION 1 HR: CPT

## 2024-02-28 RX ORDER — INFLIXIMAB 100 MG/10ML
100 INJECTION, POWDER, LYOPHILIZED, FOR SOLUTION INTRAVENOUS
Qty: 1 | Refills: 0 | Status: COMPLETED | OUTPATIENT
Start: 2024-02-23

## 2024-02-28 RX ORDER — CYANOCOBALAMIN 1000 UG/ML
1000 INJECTION INTRAMUSCULAR; SUBCUTANEOUS
Qty: 0 | Refills: 0 | Status: COMPLETED | OUTPATIENT
Start: 2024-02-28

## 2024-02-28 RX ADMIN — CYANOCOBALAMIN 0 MCG/ML: 1000 INJECTION INTRAMUSCULAR; SUBCUTANEOUS at 00:00

## 2024-02-28 NOTE — HISTORY OF PRESENT ILLNESS
[N/A] : N/A [Denies] : Denies [Yes] : Yes [No] : No [Declined] : Declined [Informed consent documented in EHR.] : Informed consent documented in EHR. [Left upper extremity] : Left upper extremity [22g] : 22g [Start Time: ___] : Medication Start Time: [unfilled] [End Time: ___] : Medication End Time: [unfilled] [Medication Name: ___] : Medication Name: [unfilled] [Total Amount Administered: ___] : Total Amount Administered: [unfilled] [IV discontinued. Intact. No signs or symptoms of IV complications noted. Time: ___] : IV discontinued. Intact. No signs or symptoms of IV complications noted. Time: [unfilled] [Patient  instructed to seek medical attention with signs and symptoms of adverse effects] : Patient  instructed to seek medical attention with signs and symptoms of adverse effects [Patient left unit in no acute distress] : Patient left unit in no acute distress [Medications administered as ordered and tolerated well.] : Medications administered as ordered and tolerated well. [de-identified] : LFA 2inches below "O" in tattoo [Blood drawn at time of visit] : Blood drawn at time of visit [de-identified] : 2824 [de-identified] : B12 shot given and serum level added to BW

## 2024-02-29 LAB
ALBUMIN SERPL ELPH-MCNC: 4.5 G/DL
ALP BLD-CCNC: 64 U/L
ALT SERPL-CCNC: 23 U/L
ANION GAP SERPL CALC-SCNC: 13 MMOL/L
AST SERPL-CCNC: 22 U/L
BASOPHILS # BLD AUTO: 0.05 K/UL
BASOPHILS NFR BLD AUTO: 0.7 %
BILIRUB SERPL-MCNC: 0.6 MG/DL
BUN SERPL-MCNC: 16 MG/DL
CALCIUM SERPL-MCNC: 9.6 MG/DL
CHLORIDE SERPL-SCNC: 105 MMOL/L
CO2 SERPL-SCNC: 20 MMOL/L
CREAT SERPL-MCNC: 1.09 MG/DL
CRP SERPL-MCNC: <3 MG/L
EGFR: 90 ML/MIN/1.73M2
EOSINOPHIL # BLD AUTO: 0.18 K/UL
EOSINOPHIL NFR BLD AUTO: 2.6 %
GLUCOSE SERPL-MCNC: 82 MG/DL
HCT VFR BLD CALC: 46.4 %
HGB BLD-MCNC: 15.7 G/DL
IMM GRANULOCYTES NFR BLD AUTO: 0.4 %
LYMPHOCYTES # BLD AUTO: 2.18 K/UL
LYMPHOCYTES NFR BLD AUTO: 31.2 %
MAN DIFF?: NORMAL
MCHC RBC-ENTMCNC: 31.2 PG
MCHC RBC-ENTMCNC: 33.8 GM/DL
MCV RBC AUTO: 92.1 FL
MONOCYTES # BLD AUTO: 0.76 K/UL
MONOCYTES NFR BLD AUTO: 10.9 %
NEUTROPHILS # BLD AUTO: 3.79 K/UL
NEUTROPHILS NFR BLD AUTO: 54.2 %
PLATELET # BLD AUTO: 256 K/UL
POTASSIUM SERPL-SCNC: 4.4 MMOL/L
PROT SERPL-MCNC: 7.2 G/DL
RBC # BLD: 5.04 M/UL
RBC # FLD: 13.2 %
SODIUM SERPL-SCNC: 138 MMOL/L
VIT B12 SERPL-MCNC: 408 PG/ML
WBC # FLD AUTO: 6.99 K/UL

## 2024-03-28 ENCOUNTER — MED ADMIN CHARGE (OUTPATIENT)
Age: 34
End: 2024-03-28

## 2024-03-28 ENCOUNTER — APPOINTMENT (OUTPATIENT)
Dept: RHEUMATOLOGY | Facility: CLINIC | Age: 34
End: 2024-03-28
Payer: COMMERCIAL

## 2024-03-28 VITALS
RESPIRATION RATE: 16 BRPM | OXYGEN SATURATION: 100 % | HEART RATE: 75 BPM | TEMPERATURE: 97.9 F | DIASTOLIC BLOOD PRESSURE: 81 MMHG | SYSTOLIC BLOOD PRESSURE: 135 MMHG

## 2024-03-28 VITALS
RESPIRATION RATE: 16 BRPM | SYSTOLIC BLOOD PRESSURE: 129 MMHG | TEMPERATURE: 98.1 F | HEART RATE: 70 BPM | OXYGEN SATURATION: 100 % | DIASTOLIC BLOOD PRESSURE: 75 MMHG

## 2024-03-28 PROCEDURE — 36415 COLL VENOUS BLD VENIPUNCTURE: CPT

## 2024-03-28 PROCEDURE — 96415 CHEMO IV INFUSION ADDL HR: CPT

## 2024-03-28 PROCEDURE — 96413 CHEMO IV INFUSION 1 HR: CPT

## 2024-03-28 RX ORDER — CYANOCOBALAMIN 1000 UG/ML
1000 INJECTION INTRAMUSCULAR; SUBCUTANEOUS
Qty: 0 | Refills: 0 | Status: COMPLETED | OUTPATIENT
Start: 2024-03-22

## 2024-03-28 RX ORDER — INFLIXIMAB 100 MG/10ML
100 INJECTION, POWDER, LYOPHILIZED, FOR SOLUTION INTRAVENOUS
Qty: 1 | Refills: 0 | Status: COMPLETED | OUTPATIENT
Start: 2024-03-22

## 2024-03-28 NOTE — HISTORY OF PRESENT ILLNESS
[N/A] : N/A [Denies] : Denies [No] : No [Yes] : Yes [Declined] : Declined [Informed consent documented in EHR.] : Informed consent documented in EHR. [Left upper extremity] : Left upper extremity [22g] : 22g [Start Time: ___] : Medication Start Time: [unfilled] [End Time: ___] : Medication End Time: [unfilled] [Medication Name: ___] : Medication Name: [unfilled] [Total Amount Administered: ___] : Total Amount Administered: [unfilled] [IV discontinued. Intact. No signs or symptoms of IV complications noted. Time: ___] : IV discontinued. Intact. No signs or symptoms of IV complications noted. Time: [unfilled] [Patient  instructed to seek medical attention with signs and symptoms of adverse effects] : Patient  instructed to seek medical attention with signs and symptoms of adverse effects [Patient left unit in no acute distress] : Patient left unit in no acute distress [Medications administered as ordered and tolerated well.] : Medications administered as ordered and tolerated well. [Blood drawn at time of visit] : Blood drawn at time of visit [de-identified] : see previous notes on placement

## 2024-03-29 LAB
ALBUMIN SERPL ELPH-MCNC: 4.3 G/DL
ALP BLD-CCNC: 64 U/L
ALT SERPL-CCNC: 21 U/L
ANION GAP SERPL CALC-SCNC: 11 MMOL/L
AST SERPL-CCNC: 20 U/L
BASOPHILS # BLD AUTO: 0.05 K/UL
BASOPHILS NFR BLD AUTO: 0.8 %
BILIRUB SERPL-MCNC: 0.6 MG/DL
BUN SERPL-MCNC: 18 MG/DL
CALCIUM SERPL-MCNC: 9.6 MG/DL
CHLORIDE SERPL-SCNC: 102 MMOL/L
CO2 SERPL-SCNC: 24 MMOL/L
CREAT SERPL-MCNC: 1.08 MG/DL
CRP SERPL-MCNC: <3 MG/L
EGFR: 91 ML/MIN/1.73M2
EOSINOPHIL # BLD AUTO: 0.21 K/UL
EOSINOPHIL NFR BLD AUTO: 3.3 %
GLUCOSE SERPL-MCNC: 86 MG/DL
HCT VFR BLD CALC: 47.1 %
HGB BLD-MCNC: 16 G/DL
IMM GRANULOCYTES NFR BLD AUTO: 0.3 %
LYMPHOCYTES # BLD AUTO: 1.85 K/UL
LYMPHOCYTES NFR BLD AUTO: 29.1 %
MAN DIFF?: NORMAL
MCHC RBC-ENTMCNC: 30.9 PG
MCHC RBC-ENTMCNC: 34 GM/DL
MCV RBC AUTO: 90.9 FL
MONOCYTES # BLD AUTO: 0.49 K/UL
MONOCYTES NFR BLD AUTO: 7.7 %
NEUTROPHILS # BLD AUTO: 3.73 K/UL
NEUTROPHILS NFR BLD AUTO: 58.8 %
PLATELET # BLD AUTO: 271 K/UL
POTASSIUM SERPL-SCNC: 4.6 MMOL/L
PROT SERPL-MCNC: 7.2 G/DL
RBC # BLD: 5.18 M/UL
RBC # FLD: 13.7 %
SODIUM SERPL-SCNC: 137 MMOL/L
VIT B12 SERPL-MCNC: 393 PG/ML
WBC # FLD AUTO: 6.35 K/UL

## 2024-04-29 ENCOUNTER — APPOINTMENT (OUTPATIENT)
Dept: RHEUMATOLOGY | Facility: CLINIC | Age: 34
End: 2024-04-29
Payer: COMMERCIAL

## 2024-04-29 ENCOUNTER — MED ADMIN CHARGE (OUTPATIENT)
Age: 34
End: 2024-04-29

## 2024-04-29 VITALS
RESPIRATION RATE: 16 BRPM | SYSTOLIC BLOOD PRESSURE: 122 MMHG | DIASTOLIC BLOOD PRESSURE: 70 MMHG | OXYGEN SATURATION: 99 % | HEART RATE: 69 BPM | TEMPERATURE: 98 F

## 2024-04-29 VITALS
TEMPERATURE: 97.9 F | DIASTOLIC BLOOD PRESSURE: 74 MMHG | OXYGEN SATURATION: 100 % | SYSTOLIC BLOOD PRESSURE: 127 MMHG | HEART RATE: 66 BPM | RESPIRATION RATE: 16 BRPM

## 2024-04-29 PROCEDURE — 96413 CHEMO IV INFUSION 1 HR: CPT

## 2024-04-29 PROCEDURE — 36415 COLL VENOUS BLD VENIPUNCTURE: CPT

## 2024-04-29 PROCEDURE — 96415 CHEMO IV INFUSION ADDL HR: CPT

## 2024-04-29 RX ORDER — CYANOCOBALAMIN 1000 UG/ML
1000 INJECTION INTRAMUSCULAR; SUBCUTANEOUS
Qty: 0 | Refills: 0 | Status: COMPLETED | OUTPATIENT
Start: 2024-04-24

## 2024-04-29 RX ORDER — INFLIXIMAB 100 MG/10ML
100 INJECTION, POWDER, LYOPHILIZED, FOR SOLUTION INTRAVENOUS
Qty: 1 | Refills: 0 | Status: COMPLETED | OUTPATIENT
Start: 2024-04-24

## 2024-04-29 NOTE — HISTORY OF PRESENT ILLNESS
[N/A] : N/A [Denies] : Denies [No] : No [Declined] : Declined [Informed consent documented in EHR.] : Informed consent documented in EHR. [Left upper extremity] : Left upper extremity [22g] : 22g [Start Time: ___] : Medication Start Time: [unfilled] [End Time: ___] : Medication End Time: [unfilled] [Medication Name: ___] : Medication Name: [unfilled] [Total Amount Administered: ___] : Total Amount Administered: [unfilled] [IV discontinued. Intact. No signs or symptoms of IV complications noted. Time: ___] : IV discontinued. Intact. No signs or symptoms of IV complications noted. Time: [unfilled] [Patient  instructed to seek medical attention with signs and symptoms of adverse effects] : Patient  instructed to seek medical attention with signs and symptoms of adverse effects [Patient left unit in no acute distress] : Patient left unit in no acute distress [Medications administered as ordered and tolerated well.] : Medications administered as ordered and tolerated well. [Blood drawn at time of visit] : Blood drawn at time of visit [de-identified] : LFA [de-identified] : [id number]

## 2024-04-30 LAB
ALBUMIN SERPL ELPH-MCNC: 4.2 G/DL
ALP BLD-CCNC: 64 U/L
ALT SERPL-CCNC: 20 U/L
ANION GAP SERPL CALC-SCNC: 13 MMOL/L
AST SERPL-CCNC: 21 U/L
BASOPHILS # BLD AUTO: 0.06 K/UL
BASOPHILS NFR BLD AUTO: 0.8 %
BILIRUB SERPL-MCNC: 0.4 MG/DL
BUN SERPL-MCNC: 13 MG/DL
CALCIUM SERPL-MCNC: 9.3 MG/DL
CHLORIDE SERPL-SCNC: 101 MMOL/L
CO2 SERPL-SCNC: 24 MMOL/L
CREAT SERPL-MCNC: 1.05 MG/DL
CRP SERPL-MCNC: <3 MG/L
EGFR: 94 ML/MIN/1.73M2
EOSINOPHIL # BLD AUTO: 0.44 K/UL
EOSINOPHIL NFR BLD AUTO: 5.5 %
GLUCOSE SERPL-MCNC: 101 MG/DL
HCT VFR BLD CALC: 43.7 %
HGB BLD-MCNC: 14.8 G/DL
IMM GRANULOCYTES NFR BLD AUTO: 0.5 %
LYMPHOCYTES # BLD AUTO: 2.39 K/UL
LYMPHOCYTES NFR BLD AUTO: 29.9 %
MAN DIFF?: NORMAL
MCHC RBC-ENTMCNC: 31 PG
MCHC RBC-ENTMCNC: 33.9 GM/DL
MCV RBC AUTO: 91.6 FL
MONOCYTES # BLD AUTO: 0.74 K/UL
MONOCYTES NFR BLD AUTO: 9.3 %
NEUTROPHILS # BLD AUTO: 4.32 K/UL
NEUTROPHILS NFR BLD AUTO: 54 %
PLATELET # BLD AUTO: 235 K/UL
POTASSIUM SERPL-SCNC: 4.6 MMOL/L
PROT SERPL-MCNC: 6.8 G/DL
RBC # BLD: 4.77 M/UL
RBC # FLD: 13.7 %
SODIUM SERPL-SCNC: 137 MMOL/L
VIT B12 SERPL-MCNC: 334 PG/ML
WBC # FLD AUTO: 7.99 K/UL

## 2024-04-30 NOTE — PATIENT PROFILE ADULT. - BRADEN SCORE (IF 18 OR LESS ACTIVATE SKIN INJURY RISK INCREASED GUIDELINE), MLM
LVM in regards to scheduling nutrition appt. MA instructed pt. in voicemail to call the office number for scheduling.         OMAIRA CAPONE ext [phone]   23

## 2024-05-29 ENCOUNTER — APPOINTMENT (OUTPATIENT)
Dept: RHEUMATOLOGY | Facility: CLINIC | Age: 34
End: 2024-05-29
Payer: COMMERCIAL

## 2024-05-29 ENCOUNTER — MED ADMIN CHARGE (OUTPATIENT)
Age: 34
End: 2024-05-29

## 2024-05-29 VITALS
OXYGEN SATURATION: 100 % | TEMPERATURE: 98.2 F | SYSTOLIC BLOOD PRESSURE: 115 MMHG | RESPIRATION RATE: 16 BRPM | DIASTOLIC BLOOD PRESSURE: 70 MMHG | HEART RATE: 70 BPM

## 2024-05-29 VITALS
DIASTOLIC BLOOD PRESSURE: 68 MMHG | SYSTOLIC BLOOD PRESSURE: 119 MMHG | TEMPERATURE: 98 F | HEART RATE: 67 BPM | RESPIRATION RATE: 16 BRPM | OXYGEN SATURATION: 100 %

## 2024-05-29 PROCEDURE — 96415 CHEMO IV INFUSION ADDL HR: CPT

## 2024-05-29 PROCEDURE — 36415 COLL VENOUS BLD VENIPUNCTURE: CPT

## 2024-05-29 PROCEDURE — 96413 CHEMO IV INFUSION 1 HR: CPT

## 2024-05-29 RX ORDER — CYANOCOBALAMIN 1000 UG/ML
1000 INJECTION INTRAMUSCULAR; SUBCUTANEOUS
Qty: 0 | Refills: 0 | Status: COMPLETED | OUTPATIENT
Start: 2024-05-23

## 2024-05-29 RX ORDER — INFLIXIMAB 100 MG/10ML
100 INJECTION, POWDER, LYOPHILIZED, FOR SOLUTION INTRAVENOUS
Qty: 1 | Refills: 0 | Status: COMPLETED | OUTPATIENT
Start: 2024-05-23

## 2024-05-29 NOTE — HISTORY OF PRESENT ILLNESS
[N/A] : N/A [Denies] : Denies [No] : No [Yes] : Yes [Declined] : Declined [Informed consent documented in EHR.] : Informed consent documented in EHR. [Right upper extremity] : Right upper extremity [22g] : 22g [Start Time: ___] : Medication Start Time: [unfilled] [End Time: ___] : Medication End Time: [unfilled] [Medication Name: ___] : Medication Name: [unfilled] [Total Amount Administered: ___] : Total Amount Administered: [unfilled] [IV discontinued. Intact. No signs or symptoms of IV complications noted. Time: ___] : IV discontinued. Intact. No signs or symptoms of IV complications noted. Time: [unfilled] [Patient  instructed to seek medical attention with signs and symptoms of adverse effects] : Patient  instructed to seek medical attention with signs and symptoms of adverse effects [Patient left unit in no acute distress] : Patient left unit in no acute distress [Medications administered as ordered and tolerated well.] : Medications administered as ordered and tolerated well. [Blood drawn at time of visit] : Blood drawn at time of visit [de-identified] : RW [de-identified] : [id number]

## 2024-05-30 ENCOUNTER — TRANSCRIPTION ENCOUNTER (OUTPATIENT)
Age: 34
End: 2024-05-30

## 2024-06-18 ENCOUNTER — APPOINTMENT (OUTPATIENT)
Dept: GASTROENTEROLOGY | Facility: CLINIC | Age: 34
End: 2024-06-18
Payer: COMMERCIAL

## 2024-06-18 DIAGNOSIS — E53.8 DEFICIENCY OF OTHER SPECIFIED B GROUP VITAMINS: ICD-10-CM

## 2024-06-18 DIAGNOSIS — R19.7 INTESTINAL MALABSORPTION, UNSPECIFIED: ICD-10-CM

## 2024-06-18 DIAGNOSIS — K90.9 INTESTINAL MALABSORPTION, UNSPECIFIED: ICD-10-CM

## 2024-06-18 DIAGNOSIS — D50.9 IRON DEFICIENCY ANEMIA, UNSPECIFIED: ICD-10-CM

## 2024-06-18 PROCEDURE — 99214 OFFICE O/P EST MOD 30 MIN: CPT

## 2024-06-18 PROCEDURE — G2211 COMPLEX E/M VISIT ADD ON: CPT

## 2024-06-18 RX ORDER — SERTRALINE 25 MG/1
25 TABLET, FILM COATED ORAL
Refills: 0 | Status: ACTIVE | COMMUNITY

## 2024-06-19 PROBLEM — D50.9 IRON DEFICIENCY ANEMIA: Status: ACTIVE | Noted: 2018-05-23

## 2024-06-19 PROBLEM — K90.9 DIARRHEA DUE TO MALABSORPTION: Status: ACTIVE | Noted: 2024-06-19

## 2024-06-19 PROBLEM — E53.8 LOW SERUM VITAMIN B12: Status: ACTIVE | Noted: 2023-11-02

## 2024-06-19 NOTE — ASSESSMENT
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   # CD s/p SBR for multiple unrelated reasons? - improvement on cscope, anastomosis remains, hemoclips place - cont trend h/h with infusions and his hematologist  - advised hyperbaric chamber for non-healing anastomotic ulcer which has ischemic component; however pt has been out of work since February, has not been able to schedule due to insurance lapses/person life stressors  - Continue with IFX 10 mg/kg q4 weeks, drug levels remained therapeutic with no ab  - Continue to monitor drug levels which are > 34 with no antibodies - reviewed labs, normal CRP and Hgb, will check iron studies and vitamin levels today  - plan for repeat CSCOPE November 2024   #Persistent iron def anemia- improved s/p Fe infusion last summer however now iron decreasing again; stable hemoglobin, most recently iron sat 14% ferritin 25  - cont iron infusions PRN with Heme (about once/year) - evaluate anastamotic ulcer at upcoming cscope   #B12 anemia - cont b12 monthly with infusions given risk for low levels - cont nascobal weekly  - continue to check levels   # Bile acid diarrhea - c/w cholestyramine PRN, titrate to 1-2 BMs daily (asked him to consider taking with his largest meals)  F/U ~ October 2024 prior to scheduling November 2024 cscope

## 2024-06-19 NOTE — HISTORY OF PRESENT ILLNESS
[Home] : at home, [unfilled] , at the time of the visit. 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   6/18/24 Pt feeling well. He has 2 formed BMs daily without blood. Takes cholestyramine every day which really helps with loose stools, if misses dose then will have diarrhea. Able to tolerate most foods. Weight is stable, wants to lose a bit of weight, feels heavier than usual. Denies abdominal pain, nausea, vomiting. No EIMs. Continues Remicade Q 4 weeks. He is requesting iron levels be checked since has history of anastomic ulcer requiring 3 hemoclips.   Last infusion labs: CBC/CMP/CRP wnl   Previous hx: CSCOPE: Additional findings Overall the colonoscopy was unremarkable to the to the level  of the ileocolonic anastomosis. There was a friable anastomotic ulcer covering  1/3rd of circumference. The neoterminal ileum appeared unremarkable (improved  from prior). 3 Hemoclips were applied to the ulcer with the intention of  initiating remodeling leading to ulcer healing..One endoclip was successfully  applied for the purpose of hemostasis.    Impressions:    Overall the colonoscopy was unremarkable to the to the level of the  ileocolonic anastomosis. There was a friable anastomotic ulcer covering 1/3rd of  circumference. The neoterminal ileum appeared unremarkable (improved from  prior). 3 Hemoclips were applied to the ulcer with the intention of initiating  remodeling leading to ulcer healing. (Endoclip).    Plan:    Continue current medical regimen.    Follow-up office visit after procedure completed.  Follow H/H and iron  replacement requirements.   Interval hx: Pt reports he's noticed a little more abd pain in the last few months. Certain foods can be triggers. Some fatigue as well. Thinks pain is more days than not. No nausea/vomiting. No fevers/chills. No weight loss, stable and steady weight gain. Having 2 BMs per day, nonbloody , no black stool.  Of note has also had more anxiety/panic attacks around medical visits/procedures. He recently had near panic attack and infusion center last week and infusion was cancelled. But after speaking to his psych, he was given Klonopin 0.5mg PRN and he took a dose before his infusion yesterday and the infusion proceeded without incident. He reports feeling much better and is happy to continue current treatment.   HPI: Pt reports that he was doing well until early July 2022 when he had an episode of 2 BM with BRBPR which resolved spontaneously. Since that time he's been having 1-2 formed BM daily (on cholestyramine) with no blood or mucous. Denies urgency or rectal pain. Previous colonoscopy as below with healing anastomotic ulcer.  CSCOPE 11/22/22 The colon appeared unremarkable to the level of the ileocolonic side to side  anastomosis. There was a friable anastomotic ulcer around 50% of the  circumference. The blind end of the ileum had a 0.5cm ulcer. The afferent  neoterminal ileum was unremarkable. (Biopsy) This is slightly worse than the  2020 evaluation c/w i2b recurrence.  PATH: 1. Neoterminal ileum; biopsy: - Small intestine mucosa without significant histopathologic findings.   2. Colon, transverse; biopsy: - Colonic mucosa without significant histopathologic findings.   Last iron infusions this past summer 2022.   IFX levels ~ 34, no antibodies CRP and hgb normal  Previous history  June 2019, patient's IFX dose was increased to 10mg/kg from 5mg/kg. After 2 sessions of the increased dose, he underwent a colonoscopy 9/5/19 to see whether there was any improvement in the ulcerations with higher dose of infliximab. Colonoscopy showed i1 disease, and anastomotic ulcer was still present friable with spontaneous oozing, seemed to have extended since prior colonoscopy. At that time discussed the possibility of a hyperbaric chamber with high dose oxygen as treatment for the ulcer, which has been tried for other indications. He was unable to find a place that accepted his insurance.   Colonoscopy 9/5/19: side to side anastomosis, Leonard-TI with 2 aphthous ulcers, Ruutgers score i1, anastomotic ulcer friable with spontaneous oozing. Seemed to have extended since prior colon despite increased IFX. Ulcer not likely inflammatory in nature.  Pathology: unremarkable  EIMs: No  CBC, CMP unremarkable from previous infusion  ------------------------------------------------------------------------------------------------------------------- PRIOR Hx -  28 Y M, hx of prior small bowel resection - first for ileo-cecal intussusception as an infant in 1990, and then distal SBR in 2013 for GI bleed; presents today s/p hospitalization and emergent weekend endoscopy for GI bleed and new diagnosis of Crohn's Disease. bx apparently from ileocolonic anastomosis, but VCE showed numerous diffuse sb aphthae.  The patient had sx from his teen years, but moved around and didn't seek care during periods of wellness and also didn't have insurance coverage for a portion of the time. Before moving to [place], he had several bleeding episodes, one which req. laparoscopic evaluation and resection of small bowel (in [place]) with unclear pathology. NO one has told him he had any evidence of Crohn's in past.   Pt was hospitalized in February and in April for melanic stools and anemia. He was transfused both admissions. He also had EGD/Colonoscopy and VCE (no reports available) that revealed multiple ulcers at prior ileo-cecal anastomosis w/biopsies that showed active chronic enteritis.  EGD unrevealing.  Pt was admitted to [facility] from 7/20-7/27/18 for recurrent LGIB 2/2 ileocolic anastomotic ulcer. He underwent a laparoscopic ileocolic resection with side-side anastomosis on 7/23/17.  Since then, he has received two more loading doses of IFX.    Prior biopsies of small bowel reveal active chronic enteritis with cryptitis and crypt architecture distortion. VCE revealed multiple ulcers in the jejunum and terminal ileum.  Review of op report from [place] 2013 for GIB from anastamotic ulcer: 1. ileocectomy with Resection of previous ileal-ileal anastamosis 2. Mid ileum resection 3. Resection of previous jejunum to jeunum anastamosis; ?which had been bypassed; resected in setting of GIB.    Fam hx: no IBD or CRC Social hx: tobacco user 5x/week, alcohol use 5 drinks/week, marijuana use on occasion not weekly, NSAID use once/two weeks for HAs.

## 2024-06-26 ENCOUNTER — MED ADMIN CHARGE (OUTPATIENT)
Age: 34
End: 2024-06-26

## 2024-06-26 ENCOUNTER — APPOINTMENT (OUTPATIENT)
Dept: RHEUMATOLOGY | Facility: CLINIC | Age: 34
End: 2024-06-26
Payer: COMMERCIAL

## 2024-06-26 DIAGNOSIS — K50.90 CROHN'S DISEASE, UNSPECIFIED, W/OUT COMPLICATIONS: ICD-10-CM

## 2024-06-26 PROCEDURE — 36415 COLL VENOUS BLD VENIPUNCTURE: CPT

## 2024-06-26 PROCEDURE — 96413 CHEMO IV INFUSION 1 HR: CPT

## 2024-06-26 PROCEDURE — 96415 CHEMO IV INFUSION ADDL HR: CPT

## 2024-06-26 RX ORDER — CYANOCOBALAMIN 1000 UG/ML
1000 INJECTION INTRAMUSCULAR; SUBCUTANEOUS
Qty: 0 | Refills: 0 | Status: COMPLETED | OUTPATIENT
Start: 2024-06-19

## 2024-06-26 RX ORDER — INFLIXIMAB 100 MG/10ML
100 INJECTION, POWDER, LYOPHILIZED, FOR SOLUTION INTRAVENOUS
Qty: 1 | Refills: 0 | Status: COMPLETED | OUTPATIENT
Start: 2024-06-19

## 2024-06-27 LAB
25(OH)D3 SERPL-MCNC: 39 NG/ML
ALBUMIN SERPL ELPH-MCNC: 4 G/DL
ALP BLD-CCNC: 59 U/L
ALT SERPL-CCNC: 17 U/L
ANION GAP SERPL CALC-SCNC: 13 MMOL/L
AST SERPL-CCNC: 19 U/L
BASOPHILS # BLD AUTO: 0.05 K/UL
BASOPHILS NFR BLD AUTO: 0.7 %
BILIRUB SERPL-MCNC: 0.3 MG/DL
BUN SERPL-MCNC: 13 MG/DL
CALCIUM SERPL-MCNC: 9.8 MG/DL
CHLORIDE SERPL-SCNC: 103 MMOL/L
CO2 SERPL-SCNC: 22 MMOL/L
CREAT SERPL-MCNC: 0.98 MG/DL
CRP SERPL-MCNC: <3 MG/L
EGFR: 102 ML/MIN/1.73M2
EOSINOPHIL # BLD AUTO: 0.2 K/UL
EOSINOPHIL NFR BLD AUTO: 2.8 %
FERRITIN SERPL-MCNC: 40 NG/ML
FOLATE SERPL-MCNC: 8.4 NG/ML
GLUCOSE SERPL-MCNC: 91 MG/DL
HCT VFR BLD CALC: 43.2 %
HGB BLD-MCNC: 15.2 G/DL
IMM GRANULOCYTES NFR BLD AUTO: 0.6 %
LYMPHOCYTES # BLD AUTO: 2.18 K/UL
LYMPHOCYTES NFR BLD AUTO: 30.3 %
MAN DIFF?: NORMAL
MCHC RBC-ENTMCNC: 30.8 PG
MCHC RBC-ENTMCNC: 35.2 GM/DL
MCV RBC AUTO: 87.6 FL
MONOCYTES # BLD AUTO: 0.61 K/UL
MONOCYTES NFR BLD AUTO: 8.5 %
NEUTROPHILS # BLD AUTO: 4.11 K/UL
NEUTROPHILS NFR BLD AUTO: 57.1 %
PLATELET # BLD AUTO: 225 K/UL
POTASSIUM SERPL-SCNC: 4.4 MMOL/L
PROT SERPL-MCNC: 6.7 G/DL
RBC # BLD: 4.93 M/UL
RBC # FLD: 14.1 %
SODIUM SERPL-SCNC: 138 MMOL/L
VIT B12 SERPL-MCNC: 383 PG/ML
WBC # FLD AUTO: 7.19 K/UL

## 2024-07-24 ENCOUNTER — APPOINTMENT (OUTPATIENT)
Dept: RHEUMATOLOGY | Facility: CLINIC | Age: 34
End: 2024-07-24
Payer: COMMERCIAL

## 2024-07-24 ENCOUNTER — MED ADMIN CHARGE (OUTPATIENT)
Age: 34
End: 2024-07-24

## 2024-07-24 VITALS
SYSTOLIC BLOOD PRESSURE: 145 MMHG | HEART RATE: 70 BPM | DIASTOLIC BLOOD PRESSURE: 90 MMHG | RESPIRATION RATE: 15 BRPM | TEMPERATURE: 98 F | OXYGEN SATURATION: 99 %

## 2024-07-24 VITALS
RESPIRATION RATE: 16 BRPM | SYSTOLIC BLOOD PRESSURE: 135 MMHG | DIASTOLIC BLOOD PRESSURE: 89 MMHG | HEART RATE: 80 BPM | OXYGEN SATURATION: 97 % | TEMPERATURE: 98.2 F

## 2024-07-24 DIAGNOSIS — K50.90 CROHN'S DISEASE, UNSPECIFIED, W/OUT COMPLICATIONS: ICD-10-CM

## 2024-07-24 DIAGNOSIS — E53.8 DEFICIENCY OF OTHER SPECIFIED B GROUP VITAMINS: ICD-10-CM

## 2024-07-24 PROCEDURE — 96413 CHEMO IV INFUSION 1 HR: CPT

## 2024-07-24 PROCEDURE — 36415 COLL VENOUS BLD VENIPUNCTURE: CPT

## 2024-07-24 PROCEDURE — 96415 CHEMO IV INFUSION ADDL HR: CPT

## 2024-07-24 RX ORDER — CYANOCOBALAMIN 1000 UG/ML
1000 INJECTION INTRAMUSCULAR; SUBCUTANEOUS
Qty: 0 | Refills: 0 | Status: COMPLETED | OUTPATIENT
Start: 2024-07-17

## 2024-07-24 RX ORDER — INFLIXIMAB 100 MG/10ML
100 INJECTION, POWDER, LYOPHILIZED, FOR SOLUTION INTRAVENOUS
Qty: 1 | Refills: 0 | Status: COMPLETED | OUTPATIENT
Start: 2024-07-17

## 2024-07-24 NOTE — HISTORY OF PRESENT ILLNESS
[N/A] : N/A [Denies] : Denies [No] : No [Yes] : Yes [Declined] : Declined [Informed consent documented in EHR.] : Informed consent documented in EHR. [Right upper extremity] : Right upper extremity [20g] : 20g [Start Time: ___] : Medication Start Time: [unfilled] [End Time: ___] : Medication End Time: [unfilled] [IV discontinued. Intact. No signs or symptoms of IV complications noted. Time: ___] : IV discontinued. Intact. No signs or symptoms of IV complications noted. Time: [unfilled] [Patient  instructed to seek medical attention with signs and symptoms of adverse effects] : Patient  instructed to seek medical attention with signs and symptoms of adverse effects [Patient left unit in no acute distress] : Patient left unit in no acute distress [Medications administered as ordered and tolerated well.] : Medications administered as ordered and tolerated well. [Blood drawn at time of visit] : Blood drawn at time of visit [de-identified] : right wrist [de-identified] : 2pm

## 2024-07-25 LAB
ALBUMIN SERPL ELPH-MCNC: 4.5 G/DL
ALP BLD-CCNC: 65 U/L
ALT SERPL-CCNC: 19 U/L
ANION GAP SERPL CALC-SCNC: 11 MMOL/L
AST SERPL-CCNC: 17 U/L
BASOPHILS # BLD AUTO: 0.04 K/UL
BASOPHILS NFR BLD AUTO: 0.5 %
BILIRUB SERPL-MCNC: 0.5 MG/DL
BUN SERPL-MCNC: 15 MG/DL
CALCIUM SERPL-MCNC: 9.8 MG/DL
CHLORIDE SERPL-SCNC: 105 MMOL/L
CO2 SERPL-SCNC: 22 MMOL/L
CREAT SERPL-MCNC: 1.08 MG/DL
CRP SERPL-MCNC: <3 MG/L
EGFR: 91 ML/MIN/1.73M2
EOSINOPHIL # BLD AUTO: 0.13 K/UL
EOSINOPHIL NFR BLD AUTO: 1.8 %
GLUCOSE SERPL-MCNC: 97 MG/DL
HCT VFR BLD CALC: 47 %
HGB BLD-MCNC: 15.5 G/DL
IMM GRANULOCYTES NFR BLD AUTO: 0.8 %
LYMPHOCYTES # BLD AUTO: 2.04 K/UL
LYMPHOCYTES NFR BLD AUTO: 27.6 %
MAN DIFF?: NORMAL
MCHC RBC-ENTMCNC: 29.5 PG
MCHC RBC-ENTMCNC: 33 GM/DL
MCV RBC AUTO: 89.4 FL
MONOCYTES # BLD AUTO: 0.67 K/UL
MONOCYTES NFR BLD AUTO: 9.1 %
NEUTROPHILS # BLD AUTO: 4.45 K/UL
NEUTROPHILS NFR BLD AUTO: 60.2 %
PLATELET # BLD AUTO: 253 K/UL
POTASSIUM SERPL-SCNC: 4.4 MMOL/L
PROT SERPL-MCNC: 7 G/DL
RBC # BLD: 5.26 M/UL
RBC # FLD: 13.8 %
SODIUM SERPL-SCNC: 137 MMOL/L
WBC # FLD AUTO: 7.39 K/UL

## 2024-08-09 NOTE — PATIENT PROFILE ADULT. - NS PRO CONTRA REFUSE FLU INFO
August 9, 2024       Randi Saucedo MD  [address]  [place]  [facility] [id number]  Via In Basket      Patient: Sophie Rubin   YOB: 1991   Date of Visit: 8/9/2024       Dear Dr. Saucedo:    Thank you for referring Sophie Rubin to me for evaluation. Below are my notes for this visit with her.    If you have questions, please do not hesitate to call me. I look forward to following your patient along with you.      Sincerely,        Alonso Hernandez MD        CC: No Recipients    Alonso Hernandez MD  8/9/2024 12:43 PM  Sign when Signing Visit  Subjective     Patient ID: Patient is a 33 year old female      Chief Complaint   Patient presents with   • New Patient   • Office Visit         HPI    33-year-old female presents to the office for evaluation for potential parathyroidectomy after being referred by Dr. Saucedo and Dr. Fernandez.  She had kidney stones last year.  She has multiple symptoms of hyperparathyroidism as documented in the review of systems.  Her maternal aunt had hypothyroidism.  No other family history of endocrine disorders.  No history of thyroid or pituitary or adrenal or pancreas tumors in the family.    Component  Ref Range & Units 2 mo ago 6 mo ago   PTH, Intact  19 - 88 pg/mL 97 High  119 High      Calcium  8.4 - 10.2 mg/dL 10.6 High  11.0 High  11.0 High  9.5 10.0          Component  Ref Range & Units 8 d ago  (7/29/24) 8 d ago  (7/29/24)   Calcium, Urine  mg/dL 21.30    Comment: Reference range not available   Period  hrs 24 24   Volume  mL 1,000 1,000   Calcium, Urine 24hr  35 - 200 mg/24hr 213 High       DICTATING PHYSICIAN:  Jabari Walton MD                                     EXAM DATE:    5/5/2023 4:05 AM     EXAM:   CT ABDOMEN PELVIS FOR KIDNEY STONES WO CONTRAST      INDICATION: Right flank pain, hematuria.     COMPARISON: None.     PROCEDURE: CT images of the abdomen and pelvis obtained without the  administration of IV contrast.  Suboptimal  evaluation of the solid organs  and vasculature due to lack of IV contrast.  Dose reduction technique(s)  utilized.     FINDINGS:   LOWER CHEST: Lung bases are clear. Heart is normal size.     ABDOMEN/PELVIS:  Liver: Normal size and contour. No suspicious hepatic lesions, however  evaluation is limited due to lack of contrast.  Gallbladder: No calcified gallstones. No bile duct dilatation.   Pancreas: No mass or adjacent stranding. Normal caliber duct.   Spleen: Normal size.   Stomach and visualized esophagus: No abnormality.  Adrenals: Normal size.   Kidneys: Mild hydronephrosis and hydroureter.  No obstructing renal or  ureteral calculi.  Bladder: 4 mm calculus within the bladder lumen adjacent to the  vesicoureteral junction, likely representing recently passed calculus.  Pelvis: No pelvic mass.   Bowel: No bowel dilatation or wall thickening. The appendix has a normal  appearance.   Peritoneum: Mild mesenteric and retroperitoneal lymph jeffery prominence,  without pathologic enlargement. No significant aortoiliac atherosclerosis.  No retroperitoneal lymphadenopathy.  Body wall and bones: No abnormality demonstrated. No destructive osseous  lesion.     IMPRESSION:  1.    Mild right hydronephrosis and hydroureter with 4 mm calculus in the  bladder lumen adjacent to the vesicoureteral junction, most likely  representing a recently passed calculus.  2.    Mild mesenteric and retroperitoneal lymph jeffery prominence.  A  component of mesenteritis is not excluded.     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    She  has no past medical history of No known problems.  Sophie has Mild persistent asthma without complication (CMD); Kidney stone; Normocytic anemia; Morbid obesity with BMI of 40.0-44.9, adult  (CMD); Ureteric colic; Hypercalcemia; Hyperparathyroidism, primary  (CMD); Obesity (BMI 30-39.9); Hair loss; and Iron deficiency anemia due to chronic blood loss on their problem list.  Sophie has a past surgical history that includes Dilation and curettage; Induced ; colposcopy bx cervix endocerv curr; and stent implant (2023).  Her family history includes Anemia in her mother; Asthma in her brother; Hyperlipidemia in her mother; Hypertension in her mother; Kidney Transplant in her brother; Patient is unaware of any medical problems in her brother and father; Rheumatoid Arthritis in her maternal grandmother; Testicular cancer in her brother.  Sophie reports that she has never smoked. She has never been exposed to tobacco smoke. She has never used smokeless tobacco. She reports that she does not currently use alcohol. She reports that she does not use drugs.  Sophie has a current medication list which includes the following prescription(s): hydroxychloroquine, celecoxib, wegovy, symbicort, albuterol, montelukast, omeprazole, trazodone, sertraline, etonogestrel-ethinyl estradiol, and albuterol.  Sophie   Current Outpatient Medications   Medication Sig Dispense Refill   • hydroxychloroquine (PLAQUENIL) 200 MG tablet Take 1 tablet by mouth in the morning and 1 tablet in the evening. 60 tablet 3   • celecoxib (CeleBREX) 200 MG capsule Take 1 capsule by mouth daily. 30 capsule 4   • semaglutide-Weight Management (Wegovy) 0.25 MG/0.5ML injection Inject 0.25 mg into the skin every 7 days. Indications: OBESITY 6 mL 1   • Symbicort 160-4.5 MCG/ACT inhaler Inhale 2 puffs into the lungs in the morning and 2 puffs in the evening. 10.2 g 4   • albuterol (ACCUNEB) 0.63 MG/3ML nebulizer solution Take 3 mLs by nebulization every 6  hours as needed for Wheezing. 120 mL 4   • albuterol 108 (90 Base) MCG/ACT inhaler Inhale 2 puffs into the lungs every 4 hours as needed for Shortness of Breath or Wheezing. 18 g 2   • montelukast (SINGULAIR) 10 MG tablet Take 1 tablet by mouth nightly. 30 tablet 11   • omeprazole (PriLOSEC) 40 MG capsule Take 1 capsule by mouth daily. 30 capsule 3   • traZODone (DESYREL) 50 MG tablet Take 2 tablets by mouth nightly. 30 tablet 5   • sertraline (ZOLOFT) 50 MG tablet Take 1 tablet by mouth daily. 30 tablet 5   • etonogestrel-ethinyl estradiol (NUVARING) 0.12-0.015 MG/24HR vaginal ring INSERT 1 RING VAGINALLY FOR 3 WEEKS, REPLACE WITH NEW RING AFTER 7 RING FREE DAYS 4 each 3     No current facility-administered medications for this visit.     Sophie is allergic to penicillins and cephalexin.    Patient Active Problem List   Diagnosis   • Mild persistent asthma without complication (CMD)   • Kidney stone   • Normocytic anemia   • Morbid obesity with BMI of 40.0-44.9, adult  (CMD)   • Ureteric colic   • Hypercalcemia   • Hyperparathyroidism, primary  (CMD)   • Obesity (BMI 30-39.9)   • Hair loss   • Iron deficiency anemia due to chronic blood loss       Review of Systems   Constitutional:  Positive for malaise/fatigue.   Gastrointestinal:  Positive for constipation and heartburn.   Genitourinary:  Positive for frequency.   Musculoskeletal:  Positive for joint pain and myalgias.   Psychiatric/Behavioral:  Positive for memory loss. The patient is nervous/anxious and has insomnia.    All other systems reviewed and are negative.      Objective   Physical Exam  Constitutional:       Comments: Multiple piercings.  I told the patient those would need to be removed for surgery to decrease the risk of electrocautery causing a burn at the piercing.   HENT:      Head: Normocephalic.      Nose: No congestion.   Eyes:      General: No scleral icterus.  Cardiovascular:      Rate and Rhythm: Normal rate.   Pulmonary:      Effort:  Pulmonary effort is normal.   Abdominal:      General: Abdomen is flat.   Musculoskeletal:         General: Normal range of motion.   Skin:     General: Skin is warm.   Neurological:      General: No focal deficit present.      Mental Status: She is alert.   Psychiatric:         Mood and Affect: Mood normal.         Procedures    Ultrasound of the neck was performed demonstrating normal thyroid gland.  There is a large right inferior parathyroid adenoma measuring 25 x 8 x 11 mm just inferior to the right thyroid lobe.    Alonso Hernandez MD    Assessment   33-year-old female with primary hyperparathyroidism and elevated urine calcium with a history of kidney stones necessitating surgery.  She would benefit from parathyroidectomy.  risks/benefits of parathyroidectomy discussed in detail with patient.  typical recovery and possible complications discussed.  possible complications, including, but not limited to bleeding, pain, infection, persistent hyper or hypocalcemia, recurrent laryngeal nerve injury , injury to adjacent structure, need for additional surgery discussed.  5% chance of temporary hoarseness/change in voice and 1% chance of permanent hoarseness/change in voice discussed.  Patient consents.      parathyroidectomy scheduled for September 4, 2024.     Risks/benefits discussed with patient or patient surrogate

## 2024-09-06 ENCOUNTER — MED ADMIN CHARGE (OUTPATIENT)
Age: 34
End: 2024-09-06

## 2024-09-06 ENCOUNTER — APPOINTMENT (OUTPATIENT)
Dept: RHEUMATOLOGY | Facility: CLINIC | Age: 34
End: 2024-09-06
Payer: COMMERCIAL

## 2024-09-06 VITALS
RESPIRATION RATE: 14 BRPM | OXYGEN SATURATION: 99 % | SYSTOLIC BLOOD PRESSURE: 137 MMHG | HEART RATE: 67 BPM | TEMPERATURE: 97.8 F | DIASTOLIC BLOOD PRESSURE: 84 MMHG

## 2024-09-06 VITALS
DIASTOLIC BLOOD PRESSURE: 91 MMHG | SYSTOLIC BLOOD PRESSURE: 135 MMHG | TEMPERATURE: 98.6 F | HEART RATE: 77 BPM | RESPIRATION RATE: 15 BRPM | OXYGEN SATURATION: 98 %

## 2024-09-06 DIAGNOSIS — E53.8 DEFICIENCY OF OTHER SPECIFIED B GROUP VITAMINS: ICD-10-CM

## 2024-09-06 DIAGNOSIS — K50.90 CROHN'S DISEASE, UNSPECIFIED, W/OUT COMPLICATIONS: ICD-10-CM

## 2024-09-06 PROCEDURE — 36415 COLL VENOUS BLD VENIPUNCTURE: CPT

## 2024-09-06 PROCEDURE — 96413 CHEMO IV INFUSION 1 HR: CPT

## 2024-09-06 PROCEDURE — 96415 CHEMO IV INFUSION ADDL HR: CPT

## 2024-09-06 RX ORDER — INFLIXIMAB 100 MG/10ML
100 INJECTION, POWDER, LYOPHILIZED, FOR SOLUTION INTRAVENOUS
Qty: 1 | Refills: 0 | Status: COMPLETED | OUTPATIENT
Start: 2024-08-30

## 2024-09-06 RX ORDER — CYANOCOBALAMIN 1000 UG/ML
1000 INJECTION INTRAMUSCULAR; SUBCUTANEOUS
Qty: 0 | Refills: 0 | Status: COMPLETED | OUTPATIENT
Start: 2024-08-30

## 2024-09-06 NOTE — HISTORY OF PRESENT ILLNESS
[N/A] : N/A [Denies] : Denies [No] : No [Yes] : Yes [Declined] : Declined [24g] : 24g [Start Time: ___] : Medication Start Time: [unfilled] [End Time: ___] : Medication End Time: [unfilled] [IV discontinued. Intact. No signs or symptoms of IV complications noted. Time: ___] : IV discontinued. Intact. No signs or symptoms of IV complications noted. Time: [unfilled] [Patient  instructed to seek medical attention with signs and symptoms of adverse effects] : Patient  instructed to seek medical attention with signs and symptoms of adverse effects [Patient left unit in no acute distress] : Patient left unit in no acute distress [Medications administered as ordered and tolerated well.] : Medications administered as ordered and tolerated well. [Blood drawn at time of visit] : Blood drawn at time of visit [de-identified] : Right wrist [de-identified] : 11:50am

## 2024-09-07 LAB
ALBUMIN SERPL ELPH-MCNC: 4 G/DL
ALP BLD-CCNC: 61 U/L
ALT SERPL-CCNC: 15 U/L
ANION GAP SERPL CALC-SCNC: 11 MMOL/L
AST SERPL-CCNC: 17 U/L
BASOPHILS # BLD AUTO: 0.05 K/UL
BASOPHILS NFR BLD AUTO: 0.8 %
BILIRUB SERPL-MCNC: 0.6 MG/DL
BUN SERPL-MCNC: 13 MG/DL
CALCIUM SERPL-MCNC: 9 MG/DL
CHLORIDE SERPL-SCNC: 105 MMOL/L
CO2 SERPL-SCNC: 21 MMOL/L
CREAT SERPL-MCNC: 1 MG/DL
CRP SERPL-MCNC: <3 MG/L
EGFR: 100 ML/MIN/1.73M2
EOSINOPHIL # BLD AUTO: 0.2 K/UL
EOSINOPHIL NFR BLD AUTO: 3.1 %
GLUCOSE SERPL-MCNC: 103 MG/DL
HCT VFR BLD CALC: 43.4 %
HGB BLD-MCNC: 14.9 G/DL
IMM GRANULOCYTES NFR BLD AUTO: 0.6 %
LYMPHOCYTES # BLD AUTO: 2.05 K/UL
LYMPHOCYTES NFR BLD AUTO: 32 %
MAN DIFF?: NORMAL
MCHC RBC-ENTMCNC: 31.8 PG
MCHC RBC-ENTMCNC: 34.3 GM/DL
MCV RBC AUTO: 92.5 FL
MONOCYTES # BLD AUTO: 0.65 K/UL
MONOCYTES NFR BLD AUTO: 10.2 %
NEUTROPHILS # BLD AUTO: 3.41 K/UL
NEUTROPHILS NFR BLD AUTO: 53.3 %
PLATELET # BLD AUTO: 204 K/UL
POTASSIUM SERPL-SCNC: 4.2 MMOL/L
PROT SERPL-MCNC: 6.2 G/DL
RBC # BLD: 4.69 M/UL
RBC # FLD: 14.1 %
SODIUM SERPL-SCNC: 137 MMOL/L
WBC # FLD AUTO: 6.4 K/UL

## 2024-09-10 ENCOUNTER — APPOINTMENT (OUTPATIENT)
Dept: DERMATOLOGY | Facility: CLINIC | Age: 34
End: 2024-09-10

## 2024-10-07 ENCOUNTER — MED ADMIN CHARGE (OUTPATIENT)
Age: 34
End: 2024-10-07

## 2024-10-07 ENCOUNTER — APPOINTMENT (OUTPATIENT)
Dept: RHEUMATOLOGY | Facility: CLINIC | Age: 34
End: 2024-10-07
Payer: COMMERCIAL

## 2024-10-07 VITALS
OXYGEN SATURATION: 99 % | SYSTOLIC BLOOD PRESSURE: 127 MMHG | HEART RATE: 61 BPM | RESPIRATION RATE: 16 BRPM | DIASTOLIC BLOOD PRESSURE: 81 MMHG | TEMPERATURE: 98.3 F

## 2024-10-07 VITALS
TEMPERATURE: 98.3 F | RESPIRATION RATE: 15 BRPM | OXYGEN SATURATION: 98 % | HEART RATE: 82 BPM | DIASTOLIC BLOOD PRESSURE: 83 MMHG | SYSTOLIC BLOOD PRESSURE: 149 MMHG

## 2024-10-07 DIAGNOSIS — E53.8 DEFICIENCY OF OTHER SPECIFIED B GROUP VITAMINS: ICD-10-CM

## 2024-10-07 DIAGNOSIS — K50.90 CROHN'S DISEASE, UNSPECIFIED, W/OUT COMPLICATIONS: ICD-10-CM

## 2024-10-07 PROCEDURE — 36415 COLL VENOUS BLD VENIPUNCTURE: CPT

## 2024-10-07 PROCEDURE — 96413 CHEMO IV INFUSION 1 HR: CPT

## 2024-10-07 PROCEDURE — 96415 CHEMO IV INFUSION ADDL HR: CPT

## 2024-10-08 ENCOUNTER — APPOINTMENT (OUTPATIENT)
Dept: GASTROENTEROLOGY | Facility: CLINIC | Age: 34
End: 2024-10-08
Payer: COMMERCIAL

## 2024-10-08 DIAGNOSIS — K50.90 CROHN'S DISEASE, UNSPECIFIED, W/OUT COMPLICATIONS: ICD-10-CM

## 2024-10-08 DIAGNOSIS — Z92.25 PERSONAL HISTORY OF IMMUNOSUPRESSION THERAPY: ICD-10-CM

## 2024-10-08 LAB
ALBUMIN SERPL ELPH-MCNC: 4.1 G/DL
ALP BLD-CCNC: 54 U/L
ALT SERPL-CCNC: 22 U/L
ANION GAP SERPL CALC-SCNC: 11 MMOL/L
AST SERPL-CCNC: 20 U/L
BILIRUB SERPL-MCNC: 0.4 MG/DL
BUN SERPL-MCNC: 11 MG/DL
CALCIUM SERPL-MCNC: 9.7 MG/DL
CHLORIDE SERPL-SCNC: 106 MMOL/L
CO2 SERPL-SCNC: 25 MMOL/L
CREAT SERPL-MCNC: 1.08 MG/DL
CRP SERPL-MCNC: <3 MG/L
EGFR: 91 ML/MIN/1.73M2
GLUCOSE SERPL-MCNC: 90 MG/DL
POTASSIUM SERPL-SCNC: 5.1 MMOL/L
PROT SERPL-MCNC: 6.7 G/DL
SODIUM SERPL-SCNC: 142 MMOL/L

## 2024-10-08 PROCEDURE — G2211 COMPLEX E/M VISIT ADD ON: CPT | Mod: NC

## 2024-10-08 PROCEDURE — 99213 OFFICE O/P EST LOW 20 MIN: CPT

## 2024-11-04 ENCOUNTER — MED ADMIN CHARGE (OUTPATIENT)
Age: 34
End: 2024-11-04

## 2024-11-04 ENCOUNTER — APPOINTMENT (OUTPATIENT)
Dept: RHEUMATOLOGY | Facility: CLINIC | Age: 34
End: 2024-11-04
Payer: COMMERCIAL

## 2024-11-04 VITALS
HEART RATE: 68 BPM | RESPIRATION RATE: 15 BRPM | TEMPERATURE: 98.2 F | SYSTOLIC BLOOD PRESSURE: 127 MMHG | OXYGEN SATURATION: 98 % | DIASTOLIC BLOOD PRESSURE: 73 MMHG

## 2024-11-04 DIAGNOSIS — E53.8 DEFICIENCY OF OTHER SPECIFIED B GROUP VITAMINS: ICD-10-CM

## 2024-11-04 DIAGNOSIS — K50.90 CROHN'S DISEASE, UNSPECIFIED, W/OUT COMPLICATIONS: ICD-10-CM

## 2024-11-04 PROCEDURE — 96415 CHEMO IV INFUSION ADDL HR: CPT

## 2024-11-04 PROCEDURE — 36415 COLL VENOUS BLD VENIPUNCTURE: CPT

## 2024-11-04 PROCEDURE — 96413 CHEMO IV INFUSION 1 HR: CPT

## 2024-11-04 RX ORDER — CYANOCOBALAMIN 1000 UG/ML
1000 INJECTION INTRAMUSCULAR; SUBCUTANEOUS
Qty: 0 | Refills: 0 | Status: COMPLETED | OUTPATIENT
Start: 2024-11-01

## 2024-11-04 RX ORDER — INFLIXIMAB 100 MG/10ML
100 INJECTION, POWDER, LYOPHILIZED, FOR SOLUTION INTRAVENOUS
Qty: 1 | Refills: 0 | Status: COMPLETED | OUTPATIENT
Start: 2024-11-01

## 2024-11-05 LAB
ALBUMIN SERPL ELPH-MCNC: 4 G/DL
ALP BLD-CCNC: 61 U/L
ALT SERPL-CCNC: 18 U/L
ANION GAP SERPL CALC-SCNC: 12 MMOL/L
AST SERPL-CCNC: 20 U/L
BASOPHILS # BLD AUTO: 0.05 K/UL
BASOPHILS NFR BLD AUTO: 0.5 %
BILIRUB SERPL-MCNC: 0.2 MG/DL
BUN SERPL-MCNC: 10 MG/DL
CALCIUM SERPL-MCNC: 9.9 MG/DL
CHLORIDE SERPL-SCNC: 102 MMOL/L
CO2 SERPL-SCNC: 25 MMOL/L
CREAT SERPL-MCNC: 1.1 MG/DL
CRP SERPL-MCNC: <3 MG/L
EGFR: 89 ML/MIN/1.73M2
EOSINOPHIL # BLD AUTO: 0.22 K/UL
EOSINOPHIL NFR BLD AUTO: 2.4 %
GLUCOSE SERPL-MCNC: 97 MG/DL
HCT VFR BLD CALC: 45.5 %
HGB BLD-MCNC: 15.5 G/DL
IMM GRANULOCYTES NFR BLD AUTO: 0.4 %
LYMPHOCYTES # BLD AUTO: 1.58 K/UL
LYMPHOCYTES NFR BLD AUTO: 17.2 %
MAN DIFF?: NORMAL
MCHC RBC-ENTMCNC: 31.3 PG
MCHC RBC-ENTMCNC: 34.1 G/DL
MCV RBC AUTO: 91.7 FL
MONOCYTES # BLD AUTO: 0.69 K/UL
MONOCYTES NFR BLD AUTO: 7.5 %
NEUTROPHILS # BLD AUTO: 6.58 K/UL
NEUTROPHILS NFR BLD AUTO: 72 %
PLATELET # BLD AUTO: 227 K/UL
POTASSIUM SERPL-SCNC: 4.5 MMOL/L
PROT SERPL-MCNC: 6.8 G/DL
RBC # BLD: 4.96 M/UL
RBC # FLD: 13.2 %
SODIUM SERPL-SCNC: 139 MMOL/L
WBC # FLD AUTO: 9.16 K/UL

## 2024-11-12 ENCOUNTER — APPOINTMENT (OUTPATIENT)
Dept: GASTROENTEROLOGY | Facility: HOSPITAL | Age: 34
End: 2024-11-12

## 2024-11-12 ENCOUNTER — OUTPATIENT (OUTPATIENT)
Dept: OUTPATIENT SERVICES | Facility: HOSPITAL | Age: 34
LOS: 1 days | Discharge: HOME | End: 2024-11-12
Payer: COMMERCIAL

## 2024-11-12 VITALS — HEIGHT: 71 IN | WEIGHT: 179.9 LBS

## 2024-11-12 DIAGNOSIS — Z98.890 OTHER SPECIFIED POSTPROCEDURAL STATES: Chronic | ICD-10-CM

## 2024-11-12 PROCEDURE — 45378 DIAGNOSTIC COLONOSCOPY: CPT

## 2024-12-02 ENCOUNTER — MED ADMIN CHARGE (OUTPATIENT)
Age: 34
End: 2024-12-02

## 2024-12-02 ENCOUNTER — APPOINTMENT (OUTPATIENT)
Dept: RHEUMATOLOGY | Facility: CLINIC | Age: 34
End: 2024-12-02
Payer: COMMERCIAL

## 2024-12-02 VITALS
DIASTOLIC BLOOD PRESSURE: 93 MMHG | SYSTOLIC BLOOD PRESSURE: 151 MMHG | RESPIRATION RATE: 15 BRPM | HEART RATE: 86 BPM | OXYGEN SATURATION: 95 % | TEMPERATURE: 98.4 F

## 2024-12-02 VITALS
RESPIRATION RATE: 15 BRPM | TEMPERATURE: 98.1 F | SYSTOLIC BLOOD PRESSURE: 141 MMHG | HEART RATE: 77 BPM | DIASTOLIC BLOOD PRESSURE: 85 MMHG | OXYGEN SATURATION: 96 %

## 2024-12-02 DIAGNOSIS — D50.9 IRON DEFICIENCY ANEMIA, UNSPECIFIED: ICD-10-CM

## 2024-12-02 DIAGNOSIS — K50.90 CROHN'S DISEASE, UNSPECIFIED, W/OUT COMPLICATIONS: ICD-10-CM

## 2024-12-02 PROCEDURE — 36415 COLL VENOUS BLD VENIPUNCTURE: CPT

## 2024-12-02 PROCEDURE — 96415 CHEMO IV INFUSION ADDL HR: CPT

## 2024-12-02 PROCEDURE — 96413 CHEMO IV INFUSION 1 HR: CPT

## 2024-12-03 LAB
ALBUMIN SERPL ELPH-MCNC: 4.3 G/DL
ALP BLD-CCNC: 66 U/L
ALT SERPL-CCNC: 30 U/L
ANION GAP SERPL CALC-SCNC: 16 MMOL/L
AST SERPL-CCNC: 21 U/L
BASOPHILS # BLD AUTO: 0.06 K/UL
BASOPHILS NFR BLD AUTO: 0.8 %
BILIRUB SERPL-MCNC: 0.7 MG/DL
BUN SERPL-MCNC: 17 MG/DL
CALCIUM SERPL-MCNC: 10.1 MG/DL
CHLORIDE SERPL-SCNC: 100 MMOL/L
CO2 SERPL-SCNC: 23 MMOL/L
CREAT SERPL-MCNC: 1.08 MG/DL
CRP SERPL-MCNC: <3 MG/L
EGFR: 91 ML/MIN/1.73M2
EOSINOPHIL # BLD AUTO: 0.18 K/UL
EOSINOPHIL NFR BLD AUTO: 2.4 %
GLUCOSE SERPL-MCNC: 112 MG/DL
HCT VFR BLD CALC: 44 %
HGB BLD-MCNC: 15.9 G/DL
IMM GRANULOCYTES NFR BLD AUTO: 0.8 %
LYMPHOCYTES # BLD AUTO: 2.33 K/UL
LYMPHOCYTES NFR BLD AUTO: 31.1 %
MAN DIFF?: NORMAL
MCHC RBC-ENTMCNC: 32.1 PG
MCHC RBC-ENTMCNC: 36.1 G/DL
MCV RBC AUTO: 88.7 FL
MONOCYTES # BLD AUTO: 0.7 K/UL
MONOCYTES NFR BLD AUTO: 9.3 %
NEUTROPHILS # BLD AUTO: 4.16 K/UL
NEUTROPHILS NFR BLD AUTO: 55.6 %
PLATELET # BLD AUTO: 249 K/UL
POTASSIUM SERPL-SCNC: 4.4 MMOL/L
PROT SERPL-MCNC: 7.2 G/DL
RBC # BLD: 4.96 M/UL
RBC # FLD: 13.2 %
SODIUM SERPL-SCNC: 139 MMOL/L
WBC # FLD AUTO: 7.49 K/UL

## 2024-12-31 ENCOUNTER — TRANSCRIPTION ENCOUNTER (OUTPATIENT)
Age: 34
End: 2024-12-31

## 2025-01-06 ENCOUNTER — APPOINTMENT (OUTPATIENT)
Dept: RHEUMATOLOGY | Facility: CLINIC | Age: 35
End: 2025-01-06
Payer: COMMERCIAL

## 2025-01-06 ENCOUNTER — MED ADMIN CHARGE (OUTPATIENT)
Age: 35
End: 2025-01-06

## 2025-01-06 VITALS
HEART RATE: 97 BPM | RESPIRATION RATE: 15 BRPM | OXYGEN SATURATION: 98 % | DIASTOLIC BLOOD PRESSURE: 97 MMHG | SYSTOLIC BLOOD PRESSURE: 136 MMHG | TEMPERATURE: 98.4 F

## 2025-01-06 DIAGNOSIS — K50.90 CROHN'S DISEASE, UNSPECIFIED, W/OUT COMPLICATIONS: ICD-10-CM

## 2025-01-06 DIAGNOSIS — D50.9 IRON DEFICIENCY ANEMIA, UNSPECIFIED: ICD-10-CM

## 2025-01-06 PROCEDURE — 96415 CHEMO IV INFUSION ADDL HR: CPT

## 2025-01-06 PROCEDURE — 96413 CHEMO IV INFUSION 1 HR: CPT

## 2025-01-06 PROCEDURE — 36415 COLL VENOUS BLD VENIPUNCTURE: CPT

## 2025-01-06 RX ORDER — INFLIXIMAB 100 MG/10ML
100 INJECTION, POWDER, LYOPHILIZED, FOR SOLUTION INTRAVENOUS
Qty: 1 | Refills: 0 | Status: COMPLETED | OUTPATIENT
Start: 2024-12-31

## 2025-01-06 RX ORDER — CYANOCOBALAMIN 1000 UG/ML
1000 INJECTION, SOLUTION INTRAMUSCULAR; SUBCUTANEOUS
Qty: 0 | Refills: 0 | Status: COMPLETED | OUTPATIENT
Start: 2025-01-02

## 2025-01-07 LAB
ALBUMIN SERPL ELPH-MCNC: 4.2 G/DL
ALP BLD-CCNC: 67 U/L
ALT SERPL-CCNC: 28 U/L
ANION GAP SERPL CALC-SCNC: 13 MMOL/L
AST SERPL-CCNC: 19 U/L
BASOPHILS # BLD AUTO: 0.08 K/UL
BASOPHILS NFR BLD AUTO: 1 %
BILIRUB SERPL-MCNC: 0.7 MG/DL
BUN SERPL-MCNC: 14 MG/DL
CALCIUM SERPL-MCNC: 10 MG/DL
CHLORIDE SERPL-SCNC: 102 MMOL/L
CO2 SERPL-SCNC: 23 MMOL/L
CREAT SERPL-MCNC: 1.14 MG/DL
CRP SERPL-MCNC: <3 MG/L
EGFR: 85 ML/MIN/1.73M2
EOSINOPHIL # BLD AUTO: 0.33 K/UL
EOSINOPHIL NFR BLD AUTO: 4.1 %
GLUCOSE SERPL-MCNC: 105 MG/DL
HCT VFR BLD CALC: 45.2 %
HGB BLD-MCNC: 15.7 G/DL
IMM GRANULOCYTES NFR BLD AUTO: 0.6 %
LYMPHOCYTES # BLD AUTO: 2.53 K/UL
LYMPHOCYTES NFR BLD AUTO: 31.1 %
MAN DIFF?: NORMAL
MCHC RBC-ENTMCNC: 31.2 PG
MCHC RBC-ENTMCNC: 34.7 G/DL
MCV RBC AUTO: 89.9 FL
MONOCYTES # BLD AUTO: 0.75 K/UL
MONOCYTES NFR BLD AUTO: 9.2 %
NEUTROPHILS # BLD AUTO: 4.4 K/UL
NEUTROPHILS NFR BLD AUTO: 54 %
PLATELET # BLD AUTO: 213 K/UL
POTASSIUM SERPL-SCNC: 4.1 MMOL/L
PROT SERPL-MCNC: 7.1 G/DL
RBC # BLD: 5.03 M/UL
RBC # FLD: 14.1 %
SODIUM SERPL-SCNC: 137 MMOL/L
WBC # FLD AUTO: 8.14 K/UL

## 2025-02-03 ENCOUNTER — APPOINTMENT (OUTPATIENT)
Dept: RHEUMATOLOGY | Facility: CLINIC | Age: 35
End: 2025-02-03
Payer: COMMERCIAL

## 2025-02-03 ENCOUNTER — MED ADMIN CHARGE (OUTPATIENT)
Age: 35
End: 2025-02-03

## 2025-02-03 VITALS
RESPIRATION RATE: 15 BRPM | DIASTOLIC BLOOD PRESSURE: 92 MMHG | OXYGEN SATURATION: 97 % | HEART RATE: 79 BPM | TEMPERATURE: 96.7 F | SYSTOLIC BLOOD PRESSURE: 144 MMHG

## 2025-02-03 VITALS
HEART RATE: 56 BPM | SYSTOLIC BLOOD PRESSURE: 136 MMHG | RESPIRATION RATE: 15 BRPM | DIASTOLIC BLOOD PRESSURE: 81 MMHG | TEMPERATURE: 98.1 F | OXYGEN SATURATION: 98 %

## 2025-02-03 DIAGNOSIS — K50.90 CROHN'S DISEASE, UNSPECIFIED, W/OUT COMPLICATIONS: ICD-10-CM

## 2025-02-03 PROCEDURE — 96413 CHEMO IV INFUSION 1 HR: CPT

## 2025-02-03 PROCEDURE — 36415 COLL VENOUS BLD VENIPUNCTURE: CPT

## 2025-02-03 PROCEDURE — 96415 CHEMO IV INFUSION ADDL HR: CPT

## 2025-02-03 RX ORDER — INFLIXIMAB 100 MG/10ML
100 INJECTION, POWDER, LYOPHILIZED, FOR SOLUTION INTRAVENOUS
Qty: 1 | Refills: 0 | Status: COMPLETED | OUTPATIENT
Start: 2025-01-29

## 2025-02-04 LAB
ALBUMIN SERPL ELPH-MCNC: 4 G/DL
ALP BLD-CCNC: 55 U/L
ALT SERPL-CCNC: 16 U/L
ANION GAP SERPL CALC-SCNC: 12 MMOL/L
AST SERPL-CCNC: 15 U/L
BASOPHILS # BLD AUTO: 0.04 K/UL
BASOPHILS NFR BLD AUTO: 0.6 %
BILIRUB SERPL-MCNC: 0.3 MG/DL
BUN SERPL-MCNC: 16 MG/DL
CALCIUM SERPL-MCNC: 9.4 MG/DL
CHLORIDE SERPL-SCNC: 105 MMOL/L
CO2 SERPL-SCNC: 23 MMOL/L
CREAT SERPL-MCNC: 1.14 MG/DL
CRP SERPL-MCNC: <3 MG/L
EGFR: 85 ML/MIN/1.73M2
EOSINOPHIL # BLD AUTO: 0.2 K/UL
EOSINOPHIL NFR BLD AUTO: 3.1 %
GLUCOSE SERPL-MCNC: 100 MG/DL
HCT VFR BLD CALC: 43.9 %
HGB BLD-MCNC: 15.1 G/DL
IMM GRANULOCYTES NFR BLD AUTO: 0.3 %
LYMPHOCYTES # BLD AUTO: 2.23 K/UL
LYMPHOCYTES NFR BLD AUTO: 34.3 %
MAN DIFF?: NORMAL
MCHC RBC-ENTMCNC: 31.5 PG
MCHC RBC-ENTMCNC: 34.4 G/DL
MCV RBC AUTO: 91.5 FL
MONOCYTES # BLD AUTO: 0.55 K/UL
MONOCYTES NFR BLD AUTO: 8.5 %
NEUTROPHILS # BLD AUTO: 3.46 K/UL
NEUTROPHILS NFR BLD AUTO: 53.2 %
PLATELET # BLD AUTO: 220 K/UL
POTASSIUM SERPL-SCNC: 4.2 MMOL/L
PROT SERPL-MCNC: 6.6 G/DL
RBC # BLD: 4.8 M/UL
RBC # FLD: 13.2 %
SODIUM SERPL-SCNC: 140 MMOL/L
VIT B12 SERPL-MCNC: 486 PG/ML
WBC # FLD AUTO: 6.5 K/UL

## 2025-03-04 ENCOUNTER — APPOINTMENT (OUTPATIENT)
Dept: RHEUMATOLOGY | Facility: CLINIC | Age: 35
End: 2025-03-04
Payer: COMMERCIAL

## 2025-03-04 ENCOUNTER — MED ADMIN CHARGE (OUTPATIENT)
Age: 35
End: 2025-03-04

## 2025-03-04 VITALS
HEART RATE: 64 BPM | RESPIRATION RATE: 15 BRPM | SYSTOLIC BLOOD PRESSURE: 134 MMHG | TEMPERATURE: 98.3 F | DIASTOLIC BLOOD PRESSURE: 90 MMHG | OXYGEN SATURATION: 98 %

## 2025-03-04 DIAGNOSIS — K50.90 CROHN'S DISEASE, UNSPECIFIED, W/OUT COMPLICATIONS: ICD-10-CM

## 2025-03-04 DIAGNOSIS — D50.9 IRON DEFICIENCY ANEMIA, UNSPECIFIED: ICD-10-CM

## 2025-03-04 PROCEDURE — 96415 CHEMO IV INFUSION ADDL HR: CPT

## 2025-03-04 PROCEDURE — 96413 CHEMO IV INFUSION 1 HR: CPT

## 2025-03-04 PROCEDURE — 36415 COLL VENOUS BLD VENIPUNCTURE: CPT

## 2025-03-04 RX ORDER — CYANOCOBALAMIN 1000 UG/ML
1000 INJECTION, SOLUTION INTRAMUSCULAR; SUBCUTANEOUS
Qty: 0 | Refills: 0 | Status: COMPLETED | OUTPATIENT
Start: 2025-02-25

## 2025-03-04 RX ORDER — INFLIXIMAB 100 MG/10ML
100 INJECTION, POWDER, LYOPHILIZED, FOR SOLUTION INTRAVENOUS
Qty: 1 | Refills: 0 | Status: COMPLETED | OUTPATIENT
Start: 2025-02-25

## 2025-03-05 LAB
BASOPHILS # BLD AUTO: 0.06 K/UL
BASOPHILS NFR BLD AUTO: 0.7 %
EOSINOPHIL # BLD AUTO: 0.44 K/UL
EOSINOPHIL NFR BLD AUTO: 5.5 %
HCT VFR BLD CALC: 44.8 %
HGB BLD-MCNC: 14.9 G/DL
IMM GRANULOCYTES NFR BLD AUTO: 0.7 %
LYMPHOCYTES # BLD AUTO: 2.4 K/UL
LYMPHOCYTES NFR BLD AUTO: 29.8 %
MAN DIFF?: NORMAL
MCHC RBC-ENTMCNC: 30.8 PG
MCHC RBC-ENTMCNC: 33.3 G/DL
MCV RBC AUTO: 92.6 FL
MONOCYTES # BLD AUTO: 0.73 K/UL
MONOCYTES NFR BLD AUTO: 9.1 %
NEUTROPHILS # BLD AUTO: 4.36 K/UL
NEUTROPHILS NFR BLD AUTO: 54.2 %
PLATELET # BLD AUTO: 247 K/UL
RBC # BLD: 4.84 M/UL
RBC # FLD: 13.9 %
WBC # FLD AUTO: 8.05 K/UL

## 2025-03-06 LAB
ALBUMIN SERPL ELPH-MCNC: 3.9 G/DL
ALP BLD-CCNC: 59 U/L
ALT SERPL-CCNC: 27 U/L
ANION GAP SERPL CALC-SCNC: 13 MMOL/L
AST SERPL-CCNC: 16 U/L
BILIRUB SERPL-MCNC: 0.2 MG/DL
BUN SERPL-MCNC: 15 MG/DL
CALCIUM SERPL-MCNC: 9.2 MG/DL
CHLORIDE SERPL-SCNC: 106 MMOL/L
CO2 SERPL-SCNC: 24 MMOL/L
CREAT SERPL-MCNC: 0.93 MG/DL
CRP SERPL-MCNC: <3 MG/L
EGFRCR SERPLBLD CKD-EPI 2021: 108 ML/MIN/1.73M2
GLUCOSE SERPL-MCNC: 104 MG/DL
POTASSIUM SERPL-SCNC: 4.7 MMOL/L
PROT SERPL-MCNC: 6.5 G/DL
SODIUM SERPL-SCNC: 144 MMOL/L

## 2025-04-02 ENCOUNTER — MED ADMIN CHARGE (OUTPATIENT)
Age: 35
End: 2025-04-02

## 2025-04-02 ENCOUNTER — APPOINTMENT (OUTPATIENT)
Dept: RHEUMATOLOGY | Facility: CLINIC | Age: 35
End: 2025-04-02
Payer: COMMERCIAL

## 2025-04-02 VITALS
TEMPERATURE: 98.1 F | OXYGEN SATURATION: 99 % | SYSTOLIC BLOOD PRESSURE: 147 MMHG | DIASTOLIC BLOOD PRESSURE: 82 MMHG | RESPIRATION RATE: 15 BRPM | HEART RATE: 74 BPM

## 2025-04-02 VITALS
OXYGEN SATURATION: 99 % | HEART RATE: 61 BPM | RESPIRATION RATE: 15 BRPM | DIASTOLIC BLOOD PRESSURE: 80 MMHG | SYSTOLIC BLOOD PRESSURE: 131 MMHG

## 2025-04-02 DIAGNOSIS — K50.90 CROHN'S DISEASE, UNSPECIFIED, W/OUT COMPLICATIONS: ICD-10-CM

## 2025-04-02 DIAGNOSIS — D50.9 IRON DEFICIENCY ANEMIA, UNSPECIFIED: ICD-10-CM

## 2025-04-02 PROCEDURE — 96415 CHEMO IV INFUSION ADDL HR: CPT

## 2025-04-02 PROCEDURE — 96413 CHEMO IV INFUSION 1 HR: CPT

## 2025-04-02 PROCEDURE — 36415 COLL VENOUS BLD VENIPUNCTURE: CPT

## 2025-04-02 RX ORDER — INFLIXIMAB 100 MG/10ML
100 INJECTION, POWDER, LYOPHILIZED, FOR SOLUTION INTRAVENOUS
Qty: 1 | Refills: 0 | Status: COMPLETED | OUTPATIENT
Start: 2025-03-26

## 2025-04-02 RX ORDER — CYANOCOBALAMIN 1000 UG/ML
1000 INJECTION, SOLUTION INTRAMUSCULAR; SUBCUTANEOUS
Qty: 0 | Refills: 0 | Status: COMPLETED | OUTPATIENT
Start: 2025-03-26

## 2025-04-03 ENCOUNTER — TRANSCRIPTION ENCOUNTER (OUTPATIENT)
Age: 35
End: 2025-04-03

## 2025-04-04 LAB
ALBUMIN SERPL ELPH-MCNC: 4.2 G/DL
ALP BLD-CCNC: 58 U/L
ALT SERPL-CCNC: 22 U/L
ANION GAP SERPL CALC-SCNC: 12 MMOL/L
AST SERPL-CCNC: 20 U/L
BASOPHILS # BLD AUTO: 0.05 K/UL
BASOPHILS NFR BLD AUTO: 0.7 %
BILIRUB SERPL-MCNC: 0.4 MG/DL
BUN SERPL-MCNC: 10 MG/DL
CALCIUM SERPL-MCNC: 9.9 MG/DL
CHLORIDE SERPL-SCNC: 103 MMOL/L
CO2 SERPL-SCNC: 22 MMOL/L
CREAT SERPL-MCNC: 1.03 MG/DL
CRP SERPL-MCNC: <3 MG/L
EGFRCR SERPLBLD CKD-EPI 2021: 96 ML/MIN/1.73M2
EOSINOPHIL # BLD AUTO: 0.31 K/UL
EOSINOPHIL NFR BLD AUTO: 4.3 %
GLUCOSE SERPL-MCNC: 99 MG/DL
HCT VFR BLD CALC: 45 %
HGB BLD-MCNC: 15.6 G/DL
IMM GRANULOCYTES NFR BLD AUTO: 0.6 %
LYMPHOCYTES # BLD AUTO: 2.37 K/UL
LYMPHOCYTES NFR BLD AUTO: 33.1 %
MAN DIFF?: NORMAL
MCHC RBC-ENTMCNC: 31.5 PG
MCHC RBC-ENTMCNC: 34.7 G/DL
MCV RBC AUTO: 90.7 FL
MONOCYTES # BLD AUTO: 0.63 K/UL
MONOCYTES NFR BLD AUTO: 8.8 %
NEUTROPHILS # BLD AUTO: 3.77 K/UL
NEUTROPHILS NFR BLD AUTO: 52.5 %
PLATELET # BLD AUTO: 243 K/UL
POTASSIUM SERPL-SCNC: 4.7 MMOL/L
PROT SERPL-MCNC: 6.8 G/DL
RBC # BLD: 4.96 M/UL
RBC # FLD: 13.6 %
SODIUM SERPL-SCNC: 137 MMOL/L
WBC # FLD AUTO: 7.17 K/UL

## 2025-04-08 ENCOUNTER — TRANSCRIPTION ENCOUNTER (OUTPATIENT)
Age: 35
End: 2025-04-08

## 2025-04-29 ENCOUNTER — MED ADMIN CHARGE (OUTPATIENT)
Age: 35
End: 2025-04-29

## 2025-04-29 ENCOUNTER — APPOINTMENT (OUTPATIENT)
Dept: RHEUMATOLOGY | Facility: CLINIC | Age: 35
End: 2025-04-29
Payer: COMMERCIAL

## 2025-04-29 VITALS
SYSTOLIC BLOOD PRESSURE: 146 MMHG | HEART RATE: 77 BPM | OXYGEN SATURATION: 98 % | RESPIRATION RATE: 15 BRPM | TEMPERATURE: 98.4 F | DIASTOLIC BLOOD PRESSURE: 100 MMHG

## 2025-04-29 VITALS
OXYGEN SATURATION: 100 % | HEART RATE: 62 BPM | DIASTOLIC BLOOD PRESSURE: 93 MMHG | SYSTOLIC BLOOD PRESSURE: 137 MMHG | RESPIRATION RATE: 15 BRPM

## 2025-04-29 DIAGNOSIS — K50.90 CROHN'S DISEASE, UNSPECIFIED, W/OUT COMPLICATIONS: ICD-10-CM

## 2025-04-29 DIAGNOSIS — D50.9 IRON DEFICIENCY ANEMIA, UNSPECIFIED: ICD-10-CM

## 2025-04-29 PROCEDURE — 96415 CHEMO IV INFUSION ADDL HR: CPT

## 2025-04-29 PROCEDURE — 96413 CHEMO IV INFUSION 1 HR: CPT

## 2025-04-29 PROCEDURE — 36415 COLL VENOUS BLD VENIPUNCTURE: CPT

## 2025-04-29 RX ORDER — CYANOCOBALAMIN 1000 UG/ML
1000 INJECTION, SOLUTION INTRAMUSCULAR; SUBCUTANEOUS
Qty: 0 | Refills: 0 | Status: COMPLETED | OUTPATIENT
Start: 2025-04-24

## 2025-04-29 RX ORDER — INFLIXIMAB 100 MG/10ML
100 INJECTION, POWDER, LYOPHILIZED, FOR SOLUTION INTRAVENOUS
Qty: 1 | Refills: 0 | Status: COMPLETED | OUTPATIENT
Start: 2025-04-24

## 2025-04-30 LAB
ALBUMIN SERPL ELPH-MCNC: 4.2 G/DL
ALP BLD-CCNC: 63 U/L
ALT SERPL-CCNC: 28 U/L
ANION GAP SERPL CALC-SCNC: 12 MMOL/L
AST SERPL-CCNC: 28 U/L
BASOPHILS # BLD AUTO: 0.1 K/UL
BASOPHILS NFR BLD AUTO: 1.1 %
BILIRUB SERPL-MCNC: 0.5 MG/DL
BUN SERPL-MCNC: 14 MG/DL
CALCIUM SERPL-MCNC: 9.2 MG/DL
CHLORIDE SERPL-SCNC: 104 MMOL/L
CO2 SERPL-SCNC: 22 MMOL/L
CREAT SERPL-MCNC: 1.08 MG/DL
CRP SERPL-MCNC: <3 MG/L
EGFRCR SERPLBLD CKD-EPI 2021: 91 ML/MIN/1.73M2
EOSINOPHIL # BLD AUTO: 0.4 K/UL
EOSINOPHIL NFR BLD AUTO: 4.2 %
GLUCOSE SERPL-MCNC: 95 MG/DL
HCT VFR BLD CALC: 44.4 %
HGB BLD-MCNC: 15.5 G/DL
IMM GRANULOCYTES NFR BLD AUTO: 0.3 %
LYMPHOCYTES # BLD AUTO: 2.29 K/UL
LYMPHOCYTES NFR BLD AUTO: 24.3 %
MAN DIFF?: NORMAL
MCHC RBC-ENTMCNC: 30.6 PG
MCHC RBC-ENTMCNC: 34.9 G/DL
MCV RBC AUTO: 87.7 FL
MONOCYTES # BLD AUTO: 0.8 K/UL
MONOCYTES NFR BLD AUTO: 8.5 %
NEUTROPHILS # BLD AUTO: 5.81 K/UL
NEUTROPHILS NFR BLD AUTO: 61.6 %
PLATELET # BLD AUTO: 260 K/UL
POTASSIUM SERPL-SCNC: 4.8 MMOL/L
PROT SERPL-MCNC: 6.9 G/DL
RBC # BLD: 5.06 M/UL
RBC # FLD: 13.8 %
SODIUM SERPL-SCNC: 138 MMOL/L
WBC # FLD AUTO: 9.43 K/UL

## 2025-05-14 ENCOUNTER — APPOINTMENT (OUTPATIENT)
Dept: GASTROENTEROLOGY | Facility: CLINIC | Age: 35
End: 2025-05-14
Payer: COMMERCIAL

## 2025-05-14 ENCOUNTER — NON-APPOINTMENT (OUTPATIENT)
Age: 35
End: 2025-05-14

## 2025-05-14 VITALS
TEMPERATURE: 97.2 F | RESPIRATION RATE: 16 BRPM | HEART RATE: 74 BPM | WEIGHT: 189.6 LBS | OXYGEN SATURATION: 100 % | DIASTOLIC BLOOD PRESSURE: 86 MMHG | SYSTOLIC BLOOD PRESSURE: 120 MMHG | HEIGHT: 71 IN | BODY MASS INDEX: 26.54 KG/M2

## 2025-05-14 DIAGNOSIS — Z88.9 ALLERGY STATUS TO UNSPECIFIED DRUGS, MEDICAMENTS AND BIOLOGICAL SUBSTANCES: ICD-10-CM

## 2025-05-14 DIAGNOSIS — Z91.09 OTHER ALLERGY STATUS, OTHER THAN TO DRUGS AND BIOLOGICAL SUBSTANCES: ICD-10-CM

## 2025-05-14 PROCEDURE — 99215 OFFICE O/P EST HI 40 MIN: CPT

## 2025-05-15 LAB
FERRITIN SERPL-MCNC: 73 NG/ML
IRON SATN MFR SERPL: 39 %
IRON SERPL-MCNC: 166 UG/DL
TIBC SERPL-MCNC: 430 UG/DL
UIBC SERPL-MCNC: 264 UG/DL

## 2025-06-04 ENCOUNTER — APPOINTMENT (OUTPATIENT)
Dept: DERMATOLOGY | Facility: CLINIC | Age: 35
End: 2025-06-04
Payer: COMMERCIAL

## 2025-06-04 DIAGNOSIS — L57.8 OTHER SKIN CHANGES DUE TO CHRONIC EXPOSURE TO NONIONIZING RADIATION: ICD-10-CM

## 2025-06-04 DIAGNOSIS — D48.5 NEOPLASM OF UNCERTAIN BEHAVIOR OF SKIN: ICD-10-CM

## 2025-06-04 DIAGNOSIS — D22.5 MELANOCYTIC NEVI OF UNSPECIFIED UPPER LIMB, INCLUDING SHOULDER: ICD-10-CM

## 2025-06-04 DIAGNOSIS — D22.60 MELANOCYTIC NEVI OF UNSPECIFIED UPPER LIMB, INCLUDING SHOULDER: ICD-10-CM

## 2025-06-04 DIAGNOSIS — D22.70 MELANOCYTIC NEVI OF UNSPECIFIED UPPER LIMB, INCLUDING SHOULDER: ICD-10-CM

## 2025-06-04 PROCEDURE — 11102 TANGNTL BX SKIN SINGLE LES: CPT

## 2025-06-04 PROCEDURE — 99203 OFFICE O/P NEW LOW 30 MIN: CPT | Mod: 25

## 2025-06-09 ENCOUNTER — NON-APPOINTMENT (OUTPATIENT)
Age: 35
End: 2025-06-09

## 2025-06-09 LAB — DERMATOLOGY BIOPSY: NORMAL

## 2025-06-11 ENCOUNTER — APPOINTMENT (OUTPATIENT)
Dept: RHEUMATOLOGY | Facility: CLINIC | Age: 35
End: 2025-06-11
Payer: COMMERCIAL

## 2025-06-11 ENCOUNTER — MED ADMIN CHARGE (OUTPATIENT)
Age: 35
End: 2025-06-11

## 2025-06-11 VITALS
HEART RATE: 70 BPM | RESPIRATION RATE: 15 BRPM | SYSTOLIC BLOOD PRESSURE: 124 MMHG | OXYGEN SATURATION: 100 % | TEMPERATURE: 98.1 F | DIASTOLIC BLOOD PRESSURE: 84 MMHG

## 2025-06-11 PROCEDURE — 36415 COLL VENOUS BLD VENIPUNCTURE: CPT

## 2025-06-11 PROCEDURE — 96415 CHEMO IV INFUSION ADDL HR: CPT

## 2025-06-11 PROCEDURE — 96413 CHEMO IV INFUSION 1 HR: CPT

## 2025-06-11 RX ORDER — CYANOCOBALAMIN 1000 UG/ML
1000 INJECTION, SOLUTION INTRAMUSCULAR; SUBCUTANEOUS
Qty: 0 | Refills: 0 | Status: COMPLETED | OUTPATIENT
Start: 2025-06-05

## 2025-06-11 RX ORDER — INFLIXIMAB 100 MG/10ML
100 INJECTION, POWDER, LYOPHILIZED, FOR SOLUTION INTRAVENOUS
Qty: 1 | Refills: 0 | Status: COMPLETED | OUTPATIENT
Start: 2025-06-05

## 2025-06-13 LAB
ALBUMIN SERPL ELPH-MCNC: 4.1 G/DL
ALP BLD-CCNC: 56 U/L
ALT SERPL-CCNC: 34 U/L
ANION GAP SERPL CALC-SCNC: 12 MMOL/L
AST SERPL-CCNC: 21 U/L
BASOPHILS # BLD AUTO: 0.07 K/UL
BASOPHILS NFR BLD AUTO: 1.1 %
BILIRUB SERPL-MCNC: 0.4 MG/DL
BUN SERPL-MCNC: 15 MG/DL
CALCIUM SERPL-MCNC: 9.7 MG/DL
CHLORIDE SERPL-SCNC: 103 MMOL/L
CO2 SERPL-SCNC: 22 MMOL/L
CREAT SERPL-MCNC: 0.95 MG/DL
CRP SERPL-MCNC: <3 MG/L
EGFRCR SERPLBLD CKD-EPI 2021: 106 ML/MIN/1.73M2
EOSINOPHIL # BLD AUTO: 0.43 K/UL
EOSINOPHIL NFR BLD AUTO: 6.6 %
GLUCOSE SERPL-MCNC: 105 MG/DL
HCT VFR BLD CALC: 43.5 %
HGB BLD-MCNC: 15.5 G/DL
IMM GRANULOCYTES NFR BLD AUTO: 0.5 %
LYMPHOCYTES # BLD AUTO: 2.13 K/UL
LYMPHOCYTES NFR BLD AUTO: 32.8 %
MAN DIFF?: NORMAL
MCHC RBC-ENTMCNC: 32.4 PG
MCHC RBC-ENTMCNC: 35.6 G/DL
MCV RBC AUTO: 90.8 FL
MONOCYTES # BLD AUTO: 0.55 K/UL
MONOCYTES NFR BLD AUTO: 8.5 %
NEUTROPHILS # BLD AUTO: 3.29 K/UL
NEUTROPHILS NFR BLD AUTO: 50.5 %
PLATELET # BLD AUTO: 227 K/UL
POTASSIUM SERPL-SCNC: 4.7 MMOL/L
PROT SERPL-MCNC: 6.8 G/DL
RBC # BLD: 4.79 M/UL
RBC # FLD: 13.6 %
SODIUM SERPL-SCNC: 138 MMOL/L
WBC # FLD AUTO: 6.5 K/UL

## 2025-06-24 ENCOUNTER — APPOINTMENT (OUTPATIENT)
Dept: DERMATOLOGY | Facility: CLINIC | Age: 35
End: 2025-06-24
Payer: COMMERCIAL

## 2025-06-24 PROBLEM — C44.612 PRIMARY BASAL CELL CARCINOMA (BCC) OF RIGHT UPPER EXTREMITY: Status: ACTIVE | Noted: 2025-06-24

## 2025-06-24 PROCEDURE — 17262 DSTRJ MAL LES T/A/L 1.1-2.0: CPT

## 2025-07-23 ENCOUNTER — MED ADMIN CHARGE (OUTPATIENT)
Age: 35
End: 2025-07-23

## 2025-07-23 ENCOUNTER — APPOINTMENT (OUTPATIENT)
Dept: RHEUMATOLOGY | Facility: CLINIC | Age: 35
End: 2025-07-23
Payer: COMMERCIAL

## 2025-07-23 VITALS
TEMPERATURE: 98.4 F | HEART RATE: 68 BPM | OXYGEN SATURATION: 98 % | DIASTOLIC BLOOD PRESSURE: 88 MMHG | SYSTOLIC BLOOD PRESSURE: 134 MMHG | RESPIRATION RATE: 15 BRPM

## 2025-07-23 DIAGNOSIS — K50.90 CROHN'S DISEASE, UNSPECIFIED, W/OUT COMPLICATIONS: ICD-10-CM

## 2025-07-23 DIAGNOSIS — D50.9 IRON DEFICIENCY ANEMIA, UNSPECIFIED: ICD-10-CM

## 2025-07-23 PROCEDURE — 96413 CHEMO IV INFUSION 1 HR: CPT

## 2025-07-23 PROCEDURE — 36415 COLL VENOUS BLD VENIPUNCTURE: CPT

## 2025-07-23 PROCEDURE — 96415 CHEMO IV INFUSION ADDL HR: CPT

## 2025-07-23 RX ORDER — INFLIXIMAB 100 MG/10ML
100 INJECTION, POWDER, LYOPHILIZED, FOR SOLUTION INTRAVENOUS
Qty: 1 | Refills: 0 | Status: COMPLETED | OUTPATIENT
Start: 2025-07-16

## 2025-07-23 RX ORDER — CYANOCOBALAMIN 1000 UG/ML
1000 INJECTION, SOLUTION INTRAMUSCULAR; SUBCUTANEOUS
Qty: 0 | Refills: 0 | Status: COMPLETED | OUTPATIENT
Start: 2025-07-16

## 2025-07-24 LAB
ALBUMIN SERPL ELPH-MCNC: 3.9 G/DL
ALP BLD-CCNC: 55 U/L
ALT SERPL-CCNC: 23 U/L
ANION GAP SERPL CALC-SCNC: 12 MMOL/L
AST SERPL-CCNC: 19 U/L
BASOPHILS # BLD AUTO: 0.05 K/UL
BASOPHILS NFR BLD AUTO: 0.7 %
BILIRUB SERPL-MCNC: 0.2 MG/DL
BUN SERPL-MCNC: 13 MG/DL
CALCIUM SERPL-MCNC: 9.5 MG/DL
CHLORIDE SERPL-SCNC: 109 MMOL/L
CO2 SERPL-SCNC: 22 MMOL/L
CREAT SERPL-MCNC: 0.94 MG/DL
CRP SERPL-MCNC: <3 MG/L
EGFRCR SERPLBLD CKD-EPI 2021: 107 ML/MIN/1.73M2
EOSINOPHIL # BLD AUTO: 0.23 K/UL
EOSINOPHIL NFR BLD AUTO: 3.2 %
GLUCOSE SERPL-MCNC: 88 MG/DL
HCT VFR BLD CALC: 43.7 %
HGB BLD-MCNC: 15 G/DL
IMM GRANULOCYTES NFR BLD AUTO: 0.6 %
LYMPHOCYTES # BLD AUTO: 2.52 K/UL
LYMPHOCYTES NFR BLD AUTO: 34.7 %
MAN DIFF?: NORMAL
MCHC RBC-ENTMCNC: 31.4 PG
MCHC RBC-ENTMCNC: 34.3 G/DL
MCV RBC AUTO: 91.4 FL
MONOCYTES # BLD AUTO: 0.65 K/UL
MONOCYTES NFR BLD AUTO: 9 %
NEUTROPHILS # BLD AUTO: 3.77 K/UL
NEUTROPHILS NFR BLD AUTO: 51.8 %
PLATELET # BLD AUTO: 249 K/UL
POTASSIUM SERPL-SCNC: 5 MMOL/L
PROT SERPL-MCNC: 6.5 G/DL
RBC # BLD: 4.78 M/UL
RBC # FLD: 13.2 %
SODIUM SERPL-SCNC: 143 MMOL/L
WBC # FLD AUTO: 7.26 K/UL

## 2025-09-08 ENCOUNTER — APPOINTMENT (OUTPATIENT)
Dept: RHEUMATOLOGY | Facility: CLINIC | Age: 35
End: 2025-09-08
Payer: COMMERCIAL

## 2025-09-08 ENCOUNTER — MED ADMIN CHARGE (OUTPATIENT)
Age: 35
End: 2025-09-08

## 2025-09-08 VITALS
SYSTOLIC BLOOD PRESSURE: 149 MMHG | TEMPERATURE: 98.4 F | OXYGEN SATURATION: 96 % | DIASTOLIC BLOOD PRESSURE: 88 MMHG | HEART RATE: 102 BPM | RESPIRATION RATE: 15 BRPM

## 2025-09-08 DIAGNOSIS — K50.90 CROHN'S DISEASE, UNSPECIFIED, W/OUT COMPLICATIONS: ICD-10-CM

## 2025-09-08 PROCEDURE — 36415 COLL VENOUS BLD VENIPUNCTURE: CPT

## 2025-09-08 PROCEDURE — 96415 CHEMO IV INFUSION ADDL HR: CPT

## 2025-09-08 PROCEDURE — 96413 CHEMO IV INFUSION 1 HR: CPT

## 2025-09-08 RX ORDER — CYANOCOBALAMIN 1000 UG/ML
1000 INJECTION, SOLUTION INTRAMUSCULAR; SUBCUTANEOUS
Qty: 0 | Refills: 0 | Status: COMPLETED | OUTPATIENT
Start: 2025-09-04

## 2025-09-08 RX ORDER — INFLIXIMAB 100 MG/10ML
100 INJECTION, POWDER, LYOPHILIZED, FOR SOLUTION INTRAVENOUS
Qty: 1 | Refills: 0 | Status: COMPLETED | OUTPATIENT
Start: 2025-09-02

## 2025-09-11 LAB
ALBUMIN SERPL ELPH-MCNC: 3.8 G/DL
ALP BLD-CCNC: 54 U/L
ALT SERPL-CCNC: 18 U/L
ANION GAP SERPL CALC-SCNC: 10 MMOL/L
AST SERPL-CCNC: 19 U/L
BASOPHILS # BLD AUTO: 0.04 K/UL
BASOPHILS NFR BLD AUTO: 0.6 %
BILIRUB SERPL-MCNC: 0.3 MG/DL
BUN SERPL-MCNC: 12 MG/DL
CALCIUM SERPL-MCNC: 9.1 MG/DL
CHLORIDE SERPL-SCNC: 104 MMOL/L
CO2 SERPL-SCNC: 22 MMOL/L
CREAT SERPL-MCNC: 1 MG/DL
CRP SERPL-MCNC: <3 MG/L
EGFRCR SERPLBLD CKD-EPI 2021: 99 ML/MIN/1.73M2
EOSINOPHIL # BLD AUTO: 0.14 K/UL
EOSINOPHIL NFR BLD AUTO: 2 %
GLUCOSE SERPL-MCNC: 137 MG/DL
HCT VFR BLD CALC: 43.4 %
HGB BLD-MCNC: 15.3 G/DL
IMM GRANULOCYTES NFR BLD AUTO: 0.6 %
LYMPHOCYTES # BLD AUTO: 2.22 K/UL
LYMPHOCYTES NFR BLD AUTO: 31.2 %
MAN DIFF?: NORMAL
MCHC RBC-ENTMCNC: 32.3 PG
MCHC RBC-ENTMCNC: 35.3 G/DL
MCV RBC AUTO: 91.8 FL
MONOCYTES # BLD AUTO: 0.66 K/UL
MONOCYTES NFR BLD AUTO: 9.3 %
NEUTROPHILS # BLD AUTO: 4.02 K/UL
NEUTROPHILS NFR BLD AUTO: 56.3 %
PLATELET # BLD AUTO: 216 K/UL
POTASSIUM SERPL-SCNC: 5 MMOL/L
PROT SERPL-MCNC: 6.7 G/DL
RBC # BLD: 4.73 M/UL
RBC # FLD: 13.2 %
SODIUM SERPL-SCNC: 136 MMOL/L
WBC # FLD AUTO: 7.12 K/UL

## 2025-09-17 ENCOUNTER — APPOINTMENT (OUTPATIENT)
Dept: DERMATOLOGY | Facility: CLINIC | Age: 35
End: 2025-09-17
Payer: COMMERCIAL

## 2025-09-17 DIAGNOSIS — L82.1 OTHER SEBORRHEIC KERATOSIS: ICD-10-CM

## 2025-09-17 DIAGNOSIS — D48.5 NEOPLASM OF UNCERTAIN BEHAVIOR OF SKIN: ICD-10-CM

## 2025-09-17 DIAGNOSIS — L57.8 OTHER SKIN CHANGES DUE TO CHRONIC EXPOSURE TO NONIONIZING RADIATION: ICD-10-CM

## 2025-09-17 DIAGNOSIS — C44.612 BASAL CELL CARCINOMA OF SKIN OF RIGHT UPPER LIMB, INCLUDING SHOULDER: ICD-10-CM

## 2025-09-17 PROCEDURE — 11102 TANGNTL BX SKIN SINGLE LES: CPT

## 2025-09-17 PROCEDURE — 99213 OFFICE O/P EST LOW 20 MIN: CPT | Mod: 25

## 2025-09-25 LAB — DERMATOLOGY BIOPSY: NORMAL

## (undated) DEVICE — FORCEP RADIAL JAW 4 W NDL 2.2MM 2.8MM 240CM ORANGE DISP